# Patient Record
Sex: MALE | Race: WHITE | NOT HISPANIC OR LATINO | Employment: FULL TIME | URBAN - METROPOLITAN AREA
[De-identification: names, ages, dates, MRNs, and addresses within clinical notes are randomized per-mention and may not be internally consistent; named-entity substitution may affect disease eponyms.]

---

## 2017-01-20 ENCOUNTER — OFFICE VISIT (OUTPATIENT)
Dept: OPHTHALMOLOGY | Facility: CLINIC | Age: 47
End: 2017-01-20
Attending: OPHTHALMOLOGY
Payer: COMMERCIAL

## 2017-01-20 DIAGNOSIS — M31.30 WEGENER'S GRANULOMATOSIS (GRANULOMATOSIS WITH POLYANGIITIS): Primary | ICD-10-CM

## 2017-01-20 PROCEDURE — 99212 OFFICE O/P EST SF 10 MIN: CPT | Mod: ZF

## 2017-01-20 ASSESSMENT — TONOMETRY
OD_IOP_MMHG: 9
IOP_METHOD: ICARE
OS_IOP_MMHG: 13

## 2017-01-20 ASSESSMENT — VISUAL ACUITY
METHOD: SNELLEN - LINEAR
METHOD_MR: DECLINES
OS_CC: 20/15
OD_SC: 20/60
OS_CC+: +1
OD_PH_SC: 20/40+1

## 2017-01-20 ASSESSMENT — EXTERNAL EXAM - LEFT EYE: OS_EXAM: NORMAL

## 2017-01-20 ASSESSMENT — SLIT LAMP EXAM - LIDS
COMMENTS: NORMAL
COMMENTS: NORMAL

## 2017-01-20 ASSESSMENT — EXTERNAL EXAM - RIGHT EYE: OD_EXAM: NORMAL

## 2017-01-20 NOTE — PROGRESS NOTES
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 Pt feels that vision is stable, denies any visual changes or complaints today. No problems using all 3 drops and last drop used last night. Still see some floaters, comes and goes often. Denies any pain or discomfort    POHx:  Granulomatosis polyangiitis/Marylin's  Corneal/Scleral Melts requiring patch grafts     Current Eye Medications:   Tacrolimus/Protopic 2-3 times daily OD  Prednisolone BID  PFATs PRN OD    Assessment & Plan:    Braxton Ayala is a 46 year old male with the following diagnoses:     1. H/o penetrating keratoplasty (PK), Right Eye: from Granulomatosis w/ Polyangiitis                - most recent flare up was October 2016 (prior to that was January 2014)              - episcleral/scleral inflammation resolved, stromal haze continues, area of epithelialized 70% corneal thinning present prior (small round inferonasal) stable (photos taken 11/16)              - tacrolimus/Protopic BID              - prednisolone acetate BID              - continue PFAT as needed both eyes for comfort    2. Granulomatosis w/ polyangiitis: follows Rheumatology at the [place]   - off systemic immunosuppression     3. Pseudophakia, Right Eye    Continue present management   Return 6 months or PRN change    Carin Concepcion MD  Cornea Fellow      ~~~~~~~~~~~~~~~~~~~~~~~~~~~~~~~~~~~~~~~~~~~~~~~~~~~~~~~~~~~~~~~~    I have personally examined the patient and agree with the assessment and plan as delineated by the resident / fellow.  I have confirmed the contents of the chief complaint, history of present illness, review of systems, and medical / surgical history sections and edited the note as needed.    Yovani Law MD, MA  Director, Cornea & Anterior Segment  [facility] Department of  Ophthalmology & Visual Neuroscience

## 2017-01-20 NOTE — NURSING NOTE
Chief Complaints and History of Present Illnesses   Patient presents with     Follow Up For     PK RE     HPI    Last Eye Exam:  11/10/16   Affected eye(s):  Right   Symptoms:     Floaters      Duration:  2 months   Frequency:  Constant       Do you have eye pain now?:  No      Comments:  Pt returns for 2 month follow up. Pt feels that vision is stable, denies any visual changes or complaints today. No problems using all 3 drops and last drop used last night. Still see some floaters, comes and goes often. Denies any pain or discomfort. TL GONZALEZ, COA 7:45 AM 01/20/2017

## 2017-03-14 NOTE — PROGRESS NOTES
"Rheumatology Visit     Braxton Ayala MRN# [id number]   YOB: 1970 Age: 46 year old     Date of Visit: March 15, 2017  Primary care provider: Lucina, Darshan Grigsby          Assessment and Plan:   # Limited Wegener's Granulomatosis (R corneal \"melt\" 2007, s/p 9 months of Cytoxan infusion completed 2008, s/p 18 mos maintenance Rx with imuran, off imuran since 9-10. 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 He is asymptomatic. Exam is unchanged. ANCA was low in 3-15 (negative since at least 2010), and Cr, UA, LFT and CRP were normal in 9-16.  I think that AAV remains in remission, but I will screen for activity with Cr, UA, CBC, LFT. If systemic disease remains quiet, if local flares can be controlled with short bursts of prednisone and topical Rx, and if no further transplantation is contemplated, he may continue off systemic immunosuppression. Continue with Protopic (tacrolimus) treatment daily and steroid (dexamethasone) as needed, per Dr. Law in Ophthalmology. Rituximab would be a good choice if graft-threatening eye disease recurs.  # Essential hypertension: Although pt reports normal BP readings at other places, he admits inconsistent adherence to medication, and he has repeat rachelle hypertension  today. Recommend reinstitute lisinopril/HCTZ (10 mg/12.5 mg) and use regularly. Additionally, Mr. Ayala should monitor BP at home 2X weekly, submit a record of several weeks' readings to his primary provider's office (he plans on establishing with Darshan), and continued physical activity. I urged him once again to establish a primary care provider for blood pressure monitoring/management.  # Seasonal allergies: most prevalent in fall and spring; not problematic this season.  1. Use Claritin or other OTC allergy medication as needed (mg not reported/known). Right nasal " "discharge reported today does not seem to be connected eye symptoms and thus will manage as separate conditions.  # Shoulder pain: infrequent but evident in prior medical history. Has not increased in frequency, severity, or intensity over last few years.  Plan Ibuprofen (600 mg) up to tid prn  Return to clinic in 6 months  Quique Gould M.D.  Staff Rheumatologist, [facility]  Pager [phone]        Active Problem List:   Patient Active Problem List    Diagnosis Date Noted     Corneal defect 10/15/2013     Priority: Medium     Hypertension 12/09/2011     Priority: Medium     Treated with ACE-I/HCTZ 2011.       ANCA-associated vasculitis (H) 12/05/2011     Priority: Medium     Limited WG presenting with corneal melt in 2008, s/p 9 months of cytoxan infusion finished in july 2008, started on Imuran 150mg/d (8/08) and prednsione was tapered off by the end of sept 2008, s/p corneal transplant graft R eye march 2009, developed Cataracts,s/p R eye catracts surgery 3-10. Imuran tapered off 9-10. \"Rejection\" of graft 9-10 treated with oral prednisone and steroid drops. 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 New graft placed 4-11.              History of Present Illness:   Pt presents for f/u corneal melt due to Wegener's Granulomatosis. Last seen 9-16. Anti-hypertensive Rx and \"watchful waiting\" was continued at that time.  Interval history:  He took increased dose of eye drop for \"minor flare\" of eye inflammation 5 months ago-- no problems since then.  He has been managing quite well with a current treatment regimen of eye topicals: Protopic (tacrolimus) daily, and steroid (dexamethasone) as needed. The steroid is used \"about once weekly\" to manage any irritation/soreness/dryness in the eyes. He met with his Ophthalmologist, Dr. Law, in 1-17; no activity was noted. January 2014 was his last episode of corneal \"melt.\" He thus continues the longest interval between episodes of corneal melt to " date.  He reports resolved modest OWUSU and dizziness with vigorous activity, noted at last vist. No cough, nasal d/c, chest pain, nausea. He suspects deconditioning was the cause.  For his essential hypertension, Mr. Ayala reports recent variable adherence to Lisinopril-HCTZ. Additionally does not check his blood pressure frequently.He does not have a primary care provider but again states plans to establish one through his wife's insurance.           Review of Systems:   Review Of Systems  Constitutional: Weight stable  Skin: negative  Eyes: HPI  Ears/Nose/Throat: negative  Respiratory: HPI  Cardiovascular: negative  Gastrointestinal: negative  Genitourinary: none  Musculoskeletal: R shoulder pain. Pain typically resolves with ibuprofen (600 mg); stable  Neurologic: negative  Psychiatric: negative  Hematologic/Lymphatic/Immunologic: negative  Endocrine: negative          Past Medical History:   Past Medical History   Diagnosis Date     ANCA-associated vasculitis (H) 12/5/2011     Hypertension      Nonsenile cataract      Marylin's disease (congenital syphilitic osteochondritis)      Past Surgical History   Procedure Laterality Date     Adjust suture eye post procedure  6/15/2012     Procedure: ADJUST SUTURE EYE POST PROCEDURE;  RIGHT EYE REMOVAL AND REPLACEMENT OF SUTURE ;  Surgeon: Andriy Harmon MD;  Location:  [facility]     Cataract iol, right eye  03/10/2011     C corneal transplant,penetrating  04/06/2011     Cataract iol, rt/lt       C anesth,corneal transplant              Social History:   Social History     Occupational History     Not on file.     Social History Main Topics     Smoking status: Never Smoker     Smokeless tobacco: Never Used     Alcohol use Yes      Comment: occasional     Drug use: No     Sexual activity: Not on file     Non smoker      Infrequent EtOH ~ 1x/week.  Coaches basketball         Family History:     Family History   Problem Relation Age of Onset     Glaucoma No family  "hx of      Macular Degeneration No family hx of      Retinal detachment No family hx of      Amblyopia No family hx of      Non contributory       Allergies:     Allergies   Allergen Reactions     Nkda [No Known Drug Allergies]             Medications:   Current Outpatient Prescriptions   Medication Sig Dispense Refill     lisinopril-hydrochlorothiazide (PRINZIDE/ZESTORETIC) 10-12.5 MG per tablet Take 1 tablet by mouth daily 90 tablet 3     prednisoLONE acetate (PRED FORTE) 1 % ophthalmic suspension Place 1 drop into the right eye daily as needed 15 mL 11     PROTOPIC 0.1 % ointment Apply topically daily in the right eye 100 g 11     [DISCONTINUED] lisinopril-hydrochlorothiazide (PRINZIDE,ZESTORETIC) 10-12.5 MG per tablet Take 1 tablet by mouth daily 90 tablet 3              Physical Exam:   Blood pressure (!) 165/108, pulse 63, temperature 98.3  F (36.8  C), temperature source Oral, height 1.816 m (5' 11.5\"), weight 82.1 kg (181 lb), SpO2 98 %.  Wt Readings from Last 4 Encounters:   03/15/17 82.1 kg (181 lb)   09/14/16 83.9 kg (185 lb)   03/16/16 80.5 kg (177 lb 6.4 oz)   09/18/15 81.6 kg (180 lb)       HEENT: No erythema of R lower eyelid or nasal sclera. He is anicteric, no facial rash, sufficient saliva pool, no nasopharyngeal ulcers. No mucosal ulcers or evidence of bleed. R pupil small, irregular, less responsive to light. No scleral injection.  Cor: RRR S1 S2  Lungs: CTA  EXTREMITIES: No edema, full pulses, no cyanosis, no nail fold capillary changes   MSK: No synovitis, from all joints   SKIN: No rashes            Data:     Results for orders placed or performed in visit on 09/14/16   CBC with platelets   Result Value Ref Range    WBC 7.6 4.0 - 11.0 10e9/L    RBC Count 4.79 4.4 - 5.9 10e12/L    Hemoglobin 15.0 13.3 - 17.7 g/dL    Hematocrit 42.3 40.0 - 53.0 %    MCV 88 78 - 100 fl    MCH 31.3 26.5 - 33.0 pg    MCHC 35.5 31.5 - 36.5 g/dL    RDW 12.0 10.0 - 15.0 %    Platelet Count 334 150 - 450 10e9/L "   Creatinine   Result Value Ref Range    Creatinine 1.16 0.66 - 1.25 mg/dL    GFR Estimate 68 >60 mL/min/1.7m2    GFR Estimate If Black 83 >60 mL/min/1.7m2   CRP inflammation   Result Value Ref Range    CRP Inflammation <2.9 0.0 - 8.0 mg/L   Routine UA with microscopic   Result Value Ref Range    Color Urine Yellow     Appearance Urine Clear     Glucose Urine Negative NEG mg/dL    Bilirubin Urine Negative NEG    Ketones Urine Negative NEG mg/dL    Specific Gravity Urine 1.018 1.003 - 1.035    Blood Urine Negative NEG    pH Urine 6.0 5.0 - 7.0 pH    Protein Albumin Urine Negative NEG mg/dL    Urobilinogen mg/dL 0.0 0.0 - 2.0 mg/dL    Nitrite Urine Negative NEG    Leukocyte Esterase Urine Negative NEG    Source Midstream Urine     WBC Urine 1 0 - 2 /HPF    RBC Urine 1 0 - 2 /HPF    Squamous Epithelial /HPF Urine <1 0 - 1 /HPF    Mucous Urine Present (A) NEG /LPF   ALT   Result Value Ref Range    ALT 25 0 - 70 U/L   AST   Result Value Ref Range    AST 15 0 - 45 U/L       Recent Labs   Lab Test  09/14/16   0806  03/05/14   0816  09/04/13   0757  01/08/13   0718   04/21/11   0921   06/10/09   0916  05/07/09   0903  04/23/09   0725   WBC  7.6  9.3  9.2  10.6   < >  8.0   < >  7.3  6.8  7.9   HGB  15.0  16.9  16.0  15.7   < >  14.7   < >  14.6  14.5  15.9   HCT  42.3  47.5  45.4  44.9   < >  40.9   < >  42.7  41.9  44.5   MCV  88  91  91  91   < >  89   < >  94  92  91   PLT  334  395  371  330   < >  287   < >  345  322  338   BUN   --    --    --    --    --   12   --   14  10  19   AST  15  31   --   27   < >  19   < >   --   29  25   ALT  25  24   --   25   < >  17   < >   --   8  8   ALKPHOS   --    --    --    --    --   60   --    --   75  68    < > = values in this interval not displayed.

## 2017-03-15 ENCOUNTER — OFFICE VISIT (OUTPATIENT)
Dept: RHEUMATOLOGY | Facility: CLINIC | Age: 47
End: 2017-03-15
Attending: INTERNAL MEDICINE
Payer: COMMERCIAL

## 2017-03-15 VITALS
OXYGEN SATURATION: 98 % | DIASTOLIC BLOOD PRESSURE: 108 MMHG | BODY MASS INDEX: 24.52 KG/M2 | HEART RATE: 63 BPM | SYSTOLIC BLOOD PRESSURE: 165 MMHG | WEIGHT: 181 LBS | HEIGHT: 72 IN | TEMPERATURE: 98.3 F

## 2017-03-15 DIAGNOSIS — I10 ESSENTIAL HYPERTENSION: ICD-10-CM

## 2017-03-15 LAB
ALBUMIN UR-MCNC: NEGATIVE MG/DL
APPEARANCE UR: CLEAR
BILIRUB UR QL STRIP: NEGATIVE
COLOR UR AUTO: YELLOW
CREAT SERPL-MCNC: 1.15 MG/DL (ref 0.66–1.25)
ERYTHROCYTE [DISTWIDTH] IN BLOOD BY AUTOMATED COUNT: 12.3 % (ref 10–15)
GFR SERPL CREATININE-BSD FRML MDRD: 69 ML/MIN/1.7M2
GLUCOSE UR STRIP-MCNC: NEGATIVE MG/DL
HCT VFR BLD AUTO: 44.1 % (ref 40–53)
HGB BLD-MCNC: 15.3 G/DL (ref 13.3–17.7)
HGB UR QL STRIP: NEGATIVE
KETONES UR STRIP-MCNC: NEGATIVE MG/DL
LEUKOCYTE ESTERASE UR QL STRIP: NEGATIVE
MCH RBC QN AUTO: 32 PG (ref 26.5–33)
MCHC RBC AUTO-ENTMCNC: 34.7 G/DL (ref 31.5–36.5)
MCV RBC AUTO: 92 FL (ref 78–100)
MUCOUS THREADS #/AREA URNS LPF: PRESENT /LPF
NITRATE UR QL: NEGATIVE
PH UR STRIP: 7 PH (ref 5–7)
PLATELET # BLD AUTO: 371 10E9/L (ref 150–450)
RBC # BLD AUTO: 4.78 10E12/L (ref 4.4–5.9)
RBC #/AREA URNS AUTO: 1 /HPF (ref 0–2)
SP GR UR STRIP: 1.02 (ref 1–1.03)
URN SPEC COLLECT METH UR: ABNORMAL
UROBILINOGEN UR STRIP-MCNC: 0 MG/DL (ref 0–2)
WBC # BLD AUTO: 9 10E9/L (ref 4–11)
WBC #/AREA URNS AUTO: <1 /HPF (ref 0–2)

## 2017-03-15 PROCEDURE — 81001 URINALYSIS AUTO W/SCOPE: CPT | Performed by: INTERNAL MEDICINE

## 2017-03-15 PROCEDURE — 82565 ASSAY OF CREATININE: CPT | Performed by: INTERNAL MEDICINE

## 2017-03-15 PROCEDURE — 99212 OFFICE O/P EST SF 10 MIN: CPT | Mod: ZF

## 2017-03-15 PROCEDURE — 85027 COMPLETE CBC AUTOMATED: CPT | Performed by: INTERNAL MEDICINE

## 2017-03-15 PROCEDURE — 36415 COLL VENOUS BLD VENIPUNCTURE: CPT | Performed by: INTERNAL MEDICINE

## 2017-03-15 RX ORDER — LISINOPRIL/HYDROCHLOROTHIAZIDE 10-12.5 MG
1 TABLET ORAL DAILY
Qty: 90 TABLET | Refills: 3 | Status: SHIPPED | OUTPATIENT
Start: 2017-03-15 | End: 2017-09-15

## 2017-03-15 ASSESSMENT — PAIN SCALES - GENERAL: PAINLEVEL: NO PAIN (0)

## 2017-03-15 NOTE — NURSING NOTE
Chief Complaint   Patient presents with     RECHECK     Wegener's granulomatosis    Pt roomed, vitals, meds, and allergies reviewed with pt. Pt ready for provider.  Billy Elias, CMA

## 2017-03-15 NOTE — MR AVS SNAPSHOT
After Visit Summary   3/15/2017    Braxton Ayala    MRN: [id number]           Patient Information     Date Of Birth          1970        Visit Information        Provider Department      3/15/2017 7:30 AM Quique Gould MD [facility] Rheumatology        Today's Diagnoses     Essential hypertension          Care Instructions    Set up primary provider ASAP--address longstanding hypertension issue for best results.        Follow-ups after your visit        Follow-up notes from your care team     Return in about 6 months (around 9/15/2017).      Your next 10 appointments already scheduled     Mar 15, 2017  8:15 AM CDT   Lab with  LAB   [facility] ([facility] [place])    [address]  1st [address]  [place] [facility] [address] [phone]            Jul 20, 2017  7:30 AM CDT   RETURN CORNEA with Yovani Law MD   Eye Clinic ([facility])    [address]  [address]  [address]  [place] [facility] [place] [address]   [phone]            Sep 15, 2017  7:00 AM CDT   (Arrive by 6:45 AM)   Return Visit with Quique Gould MD   [facility] Rheumatology ([facility] [place])    [address]  3rd Floor  [place] [facility] [id number] [phone]              Future tests that were ordered for you today     Open Standing Orders        Priority Remaining Interval Expires Ordered    CBC with platelets Routine 7/7 Every 2 Months 3/15/2018 3/15/2017          Open Future Orders        Priority Expected Expires Ordered    Routine UA with microscopic Routine  4/14/2017 3/15/2017    Creatinine Routine  4/14/2017 3/15/2017            Who to contact     If you have questions or need follow up information about today's clinic visit or your schedule please contact [facility] RHEUMATOLOGY directly at [phone].  Normal or non-critical lab and imaging results will be communicated to you by MyChart, letter or phone within 4 business days after the clinic  "has received the results. If you do not hear from us within 7 days, please contact the clinic through [employer] or phone. If you have a critical or abnormal lab result, we will notify you by phone as soon as possible.  Submit refill requests through [employer] or call your pharmacy and they will forward the refill request to us. Please allow 3 business days for your refill to be completed.          Additional Information About Your Visit        GetSocial Information     [employer] gives you secure access to your electronic health record. If you see a primary care provider, you can also send messages to your care team and make appointments. If you have questions, please call your primary care clinic.  If you do not have a primary care provider, please call [phone] and they will assist you.        Care EveryWhere ID     This is your Care EveryWhere ID. This could be used by other organizations to access your [facility] medical records  [id number]        Your Vitals Were     Pulse Temperature Height Pulse Oximetry BMI (Body Mass Index)       63 98.3  F (36.8  C) (Oral) 1.816 m (5' 11.5\") 98% 24.89 kg/m2        Blood Pressure from Last 3 Encounters:   03/15/17 (!) 165/108   09/14/16 (!) 161/102   03/16/16 132/87    Weight from Last 3 Encounters:   03/15/17 82.1 kg (181 lb)   09/14/16 83.9 kg (185 lb)   03/16/16 80.5 kg (177 lb 6.4 oz)                 Where to get your medicines      These medications were sent to [facility] [address] [place] [address] [address] [place] [id number] [facility] [address]     Phone:  [phone]     lisinopril-hydrochlorothiazide 10-12.5 MG per tablet          Primary Care Provider Office Phone # Fax #    Darshan [facility] [phone] [address] [address]        Thank you!     Thank you for choosing [facility]  for your care. Our goal is always to provide you with excellent care. Hearing back from " our patients is one way we can continue to improve our services. Please take a few minutes to complete the written survey that you may receive in the mail after your visit with us. Thank you!             Your Updated Medication List - Protect others around you: Learn how to safely use, store and throw away your medicines at www.disposemymeds.org.          This list is accurate as of: 3/15/17  7:57 AM.  Always use your most recent med list.                   Brand Name Dispense Instructions for use    lisinopril-hydrochlorothiazide 10-12.5 MG per tablet    PRINZIDE/ZESTORETIC    90 tablet    Take 1 tablet by mouth daily       prednisoLONE acetate 1 % ophthalmic susp    PRED FORTE    15 mL    Place 1 drop into the right eye daily as needed       PROTOPIC 0.1 % ointment   Generic drug:  tacrolimus     100 g    Apply topically daily in the right eye

## 2017-03-15 NOTE — LETTER
"3/15/2017      RE: Braxton Ayala  [address] [facility] [address]       Rheumatology Visit     Braxton Ayala MRN# [id number]   YOB: 1970 Age: 46 year old     Date of Visit: March 15, 2017  Primary care provider: Lucina, Darshan Grigsby          Assessment and Plan:   # Limited Wegener's Granulomatosis (R corneal \"melt\" 2007, s/p 9 months of Cytoxan infusion completed 2008, s/p 18 mos maintenance Rx with imuran, off imuran since 9-10. 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 He is asymptomatic. Exam is unchanged. ANCA was low in 3-15 (negative since at least 2010), and Cr, UA, LFT and CRP were normal in 9-16.  I think that AAV remains in remission, but I will screen for activity with Cr, UA, CBC, LFT. If systemic disease remains quiet, if local flares can be controlled with short bursts of prednisone and topical Rx, and if no further transplantation is contemplated, he may continue off systemic immunosuppression. Continue with Protopic (tacrolimus) treatment daily and steroid (dexamethasone) as needed, per Dr. Law in Ophthalmology. Rituximab would be a good choice if graft-threatening eye disease recurs.  # Essential hypertension: Although pt reports normal BP readings at other places, he admits inconsistent adherence to medication, and he has repeat rachelle hypertension  today. Recommend reinstitute lisinopril/HCTZ (10 mg/12.5 mg) and use regularly. Additionally, Mr. Ayala should monitor BP at home 2X weekly, submit a record of several weeks' readings to his primary provider's office (he plans on establishing with Darshan), and continued physical activity. I urged him once again to establish a primary care provider for blood pressure monitoring/management.  # Seasonal allergies: most prevalent in fall and spring; not problematic this season.  1. Use Claritin or other " "OTC allergy medication as needed (mg not reported/known). Right nasal discharge reported today does not seem to be connected eye symptoms and thus will manage as separate conditions.  # Shoulder pain: infrequent but evident in prior medical history. Has not increased in frequency, severity, or intensity over last few years.  Plan Ibuprofen (600 mg) up to tid prn  Return to clinic in 6 months  Quique Gould M.D.  Staff Rheumatologist, [facility]  Pager [phone]        Active Problem List:   Patient Active Problem List    Diagnosis Date Noted     Corneal defect 10/15/2013     Priority: Medium     Hypertension 12/09/2011     Priority: Medium     Treated with ACE-I/HCTZ 2011.       ANCA-associated vasculitis (H) 12/05/2011     Priority: Medium     Limited WG presenting with corneal melt in 2008, s/p 9 months of cytoxan infusion finished in july 2008, started on Imuran 150mg/d (8/08) and prednsione was tapered off by the end of sept 2008, s/p corneal transplant graft R eye march 2009, developed Cataracts,s/p R eye catracts surgery 3-10. Imuran tapered off 9-10. \"Rejection\" of graft 9-10 treated with oral prednisone and steroid drops. 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 New graft placed 4-11.              History of Present Illness:   Pt presents for f/u corneal melt due to Wegener's Granulomatosis. Last seen 9-16. Anti-hypertensive Rx and \"watchful waiting\" was continued at that time.  Interval history:  He took increased dose of eye drop for \"minor flare\" of eye inflammation 5 months ago-- no problems since then.  He has been managing quite well with a current treatment regimen of eye topicals: Protopic (tacrolimus) daily, and steroid (dexamethasone) as needed. The steroid is used \"about once weekly\" to manage any irritation/soreness/dryness in the eyes. He met with his Ophthalmologist, Dr. Law, in 1-17; no activity was noted. January 2014 was his last episode of corneal \"melt.\" He thus " continues the longest interval between episodes of corneal melt to date.  He reports resolved modest OWUSU and dizziness with vigorous activity, noted at last vist. No cough, nasal d/c, chest pain, nausea. He suspects deconditioning was the cause.  For his essential hypertension, Mr. Ayala reports recent variable adherence to Lisinopril-HCTZ. Additionally does not check his blood pressure frequently.He does not have a primary care provider but again states plans to establish one through his wife's insurance.           Review of Systems:   Review Of Systems  Constitutional: Weight stable  Skin: negative  Eyes: HPI  Ears/Nose/Throat: negative  Respiratory: HPI  Cardiovascular: negative  Gastrointestinal: negative  Genitourinary: none  Musculoskeletal: R shoulder pain. Pain typically resolves with ibuprofen (600 mg); stable  Neurologic: negative  Psychiatric: negative  Hematologic/Lymphatic/Immunologic: negative  Endocrine: negative          Past Medical History:   Past Medical History   Diagnosis Date     ANCA-associated vasculitis (H) 12/5/2011     Hypertension      Nonsenile cataract      Marylin's disease (congenital syphilitic osteochondritis)      Past Surgical History   Procedure Laterality Date     Adjust suture eye post procedure  6/15/2012     Procedure: ADJUST SUTURE EYE POST PROCEDURE;  RIGHT EYE REMOVAL AND REPLACEMENT OF SUTURE ;  Surgeon: Andriy Harmon MD;  Location:  [facility]     Cataract iol, right eye  03/10/2011     C corneal transplant,penetrating  04/06/2011     Cataract iol, rt/lt       C anesth,corneal transplant              Social History:   Social History     Occupational History     Not on file.     Social History Main Topics     Smoking status: Never Smoker     Smokeless tobacco: Never Used     Alcohol use Yes      Comment: occasional     Drug use: No     Sexual activity: Not on file     Non smoker      Infrequent EtOH ~ 1x/week.  Coaches basketball         Family History:  "    Family History   Problem Relation Age of Onset     Glaucoma No family hx of      Macular Degeneration No family hx of      Retinal detachment No family hx of      Amblyopia No family hx of      Non contributory       Allergies:     Allergies   Allergen Reactions     Nkda [No Known Drug Allergies]             Medications:   Current Outpatient Prescriptions   Medication Sig Dispense Refill     lisinopril-hydrochlorothiazide (PRINZIDE/ZESTORETIC) 10-12.5 MG per tablet Take 1 tablet by mouth daily 90 tablet 3     prednisoLONE acetate (PRED FORTE) 1 % ophthalmic suspension Place 1 drop into the right eye daily as needed 15 mL 11     PROTOPIC 0.1 % ointment Apply topically daily in the right eye 100 g 11     [DISCONTINUED] lisinopril-hydrochlorothiazide (PRINZIDE,ZESTORETIC) 10-12.5 MG per tablet Take 1 tablet by mouth daily 90 tablet 3              Physical Exam:   Blood pressure (!) 165/108, pulse 63, temperature 98.3  F (36.8  C), temperature source Oral, height 1.816 m (5' 11.5\"), weight 82.1 kg (181 lb), SpO2 98 %.  Wt Readings from Last 4 Encounters:   03/15/17 82.1 kg (181 lb)   09/14/16 83.9 kg (185 lb)   03/16/16 80.5 kg (177 lb 6.4 oz)   09/18/15 81.6 kg (180 lb)       HEENT: No erythema of R lower eyelid or nasal sclera. He is anicteric, no facial rash, sufficient saliva pool, no nasopharyngeal ulcers. No mucosal ulcers or evidence of bleed. R pupil small, irregular, less responsive to light. No scleral injection.  Cor: RRR S1 S2  Lungs: CTA  EXTREMITIES: No edema, full pulses, no cyanosis, no nail fold capillary changes   MSK: No synovitis, from all joints   SKIN: No rashes            Data:     Results for orders placed or performed in visit on 09/14/16   CBC with platelets   Result Value Ref Range    WBC 7.6 4.0 - 11.0 10e9/L    RBC Count 4.79 4.4 - 5.9 10e12/L    Hemoglobin 15.0 13.3 - 17.7 g/dL    Hematocrit 42.3 40.0 - 53.0 %    MCV 88 78 - 100 fl    MCH 31.3 26.5 - 33.0 pg    MCHC 35.5 31.5 - 36.5 " g/dL    RDW 12.0 10.0 - 15.0 %    Platelet Count 334 150 - 450 10e9/L   Creatinine   Result Value Ref Range    Creatinine 1.16 0.66 - 1.25 mg/dL    GFR Estimate 68 >60 mL/min/1.7m2    GFR Estimate If Black 83 >60 mL/min/1.7m2   CRP inflammation   Result Value Ref Range    CRP Inflammation <2.9 0.0 - 8.0 mg/L   Routine UA with microscopic   Result Value Ref Range    Color Urine Yellow     Appearance Urine Clear     Glucose Urine Negative NEG mg/dL    Bilirubin Urine Negative NEG    Ketones Urine Negative NEG mg/dL    Specific Gravity Urine 1.018 1.003 - 1.035    Blood Urine Negative NEG    pH Urine 6.0 5.0 - 7.0 pH    Protein Albumin Urine Negative NEG mg/dL    Urobilinogen mg/dL 0.0 0.0 - 2.0 mg/dL    Nitrite Urine Negative NEG    Leukocyte Esterase Urine Negative NEG    Source Midstream Urine     WBC Urine 1 0 - 2 /HPF    RBC Urine 1 0 - 2 /HPF    Squamous Epithelial /HPF Urine <1 0 - 1 /HPF    Mucous Urine Present (A) NEG /LPF   ALT   Result Value Ref Range    ALT 25 0 - 70 U/L   AST   Result Value Ref Range    AST 15 0 - 45 U/L       Recent Labs   Lab Test  09/14/16   0806  03/05/14   0816  09/04/13   0757  01/08/13   0718   04/21/11   0921   06/10/09   0916  05/07/09   0903  04/23/09   0725   WBC  7.6  9.3  9.2  10.6   < >  8.0   < >  7.3  6.8  7.9   HGB  15.0  16.9  16.0  15.7   < >  14.7   < >  14.6  14.5  15.9   HCT  42.3  47.5  45.4  44.9   < >  40.9   < >  42.7  41.9  44.5   MCV  88  91  91  91   < >  89   < >  94  92  91   PLT  334  395  371  330   < >  287   < >  345  322  338   BUN   --    --    --    --    --   12   --   14  10  19   AST  15  31   --   27   < >  19   < >   --   29  25   ALT  25  24   --   25   < >  17   < >   --   8  8   ALKPHOS   --    --    --    --    --   60   --    --   75  68    < > = values in this interval not displayed.         Quique Gould MD

## 2017-07-20 ENCOUNTER — OFFICE VISIT (OUTPATIENT)
Dept: OPHTHALMOLOGY | Facility: CLINIC | Age: 47
End: 2017-07-20
Attending: OPHTHALMOLOGY
Payer: COMMERCIAL

## 2017-07-20 DIAGNOSIS — Z94.7 HISTORY OF PENETRATING KERATOPLASTY: ICD-10-CM

## 2017-07-20 DIAGNOSIS — I77.82 ANCA-ASSOCIATED VASCULITIS (H): ICD-10-CM

## 2017-07-20 DIAGNOSIS — M31.30 WEGENER'S GRANULOMATOSIS (GRANULOMATOSIS WITH POLYANGIITIS): Primary | ICD-10-CM

## 2017-07-20 DIAGNOSIS — Z96.1 PSEUDOPHAKIA OF RIGHT EYE: ICD-10-CM

## 2017-07-20 PROCEDURE — 99213 OFFICE O/P EST LOW 20 MIN: CPT | Mod: ZF

## 2017-07-20 RX ORDER — OFLOXACIN 3 MG/ML
1 SOLUTION/ DROPS OPHTHALMIC 4 TIMES DAILY
Qty: 1 BOTTLE | Refills: 11 | Status: SHIPPED | OUTPATIENT
Start: 2017-07-20 | End: 2017-09-15

## 2017-07-20 ASSESSMENT — REFRACTION_WEARINGRX
OD_SPHERE: BALANCE
OS_SPHERE: -3.00
OS_AXIS: 087
SPECS_TYPE: SVL
OS_CYLINDER: +1.00

## 2017-07-20 ASSESSMENT — VISUAL ACUITY
OS_CC: 20/15
CORRECTION_TYPE: GLASSES
METHOD: SNELLEN - LINEAR
OD_SC: 20/60-1
OD_PH_SC: 20/50+1

## 2017-07-20 ASSESSMENT — CONF VISUAL FIELD
OD_NORMAL: 1
OS_NORMAL: 1

## 2017-07-20 ASSESSMENT — TONOMETRY
IOP_METHOD: ICARE
OS_IOP_MMHG: 17
OD_IOP_MMHG: 11

## 2017-07-20 ASSESSMENT — EXTERNAL EXAM - RIGHT EYE: OD_EXAM: NORMAL

## 2017-07-20 ASSESSMENT — EXTERNAL EXAM - LEFT EYE: OS_EXAM: NORMAL

## 2017-07-20 ASSESSMENT — SLIT LAMP EXAM - LIDS
COMMENTS: NORMAL
COMMENTS: NORMAL

## 2017-07-20 NOTE — PROGRESS NOTES
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 Pt feels that vision is stable/better. No photopsias, floaters are stable. No eye pain. Using drops less consistently. Uses Pred forte only intermittently when eye is irritated/red. Felt that there was a hair in his eye.    POHx:  Granulomatosis polyangiitis/Marylin's  Corneal/Scleral Melts requiring patch grafts     Current Eye Medications:   Tacrolimus/Protopic 1-2 times daily PRN OD  Prednisolone 1-2 times per week  PFATs PRN OD    Assessment & Plan:    Braxton Ayala is a 47 year old male with the following diagnoses:     1. H/o penetrating keratoplasty (PK), Right Eye: from Granulomatosis w/ Polyangiitis                - most recent flare up was October 2016 (prior to that was January 2014)              - episcleral/scleral inflammation resolved, stromal haze continues, area of epithelialized 70% corneal thinning present prior (small round inferonasal) stable (photos taken 11/16)              - emphasized importance of scheduled tacrolimus/Protopic BID              - can use prednisolone acetate BID PRN              - continue PFAT as needed both eyes for comfort   -loose suture removed 07/20/17 . Start ofloxacin QID x 4 days OD    2. Granulomatosis w/ polyangiitis: follows Rheumatology at the [place]   - off systemic immunosuppression     3. Pseudophakia, Right Eye    Continue present management   Return 6 months annual exam and photos or earlier PRN    Braxton Arriaga MD      ~~~~~~~~~~~~~~~~~~~~~~~~~~~~~~~~~~~~~~~~~~~~~~~~~~~~~~~~~~~~~~~~    Complete documentation of historical and exam elements from today's encounter can be found in the full encounter summary report (not reduplicated in this progress note). I personally obtained the chief complaint(s) and history of present illness.   I confirmed and edited as necessary the review of systems, past medical/surgical history, family history, social history, and examination findings as documented by others.  I examined the patient myself, and I personally reviewed the relevant tests, images, and reports as documented above. I formulated and edited as necessary the assessment and plan and discussed the findings and management plan with the patient and family.     Yovani Law MD, MA  Director, Cornea & Anterior Segment  [facility] Department of Ophthalmology & Visual Neuroscience

## 2017-07-20 NOTE — NURSING NOTE
Chief Complaints and History of Present Illnesses   Patient presents with     Follow Up For     H/o penetrating keratoplasty, Right Eye     HPI    Affected eye(s):  Right   Symptoms:     Blurred vision   No decreased vision   No Dryness   Itching (Comment: More so LE)         Do you have eye pain now?:  No      Comments:  Seems to be stable RE. Has been using drops prn, no AT. No dryness/irritation  Shari GONG July 20, 2017 7:41 AM

## 2017-07-20 NOTE — MR AVS SNAPSHOT
After Visit Summary   7/20/2017    Braxton Ayala    MRN: [id number]           Patient Information     Date Of Birth          1970        Visit Information        Provider Department      7/20/2017 7:30 AM Yovani Law MD [facility]        Today's Diagnoses     Wegener's granulomatosis (granulomatosis with polyangiitis) (H)    -  1    History of penetrating keratoplasty        Pseudophakia of right eye        ANCA-associated vasculitis (H)           Follow-ups after your visit        Follow-up notes from your care team     Return in about 6 months (around 1/20/2018) for Annual Visit.      Your next 10 appointments already scheduled     Sep 15, 2017  7:00 AM CDT   (Arrive by 6:45 AM)   Return Visit with Quique Gould MD   [facility] Rheumatology ([facility])    [address] [facility]  [address] [facility] [id number] [phone]            Jan 23, 2018  7:30 AM CST   RETURN CORNEA with Yovani Law MD   [facility] Clinic ([facility])    [address]  [facility] [address] [phone]              Who to contact     Please call your clinic at [phone] to:    Ask questions about your health    Make or cancel appointments    Discuss your medicines    Learn about your test results    Speak to your doctor   If you have compliments or concerns about an experience at your clinic, or if you wish to file a complaint, please contact [facility] Patient Relations at [phone] or email us at [email or web address]         Additional Information About Your Visit        MyChart Information     [employer] gives you secure access to your electronic health record. If you see a primary care provider, you can also send messages to your care team and make appointments. If you have questions, please call your primary care clinic.  If you do not have a primary care provider, please call  [phone] and they will assist you.      [employer] is an electronic gateway that provides easy, online access to your medical records. With [employer], you can request a clinic appointment, read your test results, renew a prescription or communicate with your care team.     To access your existing account, please contact your [facility] or call [phone] for assistance.        Care EveryWhere ID     This is your Care EveryWhere ID. This could be used by other organizations to access your [facility] medical records  [id number]         Blood Pressure from Last 3 Encounters:   03/15/17 (!) 165/108   09/14/16 (!) 161/102   03/16/16 132/87    Weight from Last 3 Encounters:   03/15/17 82.1 kg (181 lb)   09/14/16 83.9 kg (185 lb)   03/16/16 80.5 kg (177 lb 6.4 oz)              Today, you had the following     No orders found for display         Today's Medication Changes          These changes are accurate as of: 7/20/17  8:49 AM.  If you have any questions, ask your nurse or doctor.               Start taking these medicines.        Dose/Directions    ofloxacin 0.3 % ophthalmic solution   Commonly known as:  OCUFLOX   Used for:  History of penetrating keratoplasty        Dose:  1 drop   Place 1 drop into the right eye 4 times daily   Quantity:  1 Bottle   Refills:  11            Where to get your medicines      These medications were sent to [facility] [address] [place] [address] [address] [address] [place] [address] [facility] [address]     Phone:  [phone]     ofloxacin 0.3 % ophthalmic solution                Primary Care Provider Office Phone # Fax #    [facility] [phone] [address] [facility] [address]        Equal Access to Services     CHYNA STRAUSS AH: Darren Larsen, ayla valdivia, qakar kawaylon auguste, maryjane man. So [facility] [phone].    ATENCIÓN: Si  jackie ahuja, tiene a leyva disposición servicios gratuitos de asistencia lingüística. Augusto cuadra [phone].    We comply with applicable federal civil rights laws and [place] laws. We do not discriminate on the basis of race, color, national origin, age, disability sex, sexual orientation or gender identity.            Thank you!     Thank you for choosing [facility]  for your care. Our goal is always to provide you with excellent care. Hearing back from our patients is one way we can continue to improve our services. Please take a few minutes to complete the written survey that you may receive in the mail after your visit with us. Thank you!             Your Updated Medication List - Protect others around you: Learn how to safely use, store and throw away your medicines at www.disposemymeds.org.          This list is accurate as of: 7/20/17  8:49 AM.  Always use your most recent med list.                   Brand Name Dispense Instructions for use Diagnosis    lisinopril-hydrochlorothiazide 10-12.5 MG per tablet    PRINZIDE/ZESTORETIC    90 tablet    Take 1 tablet by mouth daily    Essential hypertension       ofloxacin 0.3 % ophthalmic solution    OCUFLOX    1 Bottle    Place 1 drop into the right eye 4 times daily    History of penetrating keratoplasty       prednisoLONE acetate 1 % ophthalmic susp    PRED FORTE    15 mL    Place 1 drop into the right eye daily as needed    History of penetrating keratoplasty       PROTOPIC 0.1 % ointment   Generic drug:  tacrolimus     100 g    Apply topically daily in the right eye    Wegener's granulomatosis (granulomatosis with polyangiitis) (H)

## 2017-09-14 NOTE — PROGRESS NOTES
"Rheumatology Visit     Braxton Ayala MRN# [id number]   YOB: 1970 Age: 47 year old     Date of Visit: September 15, 2017  Primary care provider: Lucina, Darshan Grigsby          Assessment and Plan:   # Limited Wegener's Granulomatosis (R corneal \"melt\" 2007, s/p 9 months of Cytoxan infusion completed 2008, s/p 18 mos maintenance Rx with imuran, off imuran since 9-10. 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 He is asymptomatic. Exam is unchanged. ANCA was low in 3-15 (negative since at least 2010), and Cr, UA, LFT and CRP were normal in 3-17 or 8-17 through primary care.  I think that ANCA-associated vasculitis remains in remission, but I will screen for activity with ANCA, Cr, UA, CBC, LFT. If systemic disease remains quiet, if local flares can be controlled with short bursts of prednisone and topical Rx, and if no further transplantation is contemplated, he may continue off systemic immunosuppression. Continue with Protopic (tacrolimus) treatment daily and steroid (dexamethasone) as needed, per Dr. Law in Ophthalmology. Rituximab would be a good choice if graft-threatening eye disease recurs.  # Essential hypertension: I agree with continuing lisinopril/HCTZ and monitoring BP, Cr, And K+ regularly..  # Seasonal allergies: most prevalent in fall and spring; not problematic at present.  # Shoulder pain: infrequent but evident in prior medical history. Has not increased in frequency, severity, or intensity over last few years.  Plan Ibuprofen (600 mg) up to tid prn  Return to clinic in 12 months  Quique Gould M.D.  Staff Rheumatologist, [facility]  Pager [phone]        Active Problem List:   Patient Active Problem List    Diagnosis Date Noted     Corneal defect 10/15/2013     Priority: Medium     Hypertension 12/09/2011     Priority: Medium     Treated " "with ACE-I/HCTZ 2011.       ANCA-associated vasculitis (H) 12/05/2011     Priority: Medium     Limited WG presenting with corneal melt in 2008, s/p 9 months of cytoxan infusion finished in july 2008, started on Imuran 150mg/d (8/08) and prednsione was tapered off by the end of sept 2008, s/p corneal transplant graft R eye march 2009, developed Cataracts,s/p R eye catracts surgery 3-10. Imuran tapered off 9-10. \"Rejection\" of graft 9-10 treated with oral prednisone and steroid drops. 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 New graft placed 4-11.              History of Present Illness:   Pt presents for f/u corneal melt due to Wegener's Granulomatosis. Last seen 3-17. Anti-hypertensive Rx and \"watchful waiting\" was continued at that time.  Interval history:.  He has been managing well with a current treatment regimen of eye topicals: Protopic (tacrolimus) daily, and steroid (dexamethasone) as needed. The steroid is used \"about once weekly\" to manage any irritation/soreness/dryness in the eyes. He met with his Ophthalmologist, Dr. Law, in 1-17; no activity was noted. January 2014 was his last episode of corneal \"melt.\" He thus continues the longest interval between episodes of corneal melt to date.  For his essential hypertension, Mr. Ayala reports recent increased adherence to Lisinopril-HCTZ. He has changed his diet to low refined sugar and carbs. He is monitoring BP at home, and working with primary care to manage cholesterol.  His occasional R shoulder pain is stable.         Review of Systems:   Review Of Systems  Constitutional: Weight stable  Skin: negative  Eyes: HPI  Ears/Nose/Throat: negative  Respiratory: HPI  Cardiovascular: negative  Gastrointestinal: negative  Genitourinary: none  Musculoskeletal: occasional R shoulder pain. Pain typically resolves with ibuprofen (600 mg) one month stable  Neurologic: negative  Psychiatric: negative  Hematologic/Lymphatic/Immunologic: " "negative  Endocrine: negative          Past Medical History:   Past Medical History:   Diagnosis Date     ANCA-associated vasculitis (H) 12/5/2011     Hypertension      Nonsenile cataract      Marylin's disease (congenital syphilitic osteochondritis)      Past Surgical History:   Procedure Laterality Date     ADJUST SUTURE EYE POST PROCEDURE  6/15/2012    Procedure: ADJUST SUTURE EYE POST PROCEDURE;  RIGHT EYE REMOVAL AND REPLACEMENT OF SUTURE ;  Surgeon: Andriy Harmon MD;  [facility]:  [facility]     C ANESTH,CORNEAL TRANSPLANT       C CORNEAL TRANSPLANT,PENETRATING  04/06/2011     CATARACT IOL, right eye  03/10/2011     CATARACT IOL, RT/LT              Social History:   Social History     Occupational History     Not on file.     Social History Main Topics     Smoking status: Never Smoker     Smokeless tobacco: Never Used     Alcohol use Yes      Comment: occasional     Drug use: No     Sexual activity: Not on file     Non smoker      Infrequent EtOH ~ 1x/week.  Coaches basketball         Family History:     Family History   Problem Relation Age of Onset     Glaucoma No family hx of      Macular Degeneration No family hx of      Retinal detachment No family hx of      Amblyopia No family hx of      Non contributory       Allergies:     Allergies   Allergen Reactions     Nkda [No Known Drug Allergies]             Medications:   Current Outpatient Prescriptions   Medication Sig Dispense Refill     lisinopril (PRINIVIL/ZESTRIL) 20 MG tablet Take 20 mg by mouth       prednisoLONE acetate (PRED FORTE) 1 % ophthalmic suspension Place 1 drop into the right eye daily as needed 15 mL 11     PROTOPIC 0.1 % ointment Apply topically daily in the right eye 100 g 11              Physical Exam:   Blood pressure (!) 129/91, pulse 72, temperature 98.4  F (36.9  C), temperature source Oral, height 1.816 m (5' 11.5\"), weight 80.3 kg (177 lb), SpO2 100 %.  Wt Readings from Last 4 Encounters:   09/15/17 80.3 kg (177 lb) "   03/15/17 82.1 kg (181 lb)   09/14/16 83.9 kg (185 lb)   03/16/16 80.5 kg (177 lb 6.4 oz)     HEENT: No erythema of R lower eyelid or nasal sclera. He is anicteric, no facial rash, sufficient saliva pool, no nasopharyngeal ulcers. No mucosal ulcers or evidence of bleed. R pupil small, irregular, less responsive to light. No scleral injection.  Cor: RRR S1 S2  Lungs: CTA  EXTREMITIES: No edema, full pulses, no cyanosis, no nail fold capillary changes   MSK: No synovitis, from all joints   SKIN: No rashes            Data:     Results for orders placed or performed in visit on 03/15/17   CBC with platelets   Result Value Ref Range    WBC 9.0 4.0 - 11.0 10e9/L    RBC Count 4.78 4.4 - 5.9 10e12/L    Hemoglobin 15.3 13.3 - 17.7 g/dL    Hematocrit 44.1 40.0 - 53.0 %    MCV 92 78 - 100 fl    MCH 32.0 26.5 - 33.0 pg    MCHC 34.7 31.5 - 36.5 g/dL    RDW 12.3 10.0 - 15.0 %    Platelet Count 371 150 - 450 10e9/L   Routine UA with microscopic   Result Value Ref Range    Color Urine Yellow     Appearance Urine Clear     Glucose Urine Negative NEG mg/dL    Bilirubin Urine Negative NEG    Ketones Urine Negative NEG mg/dL    Specific Gravity Urine 1.023 1.003 - 1.035    Blood Urine Negative NEG    pH Urine 7.0 5.0 - 7.0 pH    Protein Albumin Urine Negative NEG mg/dL    Urobilinogen mg/dL 0.0 0.0 - 2.0 mg/dL    Nitrite Urine Negative NEG    Leukocyte Esterase Urine Negative NEG    Source Midstream Urine     WBC Urine <1 0 - 2 /HPF    RBC Urine 1 0 - 2 /HPF    Mucous Urine Present (A) NEG /LPF   Creatinine   Result Value Ref Range    Creatinine 1.15 0.66 - 1.25 mg/dL    GFR Estimate 69 >60 mL/min/1.7m2    GFR Estimate If Black 83 >60 mL/min/1.7m2       Recent Labs   Lab Test  09/15/17   0745  03/15/17   0822  09/14/16   0806  03/05/14   0816   01/08/13   0718   04/21/11   0921   WBC  7.3  9.0  7.6  9.3   < >  10.6   < >  8.0   HGB  13.8  15.3  15.0  16.9   < >  15.7   < >  14.7   HCT  40.0  44.1  42.3  47.5   < >  44.9   < >  40.9    MCV  91  92  88  91   < >  91   < >  89   PLT  334  371  334  395   < >  330   < >  287   BUN   --    --    --    --    --    --    --   12   AST   --    --   15  31   --   27   < >  19   ALT   --    --   25  24   --   25   < >  17   ALKPHOS   --    --    --    --    --    --    --   60    < > = values in this interval not displayed.

## 2017-09-15 ENCOUNTER — OFFICE VISIT (OUTPATIENT)
Dept: RHEUMATOLOGY | Facility: CLINIC | Age: 47
End: 2017-09-15
Attending: INTERNAL MEDICINE
Payer: COMMERCIAL

## 2017-09-15 VITALS
DIASTOLIC BLOOD PRESSURE: 91 MMHG | HEIGHT: 72 IN | WEIGHT: 177 LBS | OXYGEN SATURATION: 100 % | SYSTOLIC BLOOD PRESSURE: 129 MMHG | TEMPERATURE: 98.4 F | HEART RATE: 72 BPM | BODY MASS INDEX: 23.98 KG/M2

## 2017-09-15 DIAGNOSIS — I77.82 ANCA-ASSOCIATED VASCULITIS (H): Primary | ICD-10-CM

## 2017-09-15 DIAGNOSIS — I10 ESSENTIAL HYPERTENSION: ICD-10-CM

## 2017-09-15 DIAGNOSIS — I77.82 ANCA-ASSOCIATED VASCULITIS (H): ICD-10-CM

## 2017-09-15 LAB
ALBUMIN UR-MCNC: NEGATIVE MG/DL
APPEARANCE UR: CLEAR
BILIRUB UR QL STRIP: NEGATIVE
COLOR UR AUTO: YELLOW
CREAT SERPL-MCNC: 1.18 MG/DL (ref 0.66–1.25)
CRP SERPL-MCNC: <2.9 MG/L (ref 0–8)
ERYTHROCYTE [DISTWIDTH] IN BLOOD BY AUTOMATED COUNT: 12.3 % (ref 10–15)
GFR SERPL CREATININE-BSD FRML MDRD: 67 ML/MIN/1.7M2
GLUCOSE UR STRIP-MCNC: NEGATIVE MG/DL
HCT VFR BLD AUTO: 40 % (ref 40–53)
HGB BLD-MCNC: 13.8 G/DL (ref 13.3–17.7)
HGB UR QL STRIP: NEGATIVE
KETONES UR STRIP-MCNC: NEGATIVE MG/DL
LEUKOCYTE ESTERASE UR QL STRIP: NEGATIVE
MCH RBC QN AUTO: 31.4 PG (ref 26.5–33)
MCHC RBC AUTO-ENTMCNC: 34.5 G/DL (ref 31.5–36.5)
MCV RBC AUTO: 91 FL (ref 78–100)
MUCOUS THREADS #/AREA URNS LPF: PRESENT /LPF
NITRATE UR QL: NEGATIVE
PH UR STRIP: 5 PH (ref 5–7)
PLATELET # BLD AUTO: 334 10E9/L (ref 150–450)
RBC # BLD AUTO: 4.39 10E12/L (ref 4.4–5.9)
RBC #/AREA URNS AUTO: <1 /HPF (ref 0–2)
SOURCE: ABNORMAL
SP GR UR STRIP: 1.03 (ref 1–1.03)
SQUAMOUS #/AREA URNS AUTO: <1 /HPF (ref 0–1)
UROBILINOGEN UR STRIP-MCNC: 0 MG/DL (ref 0–2)
WBC # BLD AUTO: 7.3 10E9/L (ref 4–11)
WBC #/AREA URNS AUTO: 1 /HPF (ref 0–2)

## 2017-09-15 PROCEDURE — 86140 C-REACTIVE PROTEIN: CPT | Performed by: INTERNAL MEDICINE

## 2017-09-15 PROCEDURE — 99212 OFFICE O/P EST SF 10 MIN: CPT | Mod: ZF

## 2017-09-15 PROCEDURE — 36415 COLL VENOUS BLD VENIPUNCTURE: CPT | Performed by: INTERNAL MEDICINE

## 2017-09-15 PROCEDURE — 81001 URINALYSIS AUTO W/SCOPE: CPT | Performed by: INTERNAL MEDICINE

## 2017-09-15 PROCEDURE — 86255 FLUORESCENT ANTIBODY SCREEN: CPT | Performed by: INTERNAL MEDICINE

## 2017-09-15 PROCEDURE — 85027 COMPLETE CBC AUTOMATED: CPT | Performed by: INTERNAL MEDICINE

## 2017-09-15 PROCEDURE — 82565 ASSAY OF CREATININE: CPT | Performed by: INTERNAL MEDICINE

## 2017-09-15 RX ORDER — LISINOPRIL 20 MG/1
20 TABLET ORAL
COMMUNITY
Start: 2017-08-24

## 2017-09-15 RX ORDER — PREDNISOLONE ACETATE 10 MG/ML
1 SUSPENSION/ DROPS OPHTHALMIC
COMMUNITY
Start: 2017-07-20 | End: 2017-09-15

## 2017-09-15 ASSESSMENT — PAIN SCALES - GENERAL: PAINLEVEL: NO PAIN (0)

## 2017-09-15 NOTE — NURSING NOTE
Chief Complaint   Patient presents with     RECHECK     Marylin   Pt roomed, vitals, meds, and allergies reviewed with pt. Pt ready for provider.  Billy Elias, CMA

## 2017-09-15 NOTE — MR AVS SNAPSHOT
After Visit Summary   9/15/2017    Braxton Ayala    MRN: [id number]           Patient Information     Date Of Birth          1970        Visit Information        Provider Department      9/15/2017 7:00 AM Quique Gould MD [facility] Rheumatology        Today's Diagnoses     ANCA-associated vasculitis (H)    -  1       Follow-ups after your visit        Follow-up notes from your care team     Return in about 1 year (around 9/15/2018).      Your next 10 appointments already scheduled     Jan 23, 2018  7:30 AM CST   RETURN CORNEA with Yovani Law MD   Eye Clinic ([facility])    [address]  [address]  [address] [address]  [facility] [address]   [phone]            Sep 14, 2018  7:00 AM CDT   (Arrive by 6:45 AM)   Return Visit with Quique Gould MD   [facility] Rheumatology ([facility])    [address]  [address] [facility] [id number] [phone]              Who to contact     If you have questions or need follow up information about today's clinic visit or your schedule please contact [facility] RHEUMATOLOGY directly at [phone].  Normal or non-critical lab and imaging results will be communicated to you by [employer], letter or phone within 4 business days after the clinic has received the results. If you do not hear from us within 7 days, please contact the clinic through [employer] or phone. If you have a critical or abnormal lab result, we will notify you by phone as soon as possible.  Submit refill requests through [employer] or call your pharmacy and they will forward the refill request to us. Please allow 3 business days for your refill to be completed.          Additional Information About Your Visit        MyChart Information     [employer] gives you secure access to your electronic health record. If you see a primary care provider, you can also send messages to your care team and make appointments. If you have  "questions, please call your primary care clinic.  If you do not have a primary care provider, please call [phone] and they will assist you.        Care EveryWhere ID     This is your Care EveryWhere ID. This could be used by other organizations to access your [facility] medical records  [id number]        Your Vitals Were     Pulse Temperature Height Pulse Oximetry BMI (Body Mass Index)       72 98.4  F (36.9  C) (Oral) 1.816 m (5' 11.5\") 100% 24.34 kg/m2        Blood Pressure from Last 3 Encounters:   09/15/17 (!) 129/91   03/15/17 (!) 165/108   09/14/16 (!) 161/102    Weight from Last 3 Encounters:   09/15/17 80.3 kg (177 lb)   03/15/17 82.1 kg (181 lb)   09/14/16 83.9 kg (185 lb)                 Today's Medication Changes          These changes are accurate as of: 9/15/17  3:53 PM.  If you have any questions, ask your nurse or doctor.               Stop taking these medicines if you haven't already. Please contact your care team if you have questions.     lisinopril-hydrochlorothiazide 10-12.5 MG per tablet   Commonly known as:  PRINZIDE/ZESTORETIC   Stopped by:  Quique Gould MD                    Primary Care Provider Office Phone # Fax #    Darshan [facility] [phone] [address] [facility] [address]        Equal Access to Services     CARLITOS [facility] AH: Hadii aad ku hadasho Sohaileyali, waaxda luqadaha, qaybta kaalmada maryjane auguste . So [facility] [phone].    ATENCIÓN: Si habla seymour, tiene a leyva disposición servicios gratuitos de asistencia lingüística. Llame al [phone].    We comply with applicable federal civil rights laws and [place] laws. We do not discriminate on the basis of race, color, national origin, age, disability sex, sexual orientation or gender identity.            Thank you!     Thank you for choosing [facility]  for your care. Our goal is always to provide you with excellent care. Hearing back from " our patients is one way we can continue to improve our services. Please take a few minutes to complete the written survey that you may receive in the mail after your visit with us. Thank you!             Your Updated Medication List - Protect others around you: Learn how to safely use, store and throw away your medicines at www.disposemymeds.org.          This list is accurate as of: 9/15/17  3:53 PM.  Always use your most recent med list.                   Brand Name Dispense Instructions for use Diagnosis    lisinopril 20 MG tablet    PRINIVIL/ZESTRIL     Take 20 mg by mouth    ANCA-associated vasculitis (H)       prednisoLONE acetate 1 % ophthalmic susp    PRED FORTE    15 mL    Place 1 drop into the right eye daily as needed    History of penetrating keratoplasty       PROTOPIC 0.1 % ointment   Generic drug:  tacrolimus     100 g    Apply topically daily in the right eye    Wegener's granulomatosis (granulomatosis with polyangiitis) (H)

## 2017-09-15 NOTE — LETTER
"9/15/2017      RE: Braxton Ayala  [address] [address]       Rheumatology Visit     Braxton Ayala MRN# [id number]   YOB: 1970 Age: 47 year old     Date of Visit: September 15, 2017  Primary care provider: Lucina, Darshan Grigsby          Assessment and Plan:   # Limited Wegener's Granulomatosis (R corneal \"melt\" 2007, s/p 9 months of Cytoxan infusion completed 2008, s/p 18 mos maintenance Rx with imuran, off imuran since 9-10. 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 He is asymptomatic. Exam is unchanged. ANCA was low in 3-15 (negative since at least 2010), and Cr, UA, LFT and CRP were normal in 3-17 or 8-17 through primary care.  I think that ANCA-associated vasculitis remains in remission, but I will screen for activity with ANCA, Cr, UA, CBC, LFT. If systemic disease remains quiet, if local flares can be controlled with short bursts of prednisone and topical Rx, and if no further transplantation is contemplated, he may continue off systemic immunosuppression. Continue with Protopic (tacrolimus) treatment daily and steroid (dexamethasone) as needed, per Dr. Law in Ophthalmology. Rituximab would be a good choice if graft-threatening eye disease recurs.  # Essential hypertension: I agree with continuing lisinopril/HCTZ and monitoring BP, Cr, And K+ regularly..  # Seasonal allergies: most prevalent in fall and spring; not problematic at present.  # Shoulder pain: infrequent but evident in prior medical history. Has not increased in frequency, severity, or intensity over last few years.  Plan Ibuprofen (600 mg) up to tid prn  Return to clinic in 12 months  Quique Gould M.D.  Staff Rheumatologist, [facility]  Pager [phone]        Active Problem List:   Patient Active Problem List    Diagnosis Date Noted     Corneal defect 10/15/2013     " "Priority: Medium     Hypertension 12/09/2011     Priority: Medium     Treated with ACE-I/HCTZ 2011.       ANCA-associated vasculitis (H) 12/05/2011     Priority: Medium     Limited WG presenting with corneal melt in 2008, s/p 9 months of cytoxan infusion finished in july 2008, started on Imuran 150mg/d (8/08) and prednsione was tapered off by the end of sept 2008, s/p corneal transplant graft R eye march 2009, developed Cataracts,s/p R eye catracts surgery 3-10. Imuran tapered off 9-10. \"Rejection\" of graft 9-10 treated with oral prednisone and steroid drops. 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 New graft placed 4-11.              History of Present Illness:   Pt presents for f/u corneal melt due to Wegener's Granulomatosis. Last seen 3-17. Anti-hypertensive Rx and \"watchful waiting\" was continued at that time.  Interval history:.  He has been managing well with a current treatment regimen of eye topicals: Protopic (tacrolimus) daily, and steroid (dexamethasone) as needed. The steroid is used \"about once weekly\" to manage any irritation/soreness/dryness in the eyes. He met with his Ophthalmologist, Dr. Law, in 1-17; no activity was noted. January 2014 was his last episode of corneal \"melt.\" He thus continues the longest interval between episodes of corneal melt to date.  For his essential hypertension, Mr. Ayala reports recent increased adherence to Lisinopril-HCTZ. He has changed his diet to low refined sugar and carbs. He is monitoring BP at home, and working with primary care to manage cholesterol.  His occasional R shoulder pain is stable.         Review of Systems:   Review Of Systems  Constitutional: Weight stable  Skin: negative  Eyes: HPI  Ears/Nose/Throat: negative  Respiratory: HPI  Cardiovascular: negative  Gastrointestinal: negative  Genitourinary: none  Musculoskeletal: occasional R shoulder pain. Pain typically resolves with ibuprofen (600 mg) one month stable  Neurologic: " "negative  Psychiatric: negative  Hematologic/Lymphatic/Immunologic: negative  Endocrine: negative          Past Medical History:   Past Medical History:   Diagnosis Date     ANCA-associated vasculitis (H) 12/5/2011     Hypertension      Nonsenile cataract      Marylin's disease (congenital syphilitic osteochondritis)      Past Surgical History:   Procedure Laterality Date     ADJUST SUTURE EYE POST PROCEDURE  6/15/2012    Procedure: ADJUST SUTURE EYE POST PROCEDURE;  RIGHT EYE REMOVAL AND REPLACEMENT OF SUTURE ;  Surgeon: Andriy Harmon MD;  Location:  [facility]     C ANESTH,CORNEAL TRANSPLANT       C CORNEAL TRANSPLANT,PENETRATING  04/06/2011     CATARACT IOL, right eye  03/10/2011     CATARACT IOL, RT/LT              Social History:   Social History     Occupational History     Not on file.     Social History Main Topics     Smoking status: Never Smoker     Smokeless tobacco: Never Used     Alcohol use Yes      Comment: occasional     Drug use: No     Sexual activity: Not on file     Non smoker      Infrequent EtOH ~ 1x/week.  Coaches basketball         Family History:     Family History   Problem Relation Age of Onset     Glaucoma No family hx of      Macular Degeneration No family hx of      Retinal detachment No family hx of      Amblyopia No family hx of      Non contributory       Allergies:     Allergies   Allergen Reactions     Nkda [No Known Drug Allergies]             Medications:   Current Outpatient Prescriptions   Medication Sig Dispense Refill     lisinopril (PRINIVIL/ZESTRIL) 20 MG tablet Take 20 mg by mouth       prednisoLONE acetate (PRED FORTE) 1 % ophthalmic suspension Place 1 drop into the right eye daily as needed 15 mL 11     PROTOPIC 0.1 % ointment Apply topically daily in the right eye 100 g 11              Physical Exam:   Blood pressure (!) 129/91, pulse 72, temperature 98.4  F (36.9  C), temperature source Oral, height 1.816 m (5' 11.5\"), weight 80.3 kg (177 lb), SpO2 100 " %.  Wt Readings from Last 4 Encounters:   09/15/17 80.3 kg (177 lb)   03/15/17 82.1 kg (181 lb)   09/14/16 83.9 kg (185 lb)   03/16/16 80.5 kg (177 lb 6.4 oz)     HEENT: No erythema of R lower eyelid or nasal sclera. He is anicteric, no facial rash, sufficient saliva pool, no nasopharyngeal ulcers. No mucosal ulcers or evidence of bleed. R pupil small, irregular, less responsive to light. No scleral injection.  Cor: RRR S1 S2  Lungs: CTA  EXTREMITIES: No edema, full pulses, no cyanosis, no nail fold capillary changes   MSK: No synovitis, from all joints   SKIN: No rashes            Data:     Results for orders placed or performed in visit on 03/15/17   CBC with platelets   Result Value Ref Range    WBC 9.0 4.0 - 11.0 10e9/L    RBC Count 4.78 4.4 - 5.9 10e12/L    Hemoglobin 15.3 13.3 - 17.7 g/dL    Hematocrit 44.1 40.0 - 53.0 %    MCV 92 78 - 100 fl    MCH 32.0 26.5 - 33.0 pg    MCHC 34.7 31.5 - 36.5 g/dL    RDW 12.3 10.0 - 15.0 %    Platelet Count 371 150 - 450 10e9/L   Routine UA with microscopic   Result Value Ref Range    Color Urine Yellow     Appearance Urine Clear     Glucose Urine Negative NEG mg/dL    Bilirubin Urine Negative NEG    Ketones Urine Negative NEG mg/dL    Specific Gravity Urine 1.023 1.003 - 1.035    Blood Urine Negative NEG    pH Urine 7.0 5.0 - 7.0 pH    Protein Albumin Urine Negative NEG mg/dL    Urobilinogen mg/dL 0.0 0.0 - 2.0 mg/dL    Nitrite Urine Negative NEG    Leukocyte Esterase Urine Negative NEG    Source Midstream Urine     WBC Urine <1 0 - 2 /HPF    RBC Urine 1 0 - 2 /HPF    Mucous Urine Present (A) NEG /LPF   Creatinine   Result Value Ref Range    Creatinine 1.15 0.66 - 1.25 mg/dL    GFR Estimate 69 >60 mL/min/1.7m2    GFR Estimate If Black 83 >60 mL/min/1.7m2       Recent Labs   Lab Test  09/15/17   0745  03/15/17   0822  09/14/16   0806  03/05/14   0816   01/08/13   0718   04/21/11   0921   WBC  7.3  9.0  7.6  9.3   < >  10.6   < >  8.0   HGB  13.8  15.3  15.0  16.9   < >  15.7    < >  14.7   HCT  40.0  44.1  42.3  47.5   < >  44.9   < >  40.9   MCV  91  92  88  91   < >  91   < >  89   PLT  334  371  334  395   < >  330   < >  287   BUN   --    --    --    --    --    --    --   12   AST   --    --   15  31   --   27   < >  19   ALT   --    --   25  24   --   25   < >  17   ALKPHOS   --    --    --    --    --    --    --   60    < > = values in this interval not displayed.           Quique Gould MD

## 2017-09-18 LAB — ANCA IGG TITR SER IF: NORMAL {TITER}

## 2017-12-15 DIAGNOSIS — Z94.7 HISTORY OF PENETRATING KERATOPLASTY: ICD-10-CM

## 2017-12-15 NOTE — TELEPHONE ENCOUNTER
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 Pt feels that vision is stable/better. No photopsias, floaters are stable. No eye pain. Using drops less consistently. Uses Pred forte only intermittently when eye is irritated/red. Felt that there was a hair in his eye.     POHx:  Granulomatosis polyangiitis/Marylin's  Corneal/Scleral Melts requiring patch grafts      Current Eye Medications:   Tacrolimus/Protopic 1-2 times daily PRN OD  Prednisolone 1-2 times per week  PFATs PRN OD     Assessment & Plan:     Braxton Ayala is a 47 year old male with the following diagnoses:      1. H/o penetrating keratoplasty (PK), Right Eye: from Granulomatosis w/ Polyangiitis                 - most recent flare up was October 2016 (prior to that was January 2014)              - episcleral/scleral inflammation resolved, stromal haze continues, area of epithelialized 70% corneal thinning present prior (small round inferonasal) stable (photos taken 11/16)              - emphasized importance of scheduled tacrolimus/Protopic BID              - can use prednisolone acetate BID PRN              - continue PFAT as needed both eyes for comfort                        -loose suture removed 07/20/17 . Start ofloxacin QID x 4 days OD     2. Granulomatosis w/ polyangiitis: follows Rheumatology at the [place]                        - off  systemic immunosuppression      3. Pseudophakia, Right Eye     Continue present management   Return 6 months annual exam and photos or earlier ALVIN Arriaga MD        ~~~~~~~~~~~~~~~~~~~~~~~~~~~~~~~~~~~~~~~~~~~~~~~~~~~~~~~~~~~~~~~~     Complete documentation of historical and exam elements from today's encounter can be found in the full encounter summary report (not reduplicated in this progress note). I personally obtained the chief complaint(s) and history of present illness.  I confirmed and edited as necessary the review of systems, past medical/surgical history, family history, social history, and examination findings as documented by others.  I examined the patient myself, and I personally reviewed the relevant tests, images, and reports as documented above. I formulated and edited as necessary the assessment and plan and discussed the findings and management plan with the patient and family.      Yovani Law MD, MA  Director, Cornea & Anterior Segment  [facility] Department of Ophthalmology & Visual Neuroscience               Other Notes        Nursing Note from Guerline Madsen COT (Ophthalmology)   HPI   Follow Up For    Additional comments: H/o penetrating keratoplasty, Right Eye      Last edited by Guerline Madsen COT on 7/20/2017  7:37 AM. (History)   Base Eye Exam   Visual Acuity (Snellen - Linear)     Right Left   Dist sc 20/60-1     Dist cc   20/15   Dist ph sc 20/50+1        Correction: Glasses   Tonometry ([facility], 7:45 AM)     Right Left   Pressure 11 17       Pupils     Dark APD   Right irreg None   Left   None      Visual Fields     Right Left   Result Full Full      Extraocular Movement     Right Left   Result Full Full      Neuro/Psych   Oriented x3: Yes   Mood/Affect: Normal      Edited by: Guerline Madsen COT      Slit Lamp and Fundus Exam   External Exam    Right Left   External Normal Normal   Slit Lamp Exam    Right Left   Lids/Lashes Normal Normal    Conjunctiva/Sclera superonasal thinning/uveal show extending about 5mm from limbus White and quiet   Cornea Inferonasal stromal haze extending 3.3w from limbus with 2h (stable). There is a epithelialized pocket 1h x 1w of 70% thinning (present prior). Clear   Anterior Chamber Deep and quiet Deep and quiet   Iris corectopic with superior iridocorneal touch Round and reactive   Lens PCIOL Clear   Edited by: Braxton Arriaga MD         Refraction   Wearing Rx     Sphere Cylinder Axis   Right Balance       Left -3.00 +1.00 087      Type: SVL   Manifest Refraction   Doesn't want MR today      Edited by: Guerline Madsen, COT      Instructions        Return in about 6 months (around 1/20/2018) for Annual Visit.     OP AVS (Printed 7/20/2017)   Additional Documentation   Flowsheets:     Infection Screenings,     Nursing Time        SmartForms:     FV OPHTH HPI        Encounter Info:     Billing Info,     History,     Allergies,     Detailed Report,     Reviewed This Encounter        AVS Reports   Date/Time Report Action User   7/20/2017  8:49 AM OP AVS Printed Nina Koch   Visit Disposition   Disposition   Return in about 6 months (around 1/20/2018) for Annual Visit.         Routing refill request to provider for review/approval because:  Drug not on the [facility], [facility] or  Health refill protocol

## 2017-12-18 RX ORDER — PREDNISOLONE ACETATE 10 MG/ML
1 SUSPENSION/ DROPS OPHTHALMIC DAILY PRN
Qty: 15 ML | Refills: 1 | Status: SHIPPED | OUTPATIENT
Start: 2017-12-18 | End: 2018-07-31

## 2018-01-26 DIAGNOSIS — Z94.7 HISTORY OF PENETRATING KERATOPLASTY: Primary | ICD-10-CM

## 2018-01-30 ENCOUNTER — OFFICE VISIT (OUTPATIENT)
Dept: OPHTHALMOLOGY | Facility: CLINIC | Age: 48
End: 2018-01-30
Attending: OPHTHALMOLOGY
Payer: COMMERCIAL

## 2018-01-30 DIAGNOSIS — M31.30 WEGENER'S GRANULOMATOSIS (GRANULOMATOSIS WITH POLYANGIITIS): Primary | ICD-10-CM

## 2018-01-30 DIAGNOSIS — Z96.1 PSEUDOPHAKIA OF RIGHT EYE: ICD-10-CM

## 2018-01-30 DIAGNOSIS — Z94.7 HISTORY OF PENETRATING KERATOPLASTY: ICD-10-CM

## 2018-01-30 DIAGNOSIS — I77.82 ANCA-ASSOCIATED VASCULITIS (H): ICD-10-CM

## 2018-01-30 PROCEDURE — G0463 HOSPITAL OUTPT CLINIC VISIT: HCPCS | Mod: ZF

## 2018-01-30 PROCEDURE — 92285 EXTERNAL OCULAR PHOTOGRAPHY: CPT | Mod: ZF | Performed by: OPHTHALMOLOGY

## 2018-01-30 ASSESSMENT — VISUAL ACUITY
OD_PH_SC: 20/40
CORRECTION_TYPE: GLASSES
OD_SC: 20/60
OS_SC+: -2
METHOD_MR: DECLINES MR TODAY
METHOD: SNELLEN - LINEAR
OS_SC: 20/10
OD_SC+: -2

## 2018-01-30 ASSESSMENT — TONOMETRY
OD_IOP_MMHG: 09
IOP_METHOD: ICARE
OS_IOP_MMHG: 12

## 2018-01-30 ASSESSMENT — SLIT LAMP EXAM - LIDS
COMMENTS: NORMAL
COMMENTS: NORMAL

## 2018-01-30 ASSESSMENT — EXTERNAL EXAM - LEFT EYE: OS_EXAM: NORMAL

## 2018-01-30 ASSESSMENT — CONF VISUAL FIELD
OS_NORMAL: 1
METHOD: COUNTING FINGERS
OD_NORMAL: 1

## 2018-01-30 ASSESSMENT — REFRACTION_WEARINGRX
OS_SPHERE: -3.00
SPECS_TYPE: SVL
OS_CYLINDER: +1.00
OS_AXIS: 087
OD_SPHERE: BALANCE

## 2018-01-30 ASSESSMENT — EXTERNAL EXAM - RIGHT EYE: OD_EXAM: NORMAL

## 2018-01-30 NOTE — NURSING NOTE
Chief Complaints and History of Present Illnesses   Patient presents with     Follow Up For     Wegener's granulomatosis      HPI    Affected eye(s):  Both   Symptoms:        Frequency:  Constant       Do you have eye pain now?:  No      Comments:  States va is the same since last visit  No red watery or dry  Melany Dill COT 7:20 AM January 30, 2018

## 2018-01-30 NOTE — PROGRESS NOTES
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 Pt feels that vision is stable. No photopsias, floaters are stable. No eye pain. Using drops less consistently. Uses Pred forte only intermittently when eye is irritated/red.   Systemic disease stable off meds per patient.    POHx:  Granulomatosis polyangiitis/Marylin's  Corneal/Scleral Melts requiring patch grafts     Current Eye Medications:   Tacrolimus/Protopic 1-2 times daily PRN OD  Prednisolone 2-3 times per week  PFATs PRN OD: not using    Assessment & Plan:    Braxton Ayala is a 47 year old male with the following diagnoses:     1. H/o penetrating keratoplasty (PK), Right Eye: from Granulomatosis w/ Polyangiitis                - most recent flare up was October 2016 (prior to that was January 2014)              - episcleral/scleral inflammation resolved, stromal haze continues, area of epithelialized 70% corneal thinning present prior (small round inferonasal) stable (photos taken today)              - continue tacrolimus/Protopic BID              - can use prednisolone acetate 2-3x/week              - start PFAT BID for comfort    2. Granulomatosis w/ polyangiitis: follows Rheumatology at the [place]   - off systemic immunosuppression, continue f/u with rheum    3. Pseudophakia, Right Eye    Continue present management   Return 6 months annual exam and photos or earlier as needed, doreen OU    SLAVA Johnson  Cornea fellow      ~~~~~~~~~~~~~~~~~~~~~~~~~~~~~~~~~~~~~~~~~~~~~~~~~~~~~~~~~~~~~~~~    Complete documentation of historical and exam elements from today's encounter can be found in the full encounter summary report (not reduplicated in this progress note). I personally obtained the chief complaint(s) and history of present illness.  I confirmed and edited as necessary  the review of systems, past medical/surgical history, family history, social history, and examination findings as documented by others.  I examined the patient myself, and I personally reviewed the relevant tests, images, and reports as documented above. I formulated and edited as necessary the assessment and plan and discussed the findings and management plan with the patient and family.     I personally interpreted the diagnostic / imaging study and have edited the interpretation as needed.    Yovani Law MD, MA  Director, Cornea & Anterior Segment  [facility] Department of Ophthalmology & Visual Neuroscience

## 2018-01-30 NOTE — MR AVS SNAPSHOT
After Visit Summary   1/30/2018    Braxton Ayala    MRN: [id number]           Patient Information     Date Of Birth          1970        Visit Information        Provider Department      1/30/2018 7:30 AM Yovani Law MD [facility]        Today's Diagnoses     Wegener's granulomatosis (granulomatosis with polyangiitis) (H)    -  1    History of penetrating keratoplasty        Pseudophakia of right eye        ANCA-associated vasculitis (H)           Follow-ups after your visit        Follow-up notes from your care team     Return in about 6 months (around 7/30/2018) for Follow up vision pressure OU, Topography OU.      Your next 10 appointments already scheduled     Sep 14, 2018  7:00 AM CDT   (Arrive by 6:45 AM)   Return Visit with Quique Gould MD   [facility] Rheumatology ([facility] [place])    [address] [place] [address]  [address]  [facility] [address] [phone]              Who to contact     Please call your clinic at [phone] to:    Ask questions about your health    Make or cancel appointments    Discuss your medicines    Learn about your test results    Speak to your doctor   If you have compliments or concerns about an experience at your clinic, or if you wish to file a complaint, please contact [facility] Patient Relations at [phone] or email us at [email or web address]         Additional Information About Your Visit        MyChart Information     [employer] gives you secure access to your electronic health record. If you see a primary care provider, you can also send messages to your care team and make appointments. If you have questions, please call your primary care clinic.  If you do not have a primary care provider, please call [phone] and they will assist you.      [employer] is an electronic gateway that provides easy, online access to your medical records. With [employer], you can request a clinic appointment,  read your test results, renew a prescription or communicate with your care team.     To access your existing account, please contact your [facility] or call [phone] for assistance.        Care EveryWhere ID     This is your Care EveryWhere ID. This could be used by other organizations to access your [place] medical records  [id number]         Blood Pressure from Last 3 Encounters:   09/15/17 (!) 129/91   03/15/17 (!) 165/108   09/14/16 (!) 161/102    Weight from Last 3 Encounters:   09/15/17 80.3 kg (177 lb)   03/15/17 82.1 kg (181 lb)   09/14/16 83.9 kg (185 lb)              We Performed the Following     Slit Lamp Photos OD (right eye)        Primary Care Provider Office Phone # Fax #    Darshan [facility] [phone] [address]  [facility] [address]        Equal Access to Services     CHYNA STRAUSS : Hadii malu ku hadasho Sohaileyali, waaxda luqadaha, qaybta kaalmada adeegyada, maryjane knox . So [facility] [phone].    ATENCIÓN: Si habla español, tiene a leyva disposición servicios gratuitos de asistencia lingüística. Llame al [phone].    We comply with applicable federal civil rights laws and [place] laws. We do not discriminate on the basis of race, color, national origin, age, disability, sex, sexual orientation, or gender identity.            Thank you!     Thank you for choosing EYE CLINIC  for your care. Our goal is always to provide you with excellent care. Hearing back from our patients is one way we can continue to improve our services. Please take a few minutes to complete the written survey that you may receive in the mail after your visit with us. Thank you!             Your Updated Medication List - Protect others around you: Learn how to safely use, store and throw away your medicines at www.disposemymeds.org.          This list is accurate as of 1/30/18  8:41 AM.  Always use your most recent med list.                    Brand Name Dispense Instructions for use Diagnosis    lisinopril 20 MG tablet    PRINIVIL/ZESTRIL     Take 20 mg by mouth    ANCA-associated vasculitis (H)       prednisoLONE acetate 1 % ophthalmic susp    PRED FORTE    15 mL    Place 1 drop into the right eye daily as needed    History of penetrating keratoplasty       PROTOPIC 0.1 % ointment   Generic drug:  tacrolimus     100 g    Apply topically daily in the right eye    Wegener's granulomatosis (granulomatosis with polyangiitis) (H)

## 2018-07-30 DIAGNOSIS — Z94.7 S/P PKP (PENETRATING KERATOPLASTY): Primary | ICD-10-CM

## 2018-07-31 ENCOUNTER — OFFICE VISIT (OUTPATIENT)
Dept: OPHTHALMOLOGY | Facility: CLINIC | Age: 48
End: 2018-07-31
Attending: OPHTHALMOLOGY
Payer: COMMERCIAL

## 2018-07-31 DIAGNOSIS — Z94.7 S/P PKP (PENETRATING KERATOPLASTY): ICD-10-CM

## 2018-07-31 DIAGNOSIS — M31.30 WEGENER'S GRANULOMATOSIS (GRANULOMATOSIS WITH POLYANGIITIS): Primary | ICD-10-CM

## 2018-07-31 DIAGNOSIS — Z94.7 HISTORY OF PENETRATING KERATOPLASTY: ICD-10-CM

## 2018-07-31 PROCEDURE — 92025 CPTRIZED CORNEAL TOPOGRAPHY: CPT | Mod: ZF | Performed by: OPHTHALMOLOGY

## 2018-07-31 PROCEDURE — G0463 HOSPITAL OUTPT CLINIC VISIT: HCPCS | Mod: ZF

## 2018-07-31 RX ORDER — PREDNISOLONE ACETATE 10 MG/ML
1 SUSPENSION/ DROPS OPHTHALMIC EVERY OTHER DAY
Qty: 5 ML | Refills: 6 | Status: SHIPPED | OUTPATIENT
Start: 2018-07-31 | End: 2021-05-06

## 2018-07-31 ASSESSMENT — REFRACTION_WEARINGRX
OD_SPHERE: BALANCE
SPECS_TYPE: SVL
OS_SPHERE: -3.00
OS_CYLINDER: +1.00
OS_AXIS: 087

## 2018-07-31 ASSESSMENT — VISUAL ACUITY
METHOD: SNELLEN - LINEAR
OD_SC: 20/60
OD_SC+: +1
OS_CC: 20/15
OD_PH_SC: 20/40-2
CORRECTION_TYPE: GLASSES

## 2018-07-31 ASSESSMENT — TONOMETRY
OD_IOP_MMHG: 09
IOP_METHOD: ICARE
OS_IOP_MMHG: 11

## 2018-07-31 ASSESSMENT — SLIT LAMP EXAM - LIDS
COMMENTS: NORMAL
COMMENTS: NORMAL

## 2018-07-31 ASSESSMENT — CONF VISUAL FIELD
METHOD: COUNTING FINGERS
OS_NORMAL: 1
OD_NORMAL: 1

## 2018-07-31 ASSESSMENT — EXTERNAL EXAM - LEFT EYE: OS_EXAM: NORMAL

## 2018-07-31 ASSESSMENT — EXTERNAL EXAM - RIGHT EYE: OD_EXAM: NORMAL

## 2018-07-31 NOTE — NURSING NOTE
Chief Complaints and History of Present Illnesses   Patient presents with     Follow Up For     6 month follow up  H/o penetrating keratoplasty (PK), Right Eye     HPI    Affected eye(s):  Both   Symptoms:     No flashes   No redness   No tearing   No Dryness         Do you have eye pain now?:  No      Comments:  Pt states vision is about the same as last visit. Occasional floaters in RE, no changes.    Denisse DENNISON July 31, 2018 7:18 AM

## 2018-07-31 NOTE — MR AVS SNAPSHOT
After Visit Summary   7/31/2018    Braxton Ayala    MRN: [id number]           Patient Information     Date Of Birth          1970        Visit Information        Provider Department      7/31/2018 7:15 AM Yovani Law MD Eye Clinic        Today's Diagnoses     Wegener's granulomatosis (granulomatosis with polyangiitis) (H)    -  1    S/P PKP (penetrating keratoplasty)        History of penetrating keratoplasty           Follow-ups after your visit        Follow-up notes from your care team     Return in about 6 months (around 1/31/2019) for Follow Up.      Your next 10 appointments already scheduled     Sep 14, 2018  7:00 AM CDT   (Arrive by 6:45 AM)   Return Visit with Quique Gould MD   [facility] Rheumatology ([facility] [place])    [address] [place] [address]  [address]  [facility] [address] [phone]              Who to contact     Please call your clinic at [phone] to:    Ask questions about your health    Make or cancel appointments    Discuss your medicines    Learn about your test results    Speak to your doctor            Additional Information About Your Visit        [employer] Information     [employer] gives you secure access to your electronic health record. If you see a primary care provider, you can also send messages to your care team and make appointments. If you have questions, please call your primary care clinic.  If you do not have a primary care provider, please call [phone] and they will assist you.      [employer] is an electronic gateway that provides easy, online access to your medical records. With [employer], you can request a clinic appointment, read your test results, renew a prescription or communicate with your care team.     To access your existing account, please contact your [facility] or call [phone] for assistance.        Care EveryWhere ID     This is your Care EveryWhere ID. This could be used by  other organizations to access your [place] medical records  [id number]         Blood Pressure from Last 3 Encounters:   09/15/17 (!) 129/91   03/15/17 (!) 165/108   09/14/16 (!) 161/102    Weight from Last 3 Encounters:   09/15/17 80.3 kg (177 lb)   03/15/17 82.1 kg (181 lb)   09/14/16 83.9 kg (185 lb)              We Performed the Following     Corneal Topography OU (both eyes)          Today's Medication Changes          These changes are accurate as of 7/31/18  8:23 AM.  If you have any questions, ask your nurse or doctor.               These medicines have changed or have updated prescriptions.        Dose/Directions    prednisoLONE acetate 1 % ophthalmic susp   Commonly known as:  PRED FORTE   This may have changed:    - when to take this  - reasons to take this   Used for:  History of penetrating keratoplasty   Changed by:  Yovani Law MD        Dose:  1 drop   Place 1 drop into the right eye every other day   Quantity:  5 mL   Refills:  6            Where to get your medicines      [facility] [address] [place] [address]  AT [address] [facility] [address]     Phone:  [phone]     prednisoLONE acetate 1 % ophthalmic susp                Primary Care Provider Office Phone # Fax #    Darshan [facility] [phone] [address] [facility] [address]        Equal Access to Services     CHYNA STRAUSS AH: Hadii malu hernandez hadasho Soomaali, waaxda luqadaha, qaybta kaalmada adeegyada, maryjane man. So [facility] [phone].    ATENCIÓN: Si habla español, tiene a leyva disposición servicios gratuitos de asistencia lingüística. Llame al [phone].    We comply with applicable federal civil rights laws and [place] laws. We do not discriminate on the basis of race, color, national origin, age, disability, sex, sexual orientation, or gender identity.            Thank you!      Thank you for choosing EYE CLINIC  for your care. Our goal is always to provide you with excellent care. Hearing back from our patients is one way we can continue to improve our services. Please take a few minutes to complete the written survey that you may receive in the mail after your visit with us. Thank you!             Your Updated Medication List - Protect others around you: Learn how to safely use, store and throw away your medicines at www.disposemymeds.org.          This list is accurate as of 7/31/18  8:23 AM.  Always use your most recent med list.                   Brand Name Dispense Instructions for use Diagnosis    lisinopril 20 MG tablet    PRINIVIL/ZESTRIL     Take 20 mg by mouth    ANCA-associated vasculitis (H)       prednisoLONE acetate 1 % ophthalmic susp    PRED FORTE    5 mL    Place 1 drop into the right eye every other day    History of penetrating keratoplasty       PROTOPIC 0.1 % ointment   Generic drug:  tacrolimus     100 g    Apply topically daily in the right eye    Wegener's granulomatosis (granulomatosis with polyangiitis) (H)

## 2018-07-31 NOTE — PROGRESS NOTES
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     Interval history    Reports vision stable, no pain, no new floaters or flashes.     POHx:  Granulomatosis polyangiitis/Marylin's  Corneal/Scleral Melts requiring patch grafts     Current Eye Medications:   Tacrolimus/Protopic 2 times daily PRN OD  Prednisolone 2-3 times per week  PFATs PRN OD: not using     Assessment & Plan:    1. H/o penetrating keratoplasty (PK), Right Eye: from Granulomatosis w/ Polyangiitis                - most recent flare up was October 2016 (prior to that was January 2014)              - episcleral/scleral inflammation resolved, stromal haze continues, area of epithelialized 70% corneal thinning present prior (small round inferonasal) stable (photos taken today)                - continue tacrolimus/Protopic BID              - continue prednisolone acetate 2-3x/week              - continue PFAT BID for comfort    2. Granulomatosis w/ polyangiitis: follows Rheumatology at the [place]      - off systemic immunosuppression, continue f/u with rheum    3. Pseudophakia, Right Eye  Stable, monitor     return to clinic: 6 months     Pj Gracia MD  Ophthalmology Resident, PGY-3  [facility]      ~~~~~~~~~~~~~~~~~~~~~~~~~~~~~~~~~~~~~~~~~~~~~~~~~~~~~~~~~~~~~~~~    Complete documentation of historical and exam elements from today's encounter can be found in the full encounter summary report (not reduplicated in this progress note). I personally obtained the chief complaint(s) and history of present illness.  I confirmed and edited as necessary the review of systems, past medical/surgical history, family history, social history, and examination findings as documented by others.  I examined the patient myself, and I personally reviewed the relevant tests, images, and reports as documented above. I  formulated and edited as necessary the assessment and plan and discussed the findings and management plan with the patient and family.     I personally interpreted the diagnostic / imaging study and have edited the interpretation as needed.    Yovani Law MD, MA  Director, Cornea & Anterior Segment  [facility] Department of Ophthalmology & Visual Neuroscience

## 2018-09-10 NOTE — PROGRESS NOTES
"[facility]  Rheumatology Clinic  Quique Gould MD  2018     Name: Braxton Ayala  MRN: [id number]  Age: 48 year old  : 1970  [facility]     Assessment and Plan:  # ANCA-associated vasculitis (H)  # Limited Wegener's Granulomatosis (R corneal \"melt\" , s/p 9 months of Cytoxan infusion completed , s/p 18 mos maintenance Rx with imuran, off imuran since 9-10. Corneal graft failure and repeat corneal transplant in ; Rx MMF 1000 qd from  through  correlated with quiescence).    He continues to remain stable off of systemic immunosuppression with no new symptoms and no symptome progression. Physical exam today is relatively unchanged from previous exam. He is following with ophthalmology who note that his eye exam remains stable and that they will continue with current topical therapy. Given that the disease remains in remission, he does require systemic immunosuppression and we can continue with current \"watchful waiting.\" We will screen for subclinical disease with Cr, UA, and CBC. Rituximab would be a good choice if graft-threatening eye disease recurs.     - Obtain labs today (CBC with platelets, Cr, Routine UA with Micro Reflex to Culture)   - Plan continue with Protopic (tacrolimus) treatment daily and steroid (dexamethasone) as needed, per Dr. Law in Ophthalmology    # Essential hypertension: We agree with continuing lisinopril. He is following with a PCP and monitoring his BP at home. Monitor BP and Cr regularly; adjust anti-hypertensive dose aiming for BP < 130/80.    # Seasonal allergies: Most prevalent in fall and spring; flaring a little at the moment.    # Lateral right hand numbness: Has seen orthopedics recently where an EMG was obtained and thought to be normal. The did not recommend any therapy. We will continue to monitor.     Follow-up: Return in about 1 year (around 2019).       Hilario Orr MS4  Rheumatology    I saw this patient " "with the medical student, who acted as scribe for me. I agree with the findings and recommendations.    Quique Gould M.D.  Staff Rheumatologist, [facility]  Pager [phone]      HPI:   Braxton Ayala has a history of Wegener's Granulomatosis and presents for follow up. He was last seen on 9/15/17 at which time he was doing well, and used dexamethasone \"about once weekly\" to manage any irritation/soreness/dryness in the eyes. His last episode of corneal \"melt\" was January 2014. For his essential hypertension, he reported increased adherence to lisinopril-hydrochlorothiazide, changing his diet to low refined sugar and carbs, monitoring his BP at home, and working with primary care to manage cholesterol. His occasional right shoulder pain was stable. Plan at that time was to continue with Protopic (tacrolimus) treatment daily and steroid (dexamethasone) as needed, per Dr. Law in Ophthalmology. Rituximab would be a good choice if graft-threatening eye disease recurs.     Today, the patient states that he has been doing well for the past year. He visits his Ophthalmologist twice a year and he reports everything has appeared normal in that regard. He states that his last Ophthalmology visit was a couple months ago and that he is currently using ointment and eyedrops daily. He reports that his blood pressure has been stable and that he continues to use lisinopril. He mentions recently having a nerve conduction test done in his right arm due to diminished hand strength and numbness in his right elbow and right 4th and 5th digits, but he reports that the test came back normal. He complains that his knees have felt \"off\" occasionally, particularly when he is walking and makes a turn. He notes previously losing 10-15 pounds, but more recently has been slowly gaining it back.     Recent note from Ophthalmology (7/31/18 with Dr. Law) states: episcleral/scleral inflammation resolved, stromal haze continues, area of epithelialized " "70% corneal thinning present prior (small round inferonasal) stable (photos taken during visit).     Review of Systems:   Pertinent items are noted in HPI or as below, remainder of complete ROS is negative.      No recent problems with hearing or vision. No swallowing problems.   No breathing difficulty, shortness of breath, coughing, or wheezing  No chest pain or palpitations  No heart burn, indigestion, abdominal pain, nausea, vomiting, diarrhea  No urination problems, no bloody, cloudy urine, no dysuria  No tingling, weakness  No headaches or confusion  No rashes. No easy bleeding or bruising.   +numbness in right elbow and 5th digit    Active Medications:     Current Outpatient Prescriptions:      lisinopril (PRINIVIL/ZESTRIL) 20 MG tablet, Take 20 mg by mouth, Disp: , Rfl:      prednisoLONE acetate (PRED FORTE) 1 % ophthalmic susp, Place 1 drop into the right eye every other day, Disp: 5 mL, Rfl: 6     PROTOPIC 0.1 % ointment, Apply topically daily in the right eye, Disp: 100 g, Rfl: 11      Allergies:   Nkda [no known drug allergies]      Past Medical History:  ANCA associated vasculitis, Limited WG presenting with corneal melt in 2008, s/p 9 months of cytoxan infusion finished in july 2008, started on Imuran 150mg/d (8/08) and prednsione was tapered off by the end of sept 2008, s/p corneal transplant graft R eye march 2009, developed Cataracts,s/p R eye catracts surgery 3-10. Imuran tapered off 9-10. \"Rejection\" of graft 9-10 treated with oral prednisone and steroid drops. 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 New graft placed 4-11.  Hypertension  Nonsenile cataract  Marylin's disease     Past Surgical History:  Right eye removal and replacement of suture  Corneal transplant, penetrating x2  Cataract IOL bilateral    Family History:   No family history of - macular degeneration, retinal detachment, amblyopia     Social History:   No smoking or tobacco use  Occasional alcohol use   "   Physical Exam:   /89  Pulse 77  Temp 97.9  F (36.6  C) (Oral)  Wt 86.2 kg (190 lb)  SpO2 96%  BMI 26.13 kg/m2   Wt Readings from Last 4 Encounters:   09/14/18 86.2 kg (190 lb)   09/15/17 80.3 kg (177 lb)   03/15/17 82.1 kg (181 lb)   09/14/16 83.9 kg (185 lb)     Constitutional: Well-developed, appearing stated age; cooperative  Eyes: Right eye has an asymmetric pupil with some cloudiness in the cornea. Left eye PERRL. Normal EOM, conjunctiva, and sclera bilaterally  ENT: Normal external ears, nose, hearing, lips, teeth, gums, throat. No mucous membrane lesions, normal saliva pool  Neck: No mass or thyroid enlargement  Resp: Lungs clear to auscultation, nl to palpation  CV: RRR, no murmurs, rubs or gallops, no edema  GI: No ABD mass or tenderness, no HSM  : Not tested  Lymph: No cervical, supraclavicular, inguinal or epitrochlear nodes  MS: Finger extension and finger strength is symmetrical and normal. Slight discomfort with palpation of the medial epicondyle on the right. Dupuytren contracture at the base of the right 4th digit. The TMJ, neck, shoulder, wrist, spine, hip, knee, ankle, and foot MTP/IP joints were examined and found normal. No active synovitis or altered joint anatomy. Full joint ROM. Normal  strength. No dactylitis,  tenosynovitis, enthesopathy.  Skin: No nail pitting, alopecia, rash, nodules or lesions  Neuro: Normal cranial nerves, strength, sensation, DTRs.   Psych: Normal judgement, orientation, memory, affect.     Laboratory:   Component      Latest Ref Rng & Units 3/15/2017 9/15/2017   Color Urine       Yellow Yellow   Appearance Urine       Clear Clear   Glucose Urine      NEG:Negative mg/dL Negative Negative   Bilirubin Urine      NEG:Negative Negative Negative   Ketones Urine      NEG:Negative mg/dL Negative Negative   Specific Gravity Urine      1.003 - 1.035 1.023 1.027   Blood Urine      NEG:Negative Negative Negative   pH Urine      5.0 - 7.0 pH 7.0 5.0   Protein  Albumin Urine      NEG:Negative mg/dL Negative Negative   Urobilinogen mg/dL      0.0 - 2.0 mg/dL 0.0 0.0   Nitrite Urine      NEG:Negative Negative Negative   Leukocyte Esterase Urine      NEG:Negative Negative Negative   Source       Midstream Urine Midstream Urine   WBC Urine      0 - 2 /HPF <1 1   RBC Urine      0 - 2 /HPF 1 <1   Squamous Epithelial /HPF Urine      0 - 1 /HPF  <1   Mucous Urine      NEG:Negative /LPF Present (A) Present (A)   WBC      4.0 - 11.0 10e9/L 9.0 7.3   RBC Count      4.4 - 5.9 10e12/L 4.78 4.39 (L)   Hemoglobin      13.3 - 17.7 g/dL 15.3 13.8   Hematocrit      40.0 - 53.0 % 44.1 40.0   MCV      78 - 100 fl 92 91   MCH      26.5 - 33.0 pg 32.0 31.4   MCHC      31.5 - 36.5 g/dL 34.7 34.5   RDW      10.0 - 15.0 % 12.3 12.3   Platelet Count      150 - 450 10e9/L 371 334   Creatinine      0.66 - 1.25 mg/dL 1.15 1.18   GFR Estimate      >60 mL/min/1.7m2 69 67   GFR Estimate If Black      >60 mL/min/1.7m2 83 81   CRP Inflammation      0.0 - 8.0 mg/L  <2.9   Neutrophil Cytoplasmic IgG Antibody      <1:20  <1:20     RHEUM RESULTS Latest Ref Rng & Units 3/15/2017 9/15/2017 9/14/2018   SED RATE 0 - 15 mm/h - - -   CRP, INFLAMMATION 0.0 - 8.0 mg/L - <2.9 -   AST 0 - 45 U/L - - -   ALT 0 - 70 U/L - - -   ALBUMIN 3.9 - 5.1 g/dL - - -   WBC 4.0 - 11.0 10e9/L 9.0 7.3 7.1   RBC 4.4 - 5.9 10e12/L 4.78 4.39(L) 4.62   HGB 13.3 - 17.7 g/dL 15.3 13.8 15.1   HCT 40.0 - 53.0 % 44.1 40.0 42.9   MCV 78 - 100 fl 92 91 93   MCHC 31.5 - 36.5 g/dL 34.7 34.5 35.2   RDW 10.0 - 15.0 % 12.3 12.3 12.0    - 450 10e9/L 371 334 347   CREATININE 0.66 - 1.25 mg/dL 1.15 1.18 1.32(H)   GFR ESTIMATE, IF BLACK >60 mL/min/1.7m2 83 81 71   GFR ESTIMATE >60 mL/min/1.7m2 69 67 58(L)    - 1620 mg/dL - - -        ,   Neutrophil Cytoplasmic IgG Antibody   Date Value Ref Range Status   09/15/2017 <1:20 <1:20 Final     Comment:     (Note)  The ANCA IFA is <1:20; therefore, no further testing will   be  performed.  INTERPRETIVE INFORMATION: Anti-Neutrophil Cyto Ab, IgG  Neutrophil Cytoplasmic Antibodies (C-ANCA = granular   cytoplasmic staining, P-ANCA = perinuclear staining) are   found in the serum of over 90 percent of patients with   certain necrotizing systemic vasculitides, and usually in   less than 5 percent of patients with collagen vascular   disease or arthritis.  Performed by [facility],  [address] [place] [id number] [phone]  [email or web address], Tramaine Allison MD, Lab. Director         Scribe Disclosure:   I, Jose Everett, am serving as a scribe to document services personally performed by Quique Gould MD at this visit, based upon the provider's statements to me. All documentation has been reviewed by the aforementioned provider prior to being entered into the official medical record.     Portions of this medical record were completed by a scribe. UPON MY REVIEW AND AUTHENTICATION BY ELECTRONIC SIGNATURE, this confirms (a) I performed the applicable clinical services, and (b) the record is accurate.  Quique Gould MD  Staff RheumatologistBRYAN

## 2018-09-14 ENCOUNTER — OFFICE VISIT (OUTPATIENT)
Dept: RHEUMATOLOGY | Facility: CLINIC | Age: 48
End: 2018-09-14
Attending: INTERNAL MEDICINE
Payer: COMMERCIAL

## 2018-09-14 VITALS
TEMPERATURE: 97.9 F | SYSTOLIC BLOOD PRESSURE: 126 MMHG | OXYGEN SATURATION: 96 % | HEART RATE: 77 BPM | WEIGHT: 190 LBS | DIASTOLIC BLOOD PRESSURE: 89 MMHG | BODY MASS INDEX: 26.13 KG/M2

## 2018-09-14 DIAGNOSIS — I77.82 ANCA-ASSOCIATED VASCULITIS (H): ICD-10-CM

## 2018-09-14 DIAGNOSIS — I77.82 ANCA-ASSOCIATED VASCULITIS (H): Primary | ICD-10-CM

## 2018-09-14 LAB
ALBUMIN UR-MCNC: NEGATIVE MG/DL
APPEARANCE UR: CLEAR
BILIRUB UR QL STRIP: NEGATIVE
COLOR UR AUTO: YELLOW
CREAT SERPL-MCNC: 1.32 MG/DL (ref 0.66–1.25)
ERYTHROCYTE [DISTWIDTH] IN BLOOD BY AUTOMATED COUNT: 12 % (ref 10–15)
GFR SERPL CREATININE-BSD FRML MDRD: 58 ML/MIN/1.7M2
GLUCOSE UR STRIP-MCNC: NEGATIVE MG/DL
HCT VFR BLD AUTO: 42.9 % (ref 40–53)
HGB BLD-MCNC: 15.1 G/DL (ref 13.3–17.7)
HGB UR QL STRIP: NEGATIVE
KETONES UR STRIP-MCNC: NEGATIVE MG/DL
LEUKOCYTE ESTERASE UR QL STRIP: NEGATIVE
MCH RBC QN AUTO: 32.7 PG (ref 26.5–33)
MCHC RBC AUTO-ENTMCNC: 35.2 G/DL (ref 31.5–36.5)
MCV RBC AUTO: 93 FL (ref 78–100)
MUCOUS THREADS #/AREA URNS LPF: PRESENT /LPF
NITRATE UR QL: NEGATIVE
PH UR STRIP: 5 PH (ref 5–7)
PLATELET # BLD AUTO: 347 10E9/L (ref 150–450)
RBC # BLD AUTO: 4.62 10E12/L (ref 4.4–5.9)
RBC #/AREA URNS AUTO: <1 /HPF (ref 0–2)
SOURCE: ABNORMAL
SP GR UR STRIP: 1.02 (ref 1–1.03)
SQUAMOUS #/AREA URNS AUTO: <1 /HPF (ref 0–1)
UROBILINOGEN UR STRIP-MCNC: 0 MG/DL (ref 0–2)
WBC # BLD AUTO: 7.1 10E9/L (ref 4–11)
WBC #/AREA URNS AUTO: 1 /HPF (ref 0–5)

## 2018-09-14 PROCEDURE — 82565 ASSAY OF CREATININE: CPT | Performed by: INTERNAL MEDICINE

## 2018-09-14 PROCEDURE — G0463 HOSPITAL OUTPT CLINIC VISIT: HCPCS | Mod: ZF

## 2018-09-14 PROCEDURE — 36415 COLL VENOUS BLD VENIPUNCTURE: CPT | Performed by: INTERNAL MEDICINE

## 2018-09-14 PROCEDURE — 81001 URINALYSIS AUTO W/SCOPE: CPT | Performed by: INTERNAL MEDICINE

## 2018-09-14 PROCEDURE — 85027 COMPLETE CBC AUTOMATED: CPT | Performed by: INTERNAL MEDICINE

## 2018-09-14 ASSESSMENT — PAIN SCALES - GENERAL: PAINLEVEL: NO PAIN (0)

## 2018-09-14 NOTE — LETTER
"2018      RE: Braxton Ayala  [address] [facility] [address] [facility] [id number]       [facility]  Rheumatology Clinic  Quique Gould MD  2018     Name: Braxton Ayala  MRN: [id number]  Age: 48 year old  : 1970  Referring provider: [facility]     Assessment and Plan:  # ANCA-associated vasculitis (H)  # Limited Wegener's Granulomatosis (R corneal \"melt\" , s/p 9 months of Cytoxan infusion completed , s/p 18 mos maintenance Rx with imuran, off imuran since 9-10. Corneal graft failure and repeat corneal transplant in ; Rx MMF 1000 qd from  through  correlated with quiescence).    He continues to remain stable off of systemic immunosuppression with no new symptoms and no symptome progression. Physical exam today is relatively unchanged from previous exam. He is following with ophthalmology who note that his eye exam remains stable and that they will continue with current topical therapy. Given that the disease remains in remission, he does require systemic immunosuppression and we can continue with current \"watchful waiting.\" We will screen for subclinical disease with Cr, UA, and CBC. Rituximab would be a good choice if graft-threatening eye disease recurs.     - Obtain labs today (CBC with platelets, Cr, Routine UA with Micro Reflex to Culture)   - Plan continue with Protopic (tacrolimus) treatment daily and steroid (dexamethasone) as needed, per Dr. Law in Ophthalmology    # Essential hypertension:  We agree with continuing lisinopril . He is following with a PCP and monitoring his BP at home. Monitor BP and Cr regularly; adjust anti-hypertensive dose aiming for BP < 130/80.    # Seasonal allergies: Most prevalent in fall and spring; flaring a little at the moment.    # Lateral right hand numbness: Has seen orthopedics recently where an EMG was obtained and thought to be normal. The did not recommend any therapy. We will continue to monitor.     Follow-up: Return in about 1 year " "(around 9/14/2019).       Hilario Orr MS4  Rheumatology    I saw this patient with the medical student, who acted as scribe for me. I agree with the findings and recommendations.    Quique Gould M.D.  Staff Rheumatologist, [facility]  Pager [phone]      HPI:   Braxton Ayala has a history of Wegener's Granulomatosis and presents for follow up. He was last seen on 9/15/17 at which time he was doing well, and used dexamethasone \"about once weekly\" to manage any irritation/soreness/dryness in the eyes. His last episode of corneal \"melt\" was January 2014. For his essential hypertension, he reported increased adherence to lisinopril-hydrochlorothiazide, changing his diet to low refined sugar and carbs, monitoring his BP at home, and working with primary care to manage cholesterol. His occasional right shoulder pain was stable. Plan at that time was to continue with Protopic (tacrolimus) treatment daily and steroid (dexamethasone) as needed, per Dr. Law in Ophthalmology. Rituximab would be a good choice if graft-threatening eye disease recurs.     Today, the patient states that he has been doing well for the past year. He visits his Ophthalmologist twice a year and he reports everything has appeared normal in that regard. He states that his last Ophthalmology visit was a couple months ago and that he is currently using ointment and eyedrops daily. He reports that his blood pressure has been stable and that he continues to use lisinopril. He mentions recently having a nerve conduction test done in his right arm due to diminished hand strength and numbness in his right elbow and right 4th and 5th digits, but he reports that the test came back normal. He complains that his knees have felt \"off\" occasionally, particularly when he is walking and makes a turn. He notes previously losing 10-15 pounds, but more recently has been slowly gaining it back.     Recent note from Ophthalmology (7/31/18 with Dr. Law) states: " "episcleral/scleral inflammation resolved, stromal haze continues, area of epithelialized 70% corneal thinning present prior (small round inferonasal) stable (photos taken during visit).     Review of Systems:   Pertinent items are noted in HPI or as below, remainder of complete ROS is negative.      No recent problems with hearing or vision. No swallowing problems.   No breathing difficulty, shortness of breath, coughing, or wheezing  No chest pain or palpitations  No heart burn, indigestion, abdominal pain, nausea, vomiting, diarrhea  No urination problems, no bloody, cloudy urine, no dysuria  No tingling, weakness  No headaches or confusion  No rashes. No easy bleeding or bruising.   +numbness in right elbow and 5th digit    Active Medications:     Current Outpatient Prescriptions:      lisinopril (PRINIVIL/ZESTRIL) 20 MG tablet, Take 20 mg by mouth, Disp: , Rfl:      prednisoLONE acetate (PRED FORTE) 1 % ophthalmic susp, Place 1 drop into the right eye every other day, Disp: 5 mL, Rfl: 6     PROTOPIC 0.1 % ointment, Apply topically daily in the right eye, Disp: 100 g, Rfl: 11      Allergies:   Nkda [no known drug allergies]      Past Medical History:  ANCA associated vasculitis, Limited WG presenting with corneal melt in 2008, s/p 9 months of cytoxan infusion finished in july 2008, started on Imuran 150mg/d (8/08) and prednsione was tapered off by the end of sept 2008, s/p corneal transplant graft R eye march 2009, developed Cataracts,s/p R eye catracts surgery 3-10. Imuran tapered off 9-10. \"Rejection\" of graft 9-10 treated with oral prednisone and steroid drops. 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 New graft placed 4-11.  Hypertension  Nonsenile cataract  Marylin's disease     Past Surgical History:  Right eye removal and replacement of suture  Corneal transplant, penetrating x2  Cataract IOL bilateral    Family History:   No family history of - macular degeneration, retinal detachment, " amblyopia     Social History:   No smoking or tobacco use  Occasional alcohol use     Physical Exam:   /89  Pulse 77  Temp 97.9  F (36.6  C) (Oral)  Wt 86.2 kg (190 lb)  SpO2 96%  BMI 26.13 kg/m2   Wt Readings from Last 4 Encounters:   09/14/18 86.2 kg (190 lb)   09/15/17 80.3 kg (177 lb)   03/15/17 82.1 kg (181 lb)   09/14/16 83.9 kg (185 lb)     Constitutional: Well-developed, appearing stated age; cooperative  Eyes: Right eye has an asymmetric pupil with some cloudiness in the cornea. Left eye PERRL. Normal EOM, conjunctiva, and sclera bilaterally  ENT: Normal external ears, nose, hearing, lips, teeth, gums, throat. No mucous membrane lesions, normal saliva pool  Neck: No mass or thyroid enlargement  Resp: Lungs clear to auscultation, nl to palpation  CV: RRR, no murmurs, rubs or gallops, no edema  GI: No ABD mass or tenderness, no HSM  : Not tested  Lymph: No cervical, supraclavicular, inguinal or epitrochlear nodes  MS: Finger extension and finger strength is symmetrical and normal. Slight discomfort with palpation of the medial epicondyle on the right. Dupuytren contracture at the base of the right 4th digit. The TMJ, neck, shoulder, wrist, spine, hip, knee, ankle, and foot MTP/IP joints were examined and found normal. No active synovitis or altered joint anatomy. Full joint ROM. Normal  strength. No dactylitis,  tenosynovitis, enthesopathy.  Skin: No nail pitting, alopecia, rash, nodules or lesions  Neuro: Normal cranial nerves, strength, sensation, DTRs.   Psych: Normal judgement, orientation, memory, affect.     Laboratory:   Component      Latest Ref Rng & Units 3/15/2017 9/15/2017   Color Urine       Yellow Yellow   Appearance Urine       Clear Clear   Glucose Urine      NEG:Negative mg/dL Negative Negative   Bilirubin Urine      NEG:Negative Negative Negative   Ketones Urine      NEG:Negative mg/dL Negative Negative   Specific Gravity Urine      1.003 - 1.035 1.023 1.027   Blood Urine       NEG:Negative Negative Negative   pH Urine      5.0 - 7.0 pH 7.0 5.0   Protein Albumin Urine      NEG:Negative mg/dL Negative Negative   Urobilinogen mg/dL      0.0 - 2.0 mg/dL 0.0 0.0   Nitrite Urine      NEG:Negative Negative Negative   Leukocyte Esterase Urine      NEG:Negative Negative Negative   Source       Midstream Urine Midstream Urine   WBC Urine      0 - 2 /HPF <1 1   RBC Urine      0 - 2 /HPF 1 <1   Squamous Epithelial /HPF Urine      0 - 1 /HPF  <1   Mucous Urine      NEG:Negative /LPF Present (A) Present (A)   WBC      4.0 - 11.0 10e9/L 9.0 7.3   RBC Count      4.4 - 5.9 10e12/L 4.78 4.39 (L)   Hemoglobin      13.3 - 17.7 g/dL 15.3 13.8   Hematocrit      40.0 - 53.0 % 44.1 40.0   MCV      78 - 100 fl 92 91   MCH      26.5 - 33.0 pg 32.0 31.4   MCHC      31.5 - 36.5 g/dL 34.7 34.5   RDW      10.0 - 15.0 % 12.3 12.3   Platelet Count      150 - 450 10e9/L 371 334   Creatinine      0.66 - 1.25 mg/dL 1.15 1.18   GFR Estimate      >60 mL/min/1.7m2 69 67   GFR Estimate If Black      >60 mL/min/1.7m2 83 81   CRP Inflammation      0.0 - 8.0 mg/L  <2.9   Neutrophil Cytoplasmic IgG Antibody      <1:20  <1:20     RHEUM RESULTS Latest Ref Rng & Units 3/15/2017 9/15/2017 9/14/2018   SED RATE 0 - 15 mm/h - - -   CRP, INFLAMMATION 0.0 - 8.0 mg/L - <2.9 -   AST 0 - 45 U/L - - -   ALT 0 - 70 U/L - - -   ALBUMIN 3.9 - 5.1 g/dL - - -   WBC 4.0 - 11.0 10e9/L 9.0 7.3 7.1   RBC 4.4 - 5.9 10e12/L 4.78 4.39(L) 4.62   HGB 13.3 - 17.7 g/dL 15.3 13.8 15.1   HCT 40.0 - 53.0 % 44.1 40.0 42.9   MCV 78 - 100 fl 92 91 93   MCHC 31.5 - 36.5 g/dL 34.7 34.5 35.2   RDW 10.0 - 15.0 % 12.3 12.3 12.0    - 450 10e9/L 371 334 347   CREATININE 0.66 - 1.25 mg/dL 1.15 1.18 1.32(H)   GFR ESTIMATE, IF BLACK >60 mL/min/1.7m2 83 81 71   GFR ESTIMATE >60 mL/min/1.7m2 69 67 58(L)    - 1620 mg/dL - - -        ,   Neutrophil Cytoplasmic IgG Antibody   Date Value Ref Range Status   09/15/2017 <1:20 <1:20 Final     Comment:     (Note)  The  ANCA IFA is <1:20; therefore, no further testing will   be performed.  INTERPRETIVE INFORMATION: Anti-Neutrophil Cyto Ab, IgG  Neutrophil Cytoplasmic Antibodies (C-ANCA = granular   cytoplasmic staining, P-ANCA = perinuclear staining) are   found in the serum of over 90 percent of patients with   certain necrotizing systemic vasculitides, and usually in   less than 5 percent of patients with collagen vascular   disease or arthritis.  Performed by [facility],  [address] [place] [id number] [phone]  [email or web address], Tramaine Allison MD, Lab. Director         Scribe Disclosure:   I, Jose Everett, am serving as a scribe to document services personally performed by Quique Gould MD at this visit, based upon the provider's statements to me. All documentation has been reviewed by the aforementioned provider prior to being entered into the official medical record.     Portions of this medical record were completed by a scribe. UPON MY REVIEW AND AUTHENTICATION BY ELECTRONIC SIGNATURE, this confirms (a) I performed the applicable clinical services, and (b) the record is accurate.  Quique Gould MD  Staff Rheumatologist, [facility]

## 2018-09-14 NOTE — MR AVS SNAPSHOT
After Visit Summary   9/14/2018    Braxton Ayala    MRN: [id number]           Patient Information     Date Of Birth          1970        Visit Information        Provider Department      9/14/2018 7:00 AM Quique Gould MD [facility] Rheumatology        Today's Diagnoses     ANCA-associated vasculitis (H)    -  1      Care Instructions    Dx:  Limited GPA with corneal melt, quiescent  Plan continue local eye treatment, monitor and treat blood pressure; return in 1 year.          Follow-ups after your visit        Follow-up notes from your care team     Return in about 1 year (around 9/14/2019).      Your next 10 appointments already scheduled     Jan 29, 2019  7:15 AM CST   RETURN CORNEA with Yovani Law MD   Eye Clinic ([facility])    [address]  [facility] [id number] [phone]            Sep 13, 2019  7:00 AM CDT   (Arrive by 6:45 AM)   Return Visit with Quique Gould MD   [facility] Rheumatology ([facility])    [address]  [address]  [facility] [address] [phone]              Who to contact     If you have questions or need follow up information about today's clinic visit or your schedule please contact [facility] RHEUMATOLOGY directly at [phone].  Normal or non-critical lab and imaging results will be communicated to you by MyChart, letter or phone within 4 business days after the clinic has received the results. If you do not hear from us within 7 days, please contact the clinic through DocVuehart or phone. If you have a critical or abnormal lab result, we will notify you by phone as soon as possible.  Submit refill requests through Vinted or call your pharmacy and they will forward the refill request to us. Please allow 3 business days for your refill to be completed.          Additional Information About Your Visit        DocVueharMeet My Friends Information     Vinted gives you  secure access to your electronic health record. If you see a primary care provider, you can also send messages to your care team and make appointments. If you have questions, please call your primary care clinic.  If you do not have a primary care provider, please call [phone] and they will assist you.        Care EveryWhere ID     This is your Care EveryWhere ID. This could be used by other organizations to access your [place] medical records  [id number]        Your Vitals Were     Pulse Temperature Pulse Oximetry BMI (Body Mass Index)          77 97.9  F (36.6  C) (Oral) 96% 26.13 kg/m2         Blood Pressure from Last 3 Encounters:   09/14/18 126/89   09/15/17 (!) 129/91   03/15/17 (!) 165/108    Weight from Last 3 Encounters:   09/14/18 86.2 kg (190 lb)   09/15/17 80.3 kg (177 lb)   03/15/17 82.1 kg (181 lb)               Primary Care Provider Office Phone # Fax #    [facility] [phone] [address] [address]        Equal Access to Services     CHYNA STRAUSS : Hadii malu ku hadasho Soomaali, waaxda luqadaha, qaybta kaalmada adeegyada, maryjane knox . So [facility] [phone].    ATENCIÓN: Si habla español, tiene a leyva disposición servicios gratuitos de asistencia lingüística. Augusto al [phone].    We comply with applicable federal civil rights laws and [place] laws. We do not discriminate on the basis of race, color, national origin, age, disability, sex, sexual orientation, or gender identity.            Thank you!     Thank you for choosing [facility] RHEUMATOLOGY  for your care. Our goal is always to provide you with excellent care. Hearing back from our patients is one way we can continue to improve our services. Please take a few minutes to complete the written survey that you may receive in the mail after your visit with us. Thank you!             Your Updated Medication List - Protect others around you: Learn how to  safely use, store and throw away your medicines at www.disposemymeds.org.          This list is accurate as of 9/14/18 11:59 PM.  Always use your most recent med list.                   Brand Name Dispense Instructions for use Diagnosis    lisinopril 20 MG tablet    PRINIVIL/ZESTRIL     Take 20 mg by mouth    ANCA-associated vasculitis (H)       prednisoLONE acetate 1 % ophthalmic susp    PRED FORTE    5 mL    Place 1 drop into the right eye every other day    History of penetrating keratoplasty       PROTOPIC 0.1 % ointment   Generic drug:  tacrolimus     100 g    Apply topically daily in the right eye    Wegener's granulomatosis (granulomatosis with polyangiitis) (H)

## 2018-09-14 NOTE — NURSING NOTE
Chief Complaint   Patient presents with     RECHECK     ANCA-associated vasculitis      /89  Pulse 77  Temp 97.9  F (36.6  C) (Oral)  Wt 86.2 kg (190 lb)  SpO2 96%  BMI 26.13 kg/m2  Ca Fry MA

## 2018-09-14 NOTE — PATIENT INSTRUCTIONS
Dx:  Limited GPA with corneal melt, quiescent  Plan continue local eye treatment, monitor and treat blood pressure; return in 1 year.

## 2019-01-29 ENCOUNTER — OFFICE VISIT (OUTPATIENT)
Dept: OPHTHALMOLOGY | Facility: CLINIC | Age: 49
End: 2019-01-29
Attending: OPHTHALMOLOGY
Payer: COMMERCIAL

## 2019-01-29 DIAGNOSIS — Z94.7 S/P PKP (PENETRATING KERATOPLASTY): Primary | ICD-10-CM

## 2019-01-29 DIAGNOSIS — M31.30 WEGENER'S GRANULOMATOSIS (GRANULOMATOSIS WITH POLYANGIITIS): ICD-10-CM

## 2019-01-29 PROCEDURE — G0463 HOSPITAL OUTPT CLINIC VISIT: HCPCS | Mod: ZF

## 2019-01-29 ASSESSMENT — VISUAL ACUITY
METHOD: SNELLEN - LINEAR
OD_PH_SC: 20/25
OD_SC: 20/50 SLOW
OS_CC: 20/15
CORRECTION_TYPE: GLASSES
OD_SC+: -2

## 2019-01-29 ASSESSMENT — TONOMETRY
IOP_METHOD: ICARE
OS_IOP_MMHG: 13
OD_IOP_MMHG: 10

## 2019-01-29 ASSESSMENT — CONF VISUAL FIELD
OS_NORMAL: 1
OD_NORMAL: 1
METHOD: COUNTING FINGERS

## 2019-01-29 ASSESSMENT — REFRACTION_WEARINGRX
OD_SPHERE: BALANCE
SPECS_TYPE: SVL
OS_CYLINDER: +1.00
OS_AXIS: 087
OS_SPHERE: -3.00

## 2019-01-29 ASSESSMENT — EXTERNAL EXAM - RIGHT EYE: OD_EXAM: NORMAL

## 2019-01-29 ASSESSMENT — EXTERNAL EXAM - LEFT EYE: OS_EXAM: NORMAL

## 2019-01-29 ASSESSMENT — SLIT LAMP EXAM - LIDS
COMMENTS: NORMAL
COMMENTS: NORMAL

## 2019-01-29 NOTE — PROGRESS NOTES
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     Interval history    Vision stable. Denies eye pain, flashes, floaters.    POHx:  Granulomatosis polyangiitis/Marylin's  Corneal/Scleral Melts requiring patch grafts     Current Eye Medications:     Tacrolimus/Protopic 2 times daily PRN OD  Prednisolone 2-3 times per week--between protopic and pred gets something in around once a day  PFATs PRN OD: not using     Assessment & Plan:    1. H/o penetrating keratoplasty (PK), Right Eye: from Granulomatosis w/ Polyangiitis                - most recent flare up was October 2016 (prior to that was January 2014)              - episcleral/scleral inflammation resolved, stromal haze continues, area of epithelialized 70% corneal thinning present prior (small round inferonasal) stable (photos taken today)                - continue tacrolimus/Protopic BID              - continue prednisolone acetate 2-3x/week              - continue PFAT BID for comfort    2. Granulomatosis w/ polyangiitis: follows Rheumatology at the [facility]      - off systemic immunosuppression, continue f/u with rheum    3. Pseudophakia, Right Eye  Stable, monitor     Lachelle Castillo MD  PGY-5, Cornea Fellow      ~~~~~~~~~~~~~~~~~~~~~~~~~~~~~~~~~~~~~~~~~~~~~~~~~~~~~~~~~~~~~~~~    Complete documentation of historical and exam elements from today's encounter can be found in the full encounter summary report (not reduplicated in this progress note). I personally obtained the chief complaint(s) and history of present illness.  I confirmed and edited as necessary the review of systems, past medical/surgical history, family history, social history, and examination findings as documented by others.  I examined the patient myself, and I personally reviewed the relevant tests, images, and reports as documented above. I formulated  and edited as necessary the assessment and plan and discussed the findings and management plan with the patient and family.     Yovani Law MD, MA  Director, Cornea & Anterior Segment  [facility] Department of Ophthalmology & Visual Neuroscience

## 2019-01-29 NOTE — NURSING NOTE
Chief Complaints and History of Present Illnesses   Patient presents with     Follow Up     Chief Complaint(s) and History of Present Illness(es)     Follow Up     Laterality: both eyes    Onset: gradual    Onset: months ago    Quality: States va is the same since last visit      Frequency: constantly    Associated symptoms: Negative for dryness, redness and tearing    Treatments tried: artificial tears (occ)    Pain scale: 0/10              Comments     Wegener's granulomatosis   Melany Aniyah COT 7:32 AM January 29, 2019

## 2019-11-08 ENCOUNTER — HEALTH MAINTENANCE LETTER (OUTPATIENT)
Age: 49
End: 2019-11-08

## 2019-11-21 ENCOUNTER — TELEPHONE (OUTPATIENT)
Dept: RHEUMATOLOGY | Facility: CLINIC | Age: 49
End: 2019-11-21

## 2019-11-22 ENCOUNTER — OFFICE VISIT (OUTPATIENT)
Dept: RHEUMATOLOGY | Facility: CLINIC | Age: 49
End: 2019-11-22
Attending: INTERNAL MEDICINE
Payer: COMMERCIAL

## 2019-11-22 VITALS
OXYGEN SATURATION: 95 % | WEIGHT: 184.9 LBS | DIASTOLIC BLOOD PRESSURE: 78 MMHG | HEART RATE: 79 BPM | BODY MASS INDEX: 25.89 KG/M2 | TEMPERATURE: 97.9 F | SYSTOLIC BLOOD PRESSURE: 110 MMHG | HEIGHT: 71 IN

## 2019-11-22 DIAGNOSIS — I77.82 ANCA-ASSOCIATED VASCULITIS (H): ICD-10-CM

## 2019-11-22 DIAGNOSIS — Z23 NEED FOR PROPHYLACTIC VACCINATION AND INOCULATION AGAINST INFLUENZA: Primary | ICD-10-CM

## 2019-11-22 LAB
ALBUMIN UR-MCNC: NEGATIVE MG/DL
ALT SERPL W P-5'-P-CCNC: 30 U/L (ref 0–70)
APPEARANCE UR: CLEAR
AST SERPL W P-5'-P-CCNC: 22 U/L (ref 0–45)
BILIRUB UR QL STRIP: NEGATIVE
COLOR UR AUTO: YELLOW
CREAT SERPL-MCNC: 1.2 MG/DL (ref 0.66–1.25)
ERYTHROCYTE [DISTWIDTH] IN BLOOD BY AUTOMATED COUNT: 12.4 % (ref 10–15)
GFR SERPL CREATININE-BSD FRML MDRD: 71 ML/MIN/{1.73_M2}
GLUCOSE UR STRIP-MCNC: NEGATIVE MG/DL
HCT VFR BLD AUTO: 41.4 % (ref 40–53)
HGB BLD-MCNC: 14.2 G/DL (ref 13.3–17.7)
HGB UR QL STRIP: NEGATIVE
KETONES UR STRIP-MCNC: NEGATIVE MG/DL
LEUKOCYTE ESTERASE UR QL STRIP: NEGATIVE
MCH RBC QN AUTO: 31.4 PG (ref 26.5–33)
MCHC RBC AUTO-ENTMCNC: 34.3 G/DL (ref 31.5–36.5)
MCV RBC AUTO: 92 FL (ref 78–100)
MUCOUS THREADS #/AREA URNS LPF: PRESENT /LPF
NITRATE UR QL: NEGATIVE
PH UR STRIP: 5 PH (ref 5–7)
PLATELET # BLD AUTO: 434 10E9/L (ref 150–450)
RBC # BLD AUTO: 4.52 10E12/L (ref 4.4–5.9)
RBC #/AREA URNS AUTO: 1 /HPF (ref 0–2)
SOURCE: ABNORMAL
SP GR UR STRIP: 1.02 (ref 1–1.03)
UROBILINOGEN UR STRIP-MCNC: 0 MG/DL (ref 0–2)
WBC # BLD AUTO: 8.4 10E9/L (ref 4–11)
WBC #/AREA URNS AUTO: 1 /HPF (ref 0–5)

## 2019-11-22 PROCEDURE — 36415 COLL VENOUS BLD VENIPUNCTURE: CPT | Performed by: INTERNAL MEDICINE

## 2019-11-22 PROCEDURE — 25000581 ZZH RX MED A9270 GY (STAT IND- M) 250: Mod: ZF | Performed by: INTERNAL MEDICINE

## 2019-11-22 PROCEDURE — 90750 HZV VACC RECOMBINANT IM: CPT | Mod: ZF | Performed by: INTERNAL MEDICINE

## 2019-11-22 PROCEDURE — 90686 IIV4 VACC NO PRSV 0.5 ML IM: CPT | Mod: ZF | Performed by: INTERNAL MEDICINE

## 2019-11-22 PROCEDURE — G0008 ADMIN INFLUENZA VIRUS VAC: HCPCS | Mod: ZF

## 2019-11-22 PROCEDURE — 25000128 H RX IP 250 OP 636: Mod: ZF | Performed by: INTERNAL MEDICINE

## 2019-11-22 PROCEDURE — G0463 HOSPITAL OUTPT CLINIC VISIT: HCPCS | Mod: 25,ZF

## 2019-11-22 PROCEDURE — 85027 COMPLETE CBC AUTOMATED: CPT | Performed by: INTERNAL MEDICINE

## 2019-11-22 PROCEDURE — 90471 IMMUNIZATION ADMIN: CPT | Mod: ZF

## 2019-11-22 PROCEDURE — 81001 URINALYSIS AUTO W/SCOPE: CPT | Performed by: INTERNAL MEDICINE

## 2019-11-22 PROCEDURE — 84460 ALANINE AMINO (ALT) (SGPT): CPT | Performed by: INTERNAL MEDICINE

## 2019-11-22 PROCEDURE — 84450 TRANSFERASE (AST) (SGOT): CPT | Performed by: INTERNAL MEDICINE

## 2019-11-22 PROCEDURE — 90472 IMMUNIZATION ADMIN EACH ADD: CPT

## 2019-11-22 PROCEDURE — 82565 ASSAY OF CREATININE: CPT | Performed by: INTERNAL MEDICINE

## 2019-11-22 RX ADMIN — ZOSTER VACCINE RECOMBINANT, ADJUVANTED 0.5 ML: KIT at 08:08

## 2019-11-22 RX ADMIN — INFLUENZA A VIRUS A/BRISBANE/02/2018 IVR-190 (H1N1) ANTIGEN (FORMALDEHYDE INACTIVATED), INFLUENZA A VIRUS A/KANSAS/14/2017 X-327 (H3N2) ANTIGEN (FORMALDEHYDE INACTIVATED), INFLUENZA B VIRUS B/PHUKET/3073/2013 ANTIGEN (FORMALDEHYDE INACTIVATED), AND INFLUENZA B VIRUS B/MARYLAND/15/2016 BX-69A ANTIGEN (FORMALDEHYDE INACTIVATED) 0.5 ML: 15; 15; 15; 15 INJECTION, SUSPENSION INTRAMUSCULAR at 07:34

## 2019-11-22 ASSESSMENT — MIFFLIN-ST. JEOR: SCORE: 1735.83

## 2019-11-22 ASSESSMENT — PAIN SCALES - GENERAL: PAINLEVEL: NO PAIN (0)

## 2019-11-22 NOTE — PATIENT INSTRUCTIONS
Diagnosis:  1.  Limited Wegener's granulomatosis, quiescent    Plan:  1. Check blood work for kidney function, urine, liver functinon, blood counts.

## 2019-11-22 NOTE — LETTER
"2019    RE: Braxton Ayala  [address] [id number]     [facility]  Rheumatology Clinic  Quique Gould MD  2019    Name: Braxton Ayala  MRN: [id number]  Age: 49 year old  : 1970  Referring provider: [facility]    Assessment and Plan:  # Limited ANCA-associated vasculitis (R corneal \"melt\" , s/p 9 months of Cytoxan infusion completed , s/p maintenance imuran through 9-10. Corneal graft failure and repeat transplant in ; Rx MMF 1000 qd from  through  correlated with quiescence): Sustained eye symptoms or signs have not recurred since . He remains asymptomatic. Exam is unchanged. ANCA was low in 3-15 (negative since at least ), and creatinine was normal on 3-17, but blood work from 2018 showed a creatinine of 1.32 modestly increased from that noted in 2017; CBC was normal and urine was clear.    I think that ANCA-associated vasculitis remains in remission, but I will screen for activity with ANCA, Cr, UA, CBC, LFT. If systemic disease remains quiet, if local flares can be controlled with short bursts of prednisone and topical Rx, and if no further transplantation is contemplated, he may continue off systemic immunosuppression. Continue with Protopic (tacrolimus) treatment daily and steroid (dexamethasone) as needed, per Ophthalmology. Rituximab would be a good choice if graft-threatening eye disease recurs. Return to clinic in one year.    Orders:  - Creatinine  - ALT  - AST  - CBC with platelets  - Routine UA with Micro Reflex to Culture     Follow-up: Return in about 1 year (around 2020).    HPI:   Braxton Ayala is a 49 year old male with a history including limited Wegener's granulomatosis and ANCA-associated vasculitis who presents for follow-up. I last saw the patient on 2018, at which time the patient continued to remain stable off of systemic immunosuppression without new symptoms or symptom progression. The plan was to " proceed with watchful waiting.     Today, he reports that he is well. He states that he has lost some weight intentionally since our last visit and has also cut down on caffeine. He denies any symptoms reflecting skin rashes, spots or pain/swelling in the legs, joint pain, shortness of breath, or chest discomfort.    He states that his eye progress has been steady. He denies eye pain, eye redness, or changes in visual acuity. He reports that he has continued topical treatments with tacrolimus, mostly as needed. He explains that he has not used any over-the-counter antiinflammatories.    He reports that he was able to play basketball for the first time in a long time last night.     The patient saw Dr. Law in Ophthalmology on 01/29/2019 in follow up of corneal melt in polyangiitis. Absence of symptoms reflecting flare was noted. Continued immunosuppression with tacrolimus/protopic and prednisolone acetate was recommended.     Review of Systems:   Pertinent items are noted in HPI or as below, remainder of complete ROS is negative.      No recent problems with hearing or vision. No swallowing problems.   No breathing difficulty, shortness of breath, coughing, or wheezing.  No chest pain or palpitations.  No heart burn, indigestion, abdominal pain, nausea, vomiting, diarrhea.  No urination problems, no bloody, cloudy urine, no dysuria.  No numbing, tingling, weakness.  No headaches or confusion.  No rashes. No easy bleeding or bruising.     Active Medications:   Current Outpatient Medications:      lisinopril (PRINIVIL/ZESTRIL) 20 MG tablet, Take 20 mg by mouth, Disp: , Rfl:      prednisoLONE acetate (PRED FORTE) 1 % ophthalmic susp, Place 1 drop into the right eye every other day, Disp: 5 mL, Rfl: 6     PROTOPIC 0.1 % ointment, Apply topically daily in the right eye, Disp: 100 g, Rfl: 11  No current facility-administered medications for this visit.     Allergies:  No known drug allergies.     Past Medical  "History:  ANCA associated vasculitis, Limited WG presenting with corneal melt in 2008, s/p 9 months of cytoxan infusion finished in july 2008, started on Imuran 150mg/d (8/08) and prednisone was tapered off by the end of sept 2008, s/p corneal transplant graft R eye march 2009, developed Cataracts,s/p R eye cataracts surgery 3-10. Imuran tapered off 9-10. \"Rejection\" of graft 9-10 treated with oral prednisone and steroid drops. 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 New graft placed 4-11.  Hypertension  Nonsenile cataract  Marylin's disease  Corneal defect    Past Surgical History:  Right eye removal and replacement of suture  Corneal transplant, penetrating x2  Cataract IOL bilateral    Family History:    Family history reviewed. No pertinent family history.    Social History:  The patient reports that he has never smoked. He has never used smokeless tobacco. He reports current alcohol use. He reports that he does not use drugs.   [facility]  Marital Status:      Physical Exam:   /78   Pulse 79   Temp 97.9  F (36.6  C) (Oral)   Ht 1.803 m (5' 11\")   Wt 83.9 kg (184 lb 14.4 oz)   SpO2 95%   BMI 25.79 kg/m      Wt Readings from Last 4 Encounters:   11/22/19 83.9 kg (184 lb 14.4 oz)   09/14/18 86.2 kg (190 lb)   09/15/17 80.3 kg (177 lb)   03/15/17 82.1 kg (181 lb)     Constitutional: Well-developed, appearing stated age; cooperative.  Eyes: Normal EOM, PERRLA, vision, conjunctiva, sclera. Unchanged sclerosis in the right cornea with ovoid iris.  No spots or pain/swelling in the legs  ENT: Normal external ears, nose, hearing, lips, teeth, gums, throat. No mucous membrane lesions, normal saliva pool.  Neck: No mass or thyroid enlargement.  Resp: Lungs clear to auscultation, nl to palpation.  CV: RRR, no murmurs, rubs or gallops, no edema.  GI: No ABD mass or tenderness, no HSM.  : Not tested.  Lymph: No cervical, supraclavicular, inguinal or epitrochlear " nodes.  MS: The TMJ, neck, shoulder, elbow, wrist, MCP/PIP/DIP, spine, hip, knee, ankle, and foot MTP/IP joints were examined and found normal. No active synovitis or altered joint anatomy. Full joint ROM. Normal  strength. No dactylitis, tenosynovitis, enthesopathy.  Skin: No nail pitting, alopecia, rash, nodules or lesions.  Neuro: Normal cranial nerves, strength, sensation, DTRs.   Psych: Normal judgement, orientation, memory, affect.     Laboratory:   RHEUM RESULTS Latest Ref Rng & Units 9/15/2017 9/14/2018 11/22/2019   SED RATE 0 - 15 mm/h - - -   CRP, INFLAMMATION 0.0 - 8.0 mg/L <2.9 - -   AST 0 - 45 U/L - - 22   ALT 0 - 70 U/L - - 30   ALBUMIN 3.9 - 5.1 g/dL - - -   WBC 4.0 - 11.0 10e9/L 7.3 7.1 8.4   RBC 4.4 - 5.9 10e12/L 4.39(L) 4.62 4.52   HGB 13.3 - 17.7 g/dL 13.8 15.1 14.2   HCT 40.0 - 53.0 % 40.0 42.9 41.4   MCV 78 - 100 fl 91 93 92   MCHC 31.5 - 36.5 g/dL 34.5 35.2 34.3   RDW 10.0 - 15.0 % 12.3 12.0 12.4    - 450 10e9/L 334 347 434   CREATININE 0.66 - 1.25 mg/dL 1.18 1.32(H) 1.20   GFR ESTIMATE, IF BLACK >60 mL/min/[1.73:m2] 81 71 83   GFR ESTIMATE >60 mL/min/[1.73:m2] 67 58(L) 71    - 1620 mg/dL - - -     Neutrophil Cytoplasmic IgG Antibody   Date Value Ref Range Status   09/15/2017 <1:20 <1:20 Final     Comment:     (Note)  The ANCA IFA is <1:20; therefore, no further testing will   be performed.  INTERPRETIVE INFORMATION: Anti-Neutrophil Cyto Ab, IgG  Neutrophil Cytoplasmic Antibodies (C-ANCA = granular   cytoplasmic staining, P-ANCA = perinuclear staining) are   found in the serum of over 90 percent of patients with   certain necrotizing systemic vasculitides, and usually in   less than 5 percent of patients with collagen vascular   disease or arthritis.  Performed by [facility],  [address] [place] [id number] [phone]  [email or web address], Tramaine Allison MD, Lab. Director     IGG   Date Value Ref Range Status   06/24/2009 1220 695 - 1620 mg/dL Final     Scribe  Disclosure:  I, Vinay Camilo, am serving as a scribe to document services personally performed by Quique Gould MD at this visit, based upon the provider's statements to me. All documentation has been reviewed by the aforementioned provider prior to being entered into the official medical record.     Quique Gould MD

## 2019-11-22 NOTE — NURSING NOTE
"Chief Complaint   Patient presents with     RECHECK     ANCA-associated vasculitis      /78   Pulse 79   Temp 97.9  F (36.6  C) (Oral)   Ht 1.803 m (5' 11\")   Wt 83.9 kg (184 lb 14.4 oz)   SpO2 95%   BMI 25.79 kg/m    Ca Fry, NAYELI    "

## 2020-01-27 ENCOUNTER — NURSE TRIAGE (OUTPATIENT)
Dept: NURSING | Facility: CLINIC | Age: 50
End: 2020-01-27

## 2020-01-27 NOTE — TELEPHONE ENCOUNTER
"Warm transfer from call center.  Over the past week Braxton has had decreasing vision of right eye.  The changes have been escalating this past week.  Vision is cloudier, weaker, depth preception significantly changed.  This morning saw a \"white disc\" over the right pupil, he does not see this disc in his vision.  The right eye is warm and itchy, denies floaters, light flashes. Does admit to double vision, eye feels uncomfortable.  He knows much about what to watch for changes as started with cornea melt in 2007.  Best number to reach him at today for an appointment today is [phone]    Additional Information    Negative: Weakness of the face, arm or leg on one side of the body    Negative: Followed getting substance in the eye    Negative: Foreign body or object is or was lodged in the eye    Negative: Followed an eye injury    Negative: Followed sun lamp or sun exposure (UV keratitis)    Negative: Yellow or green discharge (pus) in the eye    Negative: Pregnant    Negative: Complete loss of vision in 1 or both eyes    Negative: Severe eye pain    Patient sounds very sick or weak to the triager    Protocols used: VISION LOSS OR CHANGE-A-OH      "

## 2020-01-28 ENCOUNTER — OFFICE VISIT (OUTPATIENT)
Dept: OPHTHALMOLOGY | Facility: CLINIC | Age: 50
End: 2020-01-28
Attending: OPHTHALMOLOGY
Payer: COMMERCIAL

## 2020-01-28 DIAGNOSIS — Z94.7 S/P PKP (PENETRATING KERATOPLASTY): ICD-10-CM

## 2020-01-28 DIAGNOSIS — H16.011 CENTRAL CORNEAL ULCER OF RIGHT EYE: ICD-10-CM

## 2020-01-28 DIAGNOSIS — M31.30 GRANULOMATOSIS WITH POLYANGIITIS, UNSPECIFIED WHETHER RENAL INVOLVEMENT (H): ICD-10-CM

## 2020-01-28 DIAGNOSIS — H16.011 CENTRAL CORNEAL ULCER OF RIGHT EYE: Primary | ICD-10-CM

## 2020-01-28 PROBLEM — I10 ESSENTIAL HYPERTENSION: Status: ACTIVE | Noted: 2017-07-28

## 2020-01-28 PROCEDURE — 92285 EXTERNAL OCULAR PHOTOGRAPHY: CPT | Mod: ZF | Performed by: OPHTHALMOLOGY

## 2020-01-28 PROCEDURE — 87186 SC STD MICRODIL/AGAR DIL: CPT | Performed by: STUDENT IN AN ORGANIZED HEALTH CARE EDUCATION/TRAINING PROGRAM

## 2020-01-28 PROCEDURE — G0463 HOSPITAL OUTPT CLINIC VISIT: HCPCS | Mod: ZF

## 2020-01-28 PROCEDURE — 87075 CULTR BACTERIA EXCEPT BLOOD: CPT | Performed by: STUDENT IN AN ORGANIZED HEALTH CARE EDUCATION/TRAINING PROGRAM

## 2020-01-28 PROCEDURE — 87102 FUNGUS ISOLATION CULTURE: CPT | Performed by: STUDENT IN AN ORGANIZED HEALTH CARE EDUCATION/TRAINING PROGRAM

## 2020-01-28 PROCEDURE — 65430 CORNEAL SMEAR: CPT | Mod: ZF | Performed by: OPHTHALMOLOGY

## 2020-01-28 PROCEDURE — 87070 CULTURE OTHR SPECIMN AEROBIC: CPT | Performed by: STUDENT IN AN ORGANIZED HEALTH CARE EDUCATION/TRAINING PROGRAM

## 2020-01-28 PROCEDURE — 87077 CULTURE AEROBIC IDENTIFY: CPT | Performed by: STUDENT IN AN ORGANIZED HEALTH CARE EDUCATION/TRAINING PROGRAM

## 2020-01-28 RX ORDER — OFLOXACIN 3 MG/ML
1-2 SOLUTION/ DROPS OPHTHALMIC
Qty: 1 BOTTLE | Refills: 11 | Status: SHIPPED | OUTPATIENT
Start: 2020-01-28 | End: 2020-03-06

## 2020-01-28 ASSESSMENT — CONF VISUAL FIELD
OS_NORMAL: 1
OD_INFERIOR_TEMPORAL_RESTRICTION: 3
OD_INFERIOR_NASAL_RESTRICTION: 3
METHOD: COUNTING FINGERS

## 2020-01-28 ASSESSMENT — REFRACTION_WEARINGRX
OS_AXIS: 087
OD_SPHERE: BALANCE
OS_CYLINDER: +1.00
SPECS_TYPE: SVL
OS_SPHERE: -3.00

## 2020-01-28 ASSESSMENT — TONOMETRY
OD_IOP_MMHG: 09
OS_IOP_MMHG: 11
IOP_METHOD: ICARE

## 2020-01-28 ASSESSMENT — VISUAL ACUITY
OD_SC: 20/250
CORRECTION_TYPE: GLASSES
METHOD: SNELLEN - LINEAR
OD_PH_SC: 20/125
OS_CC: 20/15

## 2020-01-28 ASSESSMENT — EXTERNAL EXAM - LEFT EYE: OS_EXAM: NORMAL

## 2020-01-28 ASSESSMENT — EXTERNAL EXAM - RIGHT EYE: OD_EXAM: NORMAL

## 2020-01-28 ASSESSMENT — SLIT LAMP EXAM - LIDS
COMMENTS: NORMAL
COMMENTS: NORMAL

## 2020-01-28 NOTE — PROGRESS NOTES
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     Interval history    Vision stable. Denies eye pain, flashes, floaters.    POHx:  Granulomatosis polyangiitis/Marylin's  Corneal/Scleral Melts requiring patch grafts     Current Eye Medications:   Tacrolimus/Protopic 2 times daily PRN OD  Prednisolone 2-3 times per week--between protopic and pred gets something in around once a day  PFATs PRN OD: not using     Assessment & Plan:    1. H/o penetrating keratoplasty (PK) (2008), Right Eye: from Granulomatosis w/ Polyangiitis              - most recent flare up was 2016 (prior to that was 2014)              - episcleral/scleral inflammation resolved, stromal haze continues, area of epithelialized 30-40% corneal thinning present prior (small round inferonasal) stable   - corneal ulcer with Haze and Epi defect today. Culture today, and get photos today.  GTTS:              - continue tacrolimus/Protopic BID              - continue prednisolone acetate 2-3x/week              - continue PFAT BID for comfort   - START Ofloxacin every 2 hours while awake.    2. Granulomatosis w/ polyangiitis: follows Rheumatology at the [facility]      - off systemic immunosuppression, continue f/u with rheum.    3. Pseudophakia, Right Eye  Stable, monitor     RTC Thursday or sooner if problems occur.    Attending Physician Attestation:  Complete documentation of historical and exam elements from today's encounter can be found in the full encounter summary report (not reduplicated in this progress note).  I personally obtained the chief complaint(s) and history of present illness.  I confirmed and edited as necessary the review of systems, past medical/surgical history, family history, social history, and examination findings as documented by others; and I examined the patient myself.  I  personally reviewed the relevant tests, images, and reports as documented above.  I formulated and edited as necessary the assessment and plan and discussed the findings and management plan with the patient and family. - Andriy Harmon MD

## 2020-01-29 ENCOUNTER — TELEPHONE (OUTPATIENT)
Dept: OPHTHALMOLOGY | Facility: CLINIC | Age: 50
End: 2020-01-29

## 2020-01-29 NOTE — TELEPHONE ENCOUNTER
Right eye cornea culture collected yesterday    Lab calling with results at 1005 1-    Light growth gram positive cocci     Note to Dr. Harmon/Aide    Will review with Dr. Noble in clinic today    Pt on ofloxacin and has f/u appt tomorrow      Juan Ledezma RN 10:09 AM 01/29/20

## 2020-01-30 ENCOUNTER — OFFICE VISIT (OUTPATIENT)
Dept: OPHTHALMOLOGY | Facility: CLINIC | Age: 50
End: 2020-01-30
Attending: OPHTHALMOLOGY
Payer: COMMERCIAL

## 2020-01-30 DIAGNOSIS — Z94.7 S/P PKP (PENETRATING KERATOPLASTY): ICD-10-CM

## 2020-01-30 DIAGNOSIS — H16.011 CENTRAL CORNEAL ULCER OF RIGHT EYE: Primary | ICD-10-CM

## 2020-01-30 DIAGNOSIS — M31.30 GRANULOMATOSIS WITH POLYANGIITIS, UNSPECIFIED WHETHER RENAL INVOLVEMENT (H): ICD-10-CM

## 2020-01-30 PROCEDURE — G0463 HOSPITAL OUTPT CLINIC VISIT: HCPCS | Mod: ZF

## 2020-01-30 RX ORDER — POLYMYXIN B SULFATE AND TRIMETHOPRIM 1; 10000 MG/ML; [USP'U]/ML
1-2 SOLUTION OPHTHALMIC
Qty: 1 BOTTLE | Refills: 11 | Status: SHIPPED | OUTPATIENT
Start: 2020-01-30 | End: 2020-03-06

## 2020-01-30 ASSESSMENT — EXTERNAL EXAM - RIGHT EYE: OD_EXAM: NORMAL

## 2020-01-30 ASSESSMENT — REFRACTION_WEARINGRX
OS_SPHERE: -3.00
SPECS_TYPE: SVL
OD_SPHERE: BALANCE
OS_AXIS: 087
OS_CYLINDER: +1.00

## 2020-01-30 ASSESSMENT — TONOMETRY
OD_IOP_MMHG: 09
IOP_METHOD: ICARE
OS_IOP_MMHG: 07

## 2020-01-30 ASSESSMENT — SLIT LAMP EXAM - LIDS
COMMENTS: NORMAL
COMMENTS: NORMAL

## 2020-01-30 ASSESSMENT — VISUAL ACUITY
OD_SC: 20/250
METHOD: SNELLEN - LINEAR
OD_PH_SC: 20/100
OS_CC: 20/15
CORRECTION_TYPE: GLASSES

## 2020-01-30 ASSESSMENT — CONF VISUAL FIELD
OS_NORMAL: 1
OD_INFERIOR_NASAL_RESTRICTION: 3

## 2020-01-30 ASSESSMENT — EXTERNAL EXAM - LEFT EYE: OS_EXAM: NORMAL

## 2020-01-30 NOTE — NURSING NOTE
Chief Complaints and History of Present Illnesses   Patient presents with     Corneal Ulcer Follow Up     Chief Complaint(s) and History of Present Illness(es)     Corneal Ulcer Follow Up     Laterality: right eye    Onset: 2 days ago    Associated symptoms: eye pain.  Negative for flashes, floaters and photophobia    Pain scale: 3/10              Comments     Pt here for 2 day f/u corneal ulcer of the right eye  No change in vision noted  Pt notes FBS/eye pain noted at 3 or 4 on the pain scale    Ocular meds:  Bacrolimus/Protopic BID right eye  Prednisolone acetate 2-3x/week right eye  PFAT BID for comfort right eye  Ofloxacin every 2 hours while awake - started it Tuesday at 3 PM    FARIDEH Malone 8:21 AM January 30, 2020

## 2020-01-30 NOTE — PROGRESS NOTES
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     Interval history    Vision stable. Denies eye pain, flashes, floaters.    POHx:  Granulomatosis polyangiitis/Marylin's  Corneal/Scleral Melts requiring patch grafts     Current Eye Medications:   Tacrolimus/Protopic 2 times daily PRN OD  Prednisolone 2-3 times per week--between protopic and pred gets something in around once a day  PFATs PRN OD: not using     Assessment & Plan:    1. H/o penetrating keratoplasty (PK) (2008), Right Eye: from Granulomatosis w/ Polyangiitis - Marylin's               - most recent flare up was 2016 (prior to that was 2014)              - episcleral/scleral inflammation resolved, stromal haze continues, area of epithelialized 30-40% corneal thinning present prior (small round inferonasal) stable  2. - corneal ulcer with Haze and Epi defect today. Culture +  (Light growth Gram positive cocci noted on 2020 from Micro lab) - no improvement    GTTS:              - continue tacrolimus/Protopic BID              - continue prednisolone acetate 2-3x/week              - continue PFAT BID for comfort   - Stop Ofloxacin every 2 hours while awake.   -Start Polytrim every 2 hours for better coverage.    2. Granulomatosis w/ polyangiitis: follows Rheumatology at the [facility]      - off systemic immunosuppression, continue f/u with rheum.    3. Pseudophakia, Right Eye  Stable, monitor     RTC Tuesday or sooner if problems occur.    Attending Physician Attestation:  Complete documentation of historical and exam elements from today's encounter can be found in the full encounter summary report (not reduplicated in this progress note).  I personally obtained the chief complaint(s) and history of present illness.  I confirmed and edited as necessary the review of systems, past medical/surgical history,  family history, social history, and examination findings as documented by others; and I examined the patient myself.  I personally reviewed the relevant tests, images, and reports as documented above.  I formulated and edited as necessary the assessment and plan and discussed the findings and management plan with the patient and family. - Andriy Harmon MD

## 2020-02-02 LAB
BACTERIA SPEC CULT: ABNORMAL
BACTERIA SPEC CULT: ABNORMAL
SPECIMEN SOURCE: ABNORMAL

## 2020-02-04 ENCOUNTER — OFFICE VISIT (OUTPATIENT)
Dept: OPHTHALMOLOGY | Facility: CLINIC | Age: 50
End: 2020-02-04
Attending: OPHTHALMOLOGY
Payer: COMMERCIAL

## 2020-02-04 DIAGNOSIS — H16.011 CENTRAL CORNEAL ULCER OF RIGHT EYE: Primary | ICD-10-CM

## 2020-02-04 DIAGNOSIS — Z94.7 S/P PKP (PENETRATING KERATOPLASTY): ICD-10-CM

## 2020-02-04 DIAGNOSIS — M31.30 GRANULOMATOSIS WITH POLYANGIITIS, UNSPECIFIED WHETHER RENAL INVOLVEMENT (H): ICD-10-CM

## 2020-02-04 LAB
BACTERIA SPEC CULT: NORMAL
Lab: NORMAL
SPECIMEN SOURCE: NORMAL

## 2020-02-04 PROCEDURE — G0463 HOSPITAL OUTPT CLINIC VISIT: HCPCS | Mod: ZF

## 2020-02-04 ASSESSMENT — VISUAL ACUITY
OS_CC: 20/20
METHOD: SNELLEN - LINEAR
CORRECTION_TYPE: GLASSES
OD_CC: 20/100

## 2020-02-04 ASSESSMENT — EXTERNAL EXAM - LEFT EYE: OS_EXAM: NORMAL

## 2020-02-04 ASSESSMENT — EXTERNAL EXAM - RIGHT EYE: OD_EXAM: NORMAL

## 2020-02-04 ASSESSMENT — SLIT LAMP EXAM - LIDS
COMMENTS: NORMAL
COMMENTS: NORMAL

## 2020-02-04 ASSESSMENT — TONOMETRY
IOP_METHOD: ICARE
OD_IOP_MMHG: 8
OS_IOP_MMHG: 10

## 2020-02-04 ASSESSMENT — CONF VISUAL FIELD
OS_NORMAL: 1
OD_NORMAL: 1

## 2020-02-04 NOTE — PROGRESS NOTES
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     Interval history: Vision improving. Noticing some irritation with eye drops.     POHx:  Granulomatosis polyangiitis/Marylin's  Corneal/Scleral Melts requiring patch grafts     Current Eye Medications:   Tacrolimus/Protopic 2 times daily PRN OD  Prednisolone 2-3 times per week--between protopic and pred gets something in around once a day  PFATs PRN OD: not using   Polytrim q2hrs while awake     Assessment & Plan:    1. H/o penetrating keratoplasty (PK) (2008), Right Eye: from Granulomatosis w/ Polyangiitis - Marylin's               - most recent flare up was 2016 (prior to that was 2014)              - episcleral/scleral inflammation resolved, stromal haze continues, area of epithelialized 30-40% corneal thinning present prior (small round inferonasal) stable    2. - corneal ulcer with less infiltrate (Haze) and NO Epi defect today. Culture +  (<15 colonies staph. epidermidis)    GTTS:              - continue tacrolimus/Protopic BID              - continue prednisolone acetate 2-3x/week              - continue PFAT BID for comfort   - decrease Polytrim to 6 times a day for 5 days, then 4 times a day.    3. Granulomatosis w/ polyangiitis: follows Rheumatology at the [facility]   - off systemic immunosuppression, continue f/u with rheum.    4. Pseudophakia, Right Eye   - Stable, monitor     RTC 1 week    Javon Mott MD  Ophthalmology Resident, PGY-3    Attending Physician Attestation:  Complete documentation of historical and exam elements from today's encounter can be found in the full encounter summary report (not reduplicated in this progress note).  I personally obtained the chief complaint(s) and history of present illness.  I confirmed and edited as necessary the review of systems, past medical/surgical  history, family history, social history, and examination findings as documented by others; and I examined the patient myself.  I personally reviewed the relevant tests, images, and reports as documented above.  I formulated and edited as necessary the assessment and plan and discussed the findings and management plan with the patient and family. - Andriy Harmon MD

## 2020-02-13 ENCOUNTER — OFFICE VISIT (OUTPATIENT)
Dept: OPHTHALMOLOGY | Facility: CLINIC | Age: 50
End: 2020-02-13
Attending: OPHTHALMOLOGY
Payer: COMMERCIAL

## 2020-02-13 DIAGNOSIS — Z94.7 S/P PKP (PENETRATING KERATOPLASTY): ICD-10-CM

## 2020-02-13 DIAGNOSIS — H16.011 CENTRAL CORNEAL ULCER OF RIGHT EYE: Primary | ICD-10-CM

## 2020-02-13 DIAGNOSIS — M31.30 GRANULOMATOSIS WITH POLYANGIITIS, UNSPECIFIED WHETHER RENAL INVOLVEMENT (H): ICD-10-CM

## 2020-02-13 DIAGNOSIS — Z96.1 PSEUDOPHAKIA OF RIGHT EYE: ICD-10-CM

## 2020-02-13 PROCEDURE — G0463 HOSPITAL OUTPT CLINIC VISIT: HCPCS | Mod: ZF

## 2020-02-13 ASSESSMENT — VISUAL ACUITY
METHOD: SNELLEN - LINEAR
CORRECTION_TYPE: GLASSES
OS_CC: 20/15
OD_SC: 20/100
OD_PH_SC: 20/50

## 2020-02-13 ASSESSMENT — REFRACTION_WEARINGRX
SPECS_TYPE: SVL
OS_AXIS: 087
OS_SPHERE: -3.00
OS_CYLINDER: +1.00
OD_SPHERE: BALANCE

## 2020-02-13 ASSESSMENT — TONOMETRY
OS_IOP_MMHG: 12
OD_IOP_MMHG: 08
IOP_METHOD: ICARE

## 2020-02-13 ASSESSMENT — CONF VISUAL FIELD
OS_NORMAL: 1
OD_NORMAL: 1

## 2020-02-13 NOTE — NURSING NOTE
Chief Complaints and History of Present Illnesses   Patient presents with     Corneal Ulcer Follow Up     Chief Complaint(s) and History of Present Illness(es)     Corneal Ulcer Follow Up     Laterality: right eye    Onset: 1 week ago              Comments     Pt. States that VA seems to have improved RE. No pain BE.   Felecia Lambert COT 2:57 PM February 13, 2020

## 2020-02-13 NOTE — PROGRESS NOTES
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     Interval history: Vision stable/improving. Noticing some irritation with eye drops. No redness, discharge. No flashes of light or floaters. No change in photophobia.     POHx:  Granulomatosis polyangiitis/Marylin's  Corneal/Scleral Melts requiring patch grafts     Gtts:   Tacrolimus/Protopic 2 times daily OD  Prednisolone daily OD  PFATs BID OD  Polytrim QID OD    Assessment & Plan:  # H/o penetrating keratoplasty (PK) (2008), Right Eye: from Granulomatosis w/ Polyangiitis - Marylin's               - most recent flare up was 2016 (prior to that was 2014)              - episcleral/scleral inflammation resolved, stromal haze continues, area of epithelialized 30-40% corneal thinning present prior (small round inferonasal) stable    # - corneal ulcer with less infiltrate (Haze) and NO Epi defect today. Culture +  (<15 colonies staph. epidermidis)    GTTS:              - continue tacrolimus/Protopic BID              - continue prednisolone acetate daily              - continue PFAT BID for comfort   - Decrease Polytrim qID right eye x 7 days, then stop    #. Granulomatosis w/ polyangiitis: follows Rheumatology at the [place]   - off systemic immunosuppression, continue f/u with rheum.    # Pseudophakia, Right Eye   - Stable, monitor     RTC 2-3 weeks    --  Sri Nix DO  PGY 5, Cornea Fellow  Ophthalmology    Attending Physician Attestation:  Complete documentation of historical and exam elements from today's encounter can be found in the full encounter summary report (not reduplicated in this progress note).  I personally obtained the chief complaint(s) and history of present illness.  I confirmed and edited as necessary the review of systems, past medical/surgical history, family history, social history, and  examination findings as documented by others; and I examined the patient myself.  I personally reviewed the relevant tests, images, and reports as documented above.  I formulated and edited as necessary the assessment and plan and discussed the findings and management plan with the patient and family. - Andriy Harmon MD

## 2020-02-25 LAB
FUNGUS SPEC CULT: NORMAL
SPECIMEN SOURCE: NORMAL

## 2020-03-06 ENCOUNTER — OFFICE VISIT (OUTPATIENT)
Dept: OPHTHALMOLOGY | Facility: CLINIC | Age: 50
End: 2020-03-06
Attending: OPHTHALMOLOGY
Payer: COMMERCIAL

## 2020-03-06 DIAGNOSIS — M31.30 GRANULOMATOSIS WITH POLYANGIITIS WITHOUT RENAL INVOLVEMENT (H): Primary | ICD-10-CM

## 2020-03-06 DIAGNOSIS — H17.9 CORNEAL SCAR AND OPACITY: ICD-10-CM

## 2020-03-06 PROCEDURE — G0463 HOSPITAL OUTPT CLINIC VISIT: HCPCS | Mod: ZF

## 2020-03-06 RX ORDER — CARBOXYMETHYLCELLULOSE SODIUM 5 MG/ML
1 SOLUTION/ DROPS OPHTHALMIC 3 TIMES DAILY PRN
COMMUNITY

## 2020-03-06 ASSESSMENT — VISUAL ACUITY
OD_SC+: +2
OS_CC+: -1
OD_SC: 20/50 ECC
OS_CC: 20/15
METHOD: SNELLEN - LINEAR

## 2020-03-06 ASSESSMENT — SLIT LAMP EXAM - LIDS
COMMENTS: NORMAL
COMMENTS: NORMAL

## 2020-03-06 ASSESSMENT — EXTERNAL EXAM - RIGHT EYE: OD_EXAM: NORMAL

## 2020-03-06 ASSESSMENT — REFRACTION_WEARINGRX
OS_SPHERE: -3.00
OS_AXIS: 087
OD_SPHERE: BALANCE
OS_CYLINDER: +1.00
SPECS_TYPE: SVL

## 2020-03-06 ASSESSMENT — CONF VISUAL FIELD
OD_SUPERIOR_TEMPORAL_RESTRICTION: 3
OD_SUPERIOR_NASAL_RESTRICTION: 3
OS_NORMAL: 1
METHOD: COUNTING FINGERS

## 2020-03-06 ASSESSMENT — EXTERNAL EXAM - LEFT EYE: OS_EXAM: NORMAL

## 2020-03-06 ASSESSMENT — TONOMETRY
OD_IOP_MMHG: 8
OS_IOP_MMHG: 11
IOP_METHOD: TONOPEN

## 2020-03-06 NOTE — PROGRESS NOTES
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     Interval history: Vision stable. No redness, discharge. No flashes of light or floaters. No change in photophobia. Pt states he is not usually using the pred/protopic regularly, especially since he has been using PFAT BID regularly.    POHx:  Granulomatosis polyangiitis/Marylin's  Corneal/Scleral Melts requiring patch grafts     Gtts:   Tacrolimus/Protopic 2 times daily OD  Prednisolone daily OD  PFATs BID OD      Assessment & Plan:  # H/o penetrating keratoplasty (PK) (2008), Right Eye: from Granulomatosis w/ Polyangiitis - Marylin's               - most recent flare up was 2016 (prior to that was 2014)              - episcleral/scleral inflammation resolved, stromal haze continues, area of epithelialized 30-40% corneal thinning present prior (small round inferonasal) stable    # - corneal ulcer with less infiltrate (Haze) and NO Epi defect today. Culture +  (<15 colonies staph. epidermidis)    GTTS:              - continue tacrolimus/Protopic BID              - continue prednisolone acetate daily or every other day   - (at minimum use protopic/pred once per week)              - continue PFAT BID for comfort    #. Granulomatosis w/ polyangiitis: follows Rheumatology at the [facility]   - off systemic immunosuppression, continue f/u with rheum.    # Pseudophakia, Right Eye   - Stable, monitor     RTC 3 months - call with any problems    --  Sri Nix,   PGY 5, Cornea Fellow  Ophthalmology    Attending Physician Attestation:  Complete documentation of historical and exam elements from today's encounter can be found in the full encounter summary report (not reduplicated in this progress note).  I personally obtained the chief complaint(s) and history of present illness.  I confirmed and edited as necessary the  review of systems, past medical/surgical history, family history, social history, and examination findings as documented by others; and I examined the patient myself.  I personally reviewed the relevant tests, images, and reports as documented above.  I formulated and edited as necessary the assessment and plan and discussed the findings and management plan with the patient and family. - Andriy Harmon MD

## 2020-03-06 NOTE — NURSING NOTE
"Chief Complaints and History of Present Illnesses   Patient presents with     Corneal Ulcer Follow Up     Chief Complaint(s) and History of Present Illness(es)     Corneal Ulcer Follow Up     Laterality: right eye    Onset: sudden    Severity: moderate    Associated symptoms: Negative for floaters, flashes, eye pain, discharge, itching and swelling    Response to treatment: mild improvement    Pain scale: 0/10              Comments     Braxton is here to continue care for a Central corneal ulcer of right eye - Right Eye. Since his last visit three weeks ago, he states vision RE is \"Okay\". He had an eye infection RE that slowed his progress.     Tevin White COT 7:35 AM March 6, 2020                   "

## 2020-11-19 NOTE — PROGRESS NOTES
"Patient was called and message left I would try back. Quique Gould MD on 2020 at 6:48 AM    Braxton Ayala is a 50 year old male who is being evaluated via a billable video visit.      The patient has been notified of following:     \"This video visit will be conducted via a call between you and your physician/provider. We have found that certain health care needs can be provided without the need for an in-person physical exam.  This service lets us provide the care you need with a video conversation.  If a prescription is necessary we can send it directly to your pharmacy.  If lab work is needed we can place an order for that and you can then stop by our lab to have the test done at a later time.    Video visits are billed at different rates depending on your insurance coverage.  Please reach out to your insurance provider with any questions.    If during the course of the call the physician/provider feels a video visit is not appropriate, you will not be charged for this service.\"    Patient has given verbal consent for Video visit?YES  How would you like to obtain your AVS? MyChart  If you are dropped from the video visit, the video invite should be resent to: [phone]  Will anyone else be joining your video visit? NO        Video-Visit Details    Type of service:  Video Visit    Video Start Time: 6:55 AM  Video End Time: 7:24 AM    Originating Location (pt. Location): Home    Distant Location (provider location):  [facility]OLOG[facility] [place]     Platform used for Video Visit: [facility]    Quique Gould MD        [facility]  Rheumatology Clinic-[facility]  Quique Gould MD  2020    Name: Braxton Ayala  MRN: [id number]  Age: 50 year old  : 1970  Referring provider: [facility]    Assessment and Plan:  # Limited ANCA-associated vasculitis (R corneal \"melt\" , s/p 9 months of Cytoxan infusion completed , s/p maintenance imuran through 9-10. Corneal graft " failure and repeat transplant in 4-11; Rx MMF 1000 qd from 9-12 through 12-13): Sustained eye symptoms or signs have not recurred since 1-14. He remains asymptomatic. Video exam is unchanged. ANCA was low in 3-15 (negative since at least 2010), and laboratory evaluation in November 2019 showed creatinine, transaminases, CBC, and urinalysis all negative or normal.    I think that ANCA-associated vasculitis remains in remission, but I will screen for activity with ANCA, Cr, UA, CBC, LFT. If systemic disease remains quiet, if local/ocular flares can be controlled with short bursts of prednisone and topical Rx, and if no further transplantation is contemplated, he may continue off systemic immunosuppression. Continue with Protopic (tacrolimus) treatment daily and steroid (dexamethasone) as needed, per Ophthalmology.    Rituximab would be a good choice if graft-threatening eye disease recurs.    Follow-up: Return in about 1 year (around 11/20/2021).    Quique Gould MD  Staff Rheumatologist, [facility]:   Braxton Ayala has a history including limited ANCA-associated vasculitis who presents for follow-up. I last saw the patient on 11-22-19, at which time the patient continued to remain stable off of systemic immunosuppression without new symptoms or symptom progression. The plan was to proceed with watchful waiting.    Interval history 11-:  In January 2020, he developed a corneal ulcer.  He was treated in ophthalmology with tacrolimus, prednisolone topical therapy.  Symptoms and signs resolved as of follow-up visit with ophthalmology in March 2020.    He tested positive for COVI19 on 10-30-20 after workplace exposure. He had fever, cough, congestion for a few days. He had marked fatigue for 3 days. Tested negative 4 days later. Still wheezing, but energy level has returned. Treated with aspirin only during illness.    He had flu shot on 10-10; he had marked fever shortly afterwards.    His R eye occasionally  "gets \"irritated\"--he uses steroid drops. No further episodes of eye irritation since 3-2020.    No chest, abdomen, skin, joint, nerve symptoms recently.  Prior hx :    Today, he reports that he is well. He states that he has lost some weight intentionally since our last visit and has also cut down on caffeine. He denies any symptoms reflecting skin rashes, spots or pain/swelling in the legs, joint pain, shortness of breath, or chest discomfort.    He states that his eye progress has been steady. He denies eye pain, eye redness, or changes in visual acuity. He reports that he has continued topical treatments with tacrolimus, mostly as needed. He explains that he has not used any over-the-counter antiinflammatories.    He reports that he was able to play basketball for the first time in a long time last night.     The patient saw Dr. Law in Ophthalmology on 01/29/2019 in follow up of corneal melt in polyangiitis. Absence of symptoms reflecting flare was noted. Continued immunosuppression with tacrolimus/protopic and prednisolone acetate was recommended.     Review of Systems:   Pertinent items are noted in HPI or as below, remainder of complete ROS is negative.      No recent problems with hearing or vision. No swallowing problems.   No breathing difficulty, shortness of breath, coughing, or wheezing.  No chest pain or palpitations.  No heart burn, indigestion, abdominal pain, nausea, vomiting, diarrhea.  No urination problems, no bloody, cloudy urine, no dysuria.  No numbing, tingling, weakness.  No headaches or confusion.  No rashes. No easy bleeding or bruising.     Active Medications:   Current Outpatient Medications:      lisinopril (PRINIVIL/ZESTRIL) 20 MG tablet, Take 20 mg by mouth, Disp: , Rfl:      prednisoLONE acetate (PRED FORTE) 1 % ophthalmic susp, Place 1 drop into the right eye every other day, Disp: 5 mL, Rfl: 6     PROTOPIC 0.1 % ointment, Apply topically daily in the right eye, Disp: 100 g, " "Rfl: 11  No current facility-administered medications for this visit.     Allergies:  No known drug allergies.     Past Medical History:  ANCA associated vasculitis, Limited WG presenting with corneal melt in 2008, s/p 9 months of cytoxan infusion finished in july 2008, started on Imuran 150mg/d (8/08) and prednisone was tapered off by the end of sept 2008, s/p corneal transplant graft R eye march 2009, developed Cataracts,s/p R eye cataracts surgery 3-10. Imuran tapered off 9-10. \"Rejection\" of graft 9-10 treated with oral prednisone and steroid drops. 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 New graft placed 4-11.  Hypertension  Nonsenile cataract  Marylin's disease  Corneal defect    Past Surgical History:  Right eye removal and replacement of suture  Corneal transplant, penetrating x2  Cataract IOL bilateral    Family History:    Family history reviewed. No pertinent family history.    Social History:  The patient reports that he has never smoked. He has never used smokeless tobacco. He reports current alcohol use. He reports that he does not use drugs.   PCP: Lucina, Darshan [place]  Marital Status:      Physical Exam:   There were no vitals taken for this visit.   Wt Readings from Last 4 Encounters:   11/22/19 83.9 kg (184 lb 14.4 oz)   09/14/18 86.2 kg (190 lb)   09/15/17 80.3 kg (177 lb)   03/15/17 82.1 kg (181 lb)     Constitutional: Well-developed, appearing stated age; cooperative.  Eyes: Normal EOM, PERRLA, vision, conjunctiva, sclera. Unchanged sclerosis in the right cornea with ovoid iris.  ENT: Normal external ears, nose, hearing, lips, teeth, gums, throat. No mucous membrane lesions.  Neck: No mass or thyroid enlargement.  Resp: breathing unlabored  Skin: No alopecia, rash, nodules or lesions.  Neuro: Normal cranial nerves  Psych: Normal judgement, orientation, memory, affect.     Laboratory:   RHEUM RESULTS Latest Ref Rng & Units 9/15/2017 9/14/2018 11/22/2019   SED RATE 0 - " 15 mm/h - - -   CRP, INFLAMMATION 0.0 - 8.0 mg/L <2.9 - -   AST 0 - 45 U/L - - 22   ALT 0 - 70 U/L - - 30   ALBUMIN 3.9 - 5.1 g/dL - - -   WBC 4.0 - 11.0 10e9/L 7.3 7.1 8.4   RBC 4.4 - 5.9 10e12/L 4.39(L) 4.62 4.52   HGB 13.3 - 17.7 g/dL 13.8 15.1 14.2   HCT 40.0 - 53.0 % 40.0 42.9 41.4   MCV 78 - 100 fl 91 93 92   MCHC 31.5 - 36.5 g/dL 34.5 35.2 34.3   RDW 10.0 - 15.0 % 12.3 12.0 12.4    - 450 10e9/L 334 347 434   CREATININE 0.66 - 1.25 mg/dL 1.18 1.32(H) 1.20   GFR ESTIMATE, IF BLACK >60 mL/min/[1.73:m2] 81 71 83   GFR ESTIMATE >60 mL/min/[1.73:m2] 67 58(L) 71    - 1620 mg/dL - - -     Neutrophil Cytoplasmic IgG Antibody   Date Value Ref Range Status   09/15/2017 <1:20 <1:20 Final     Comment:     (Note)  The ANCA IFA is <1:20; therefore, no further testing will   be performed.  INTERPRETIVE INFORMATION: Anti-Neutrophil Cyto Ab, IgG  Neutrophil Cytoplasmic Antibodies (C-ANCA = granular   cytoplasmic staining, P-ANCA = perinuclear staining) are   found in the serum of over 90 percent of patients with   certain necrotizing systemic vasculitides, and usually in   less than 5 percent of patients with collagen vascular   disease or arthritis.  Performed by [facility],  [address] [place] [id number] [phone]  [email or web address], Tramaine Allison MD, Lab. Director       IGG   Date Value Ref Range Status   06/24/2009 1220 695 - 1620 mg/dL Final

## 2020-11-20 ENCOUNTER — VIRTUAL VISIT (OUTPATIENT)
Dept: RHEUMATOLOGY | Facility: CLINIC | Age: 50
End: 2020-11-20
Attending: INTERNAL MEDICINE
Payer: COMMERCIAL

## 2020-11-20 DIAGNOSIS — I77.82 ANCA-ASSOCIATED VASCULITIS (H): Primary | ICD-10-CM

## 2020-11-20 PROCEDURE — 99213 OFFICE O/P EST LOW 20 MIN: CPT | Mod: GT | Performed by: INTERNAL MEDICINE

## 2020-11-20 NOTE — PATIENT INSTRUCTIONS
Diagnosis:  1.  COVID-19 with respiratory and constitutional symptoms, October 2020.  2.  History of limited ANCA associated vasculitis with corneal and ocular involvement: No recent symptoms that suggest recurrence of systemic inflammatory disease, apart from fever and cough that developed in the context of COVID-19 and of the flu shot.  No immune modulating medication is warranted.    Plan:  1.  Check ANCA, creatinine, liver function, CBC with differential, urinalysis.  2.  Monitor for secondary malignancies associated with prior cyclophosphamide use by monitoring for systemic symptoms, and annual urinalysis.  Obtain age-appropriate cancer screening (e.g. colonoscopy at age 50).

## 2020-11-20 NOTE — LETTER
"11/20/2020       RE: Braxton Ayala  [address] [facility] [id number]     Dear Colleague,    Thank you for referring your patient, Braxton Ayala, to the [facility] RHEUMATOLOGY CLINIC [place] [facility]. Please see a copy of my visit note below.    Patient was called and message left I would try back. Quique Gould MD on 11/20/2020 at 6:48 AM    Braxton Ayala is a 50 year old male who is being evaluated via a billable video visit.      The patient has been notified of following:     \"This video visit will be conducted via a call between you and your physician/provider. We have found that certain health care needs can be provided without the need for an in-person physical exam.  This service lets us provide the care you need with a video conversation.  If a prescription is necessary we can send it directly to your pharmacy.  If lab work is needed we can place an order for that and you can then stop by our lab to have the test done at a later time.    Video visits are billed at different rates depending on your insurance coverage.  Please reach out to your insurance provider with any questions.    If during the course of the call the physician/provider feels a video visit is not appropriate, you will not be charged for this service.\"    Patient has given verbal consent for Video visit?YES  How would you like to obtain your AVS? MyChart  If you are dropped from the video visit, the video invite should be resent to: [phone]  Will anyone else be joining your video visit? NO        Video-Visit Details    Type of service:  Video Visit    Video Start Time: 6:55 AM  Video End Time: 7:24 AM    Originating Location (pt. Location): Home    Distant Location (provider location):  [facility] RHEUMATOLOGY [facility]     Platform used for Video Visit: Davis Gould MD        [facility]  Rheumatology [facility]  Quique Gould, " "MD  2020    Name: Braxton Ayala  MRN: [id number]  Age: 50 year old  : 1970  Referring provider: [facility]    Assessment and Plan:  # Limited ANCA-associated vasculitis (R corneal \"melt\" , s/p 9 months of Cytoxan infusion completed , s/p maintenance imuran through 9-10. Corneal graft failure and repeat transplant in ; Rx MMF 1000 qd from  through ): Sustained eye symptoms or signs have not recurred since . He remains asymptomatic. Video exam is unchanged. ANCA was low in 3-15 (negative since at least ), and laboratory evaluation in 2019 showed creatinine, transaminases, CBC, and urinalysis all negative or normal.    I think that ANCA-associated vasculitis remains in remission, but I will screen for activity with ANCA, Cr, UA, CBC, LFT. If systemic disease remains quiet, if local/ocular flares can be controlled with short bursts of prednisone and topical Rx, and if no further transplantation is contemplated, he may continue off systemic immunosuppression. Continue with Protopic (tacrolimus) treatment daily and steroid (dexamethasone) as needed, per Ophthalmology.    Rituximab would be a good choice if graft-threatening eye disease recurs.    Follow-up: Return in about 1 year (around 2021).    Quique Gould MD  Staff Rheumatologist, [facility]      HPI:   Braxton Ayala has a history including limited ANCA-associated vasculitis who presents for follow-up. I last saw the patient on 19, at which time the patient continued to remain stable off of systemic immunosuppression without new symptoms or symptom progression. The plan was to proceed with watchful waiting.    Interval history 2020:  In 2020, he developed a corneal ulcer.  He was treated in ophthalmology with tacrolimus, prednisolone topical therapy.  Symptoms and signs resolved as of follow-up visit with ophthalmology in 2020.    He tested positive for COVI19 on 10-30-20 after " "workplace exposure. He had fever, cough, congestion for a few days. He had marked fatigue for 3 days. Tested negative 4 days later. Still wheezing, but energy level has returned. Treated with aspirin only during illness.    He had flu shot on 10-10; he had marked fever shortly afterwards.    His R eye occasionally gets \"irritated\"--he uses steroid drops. No further episodes of eye irritation since 3-2020.    No chest, abdomen, skin, joint, nerve symptoms recently.  Prior hx :    Today, he reports that he is well. He states that he has lost some weight intentionally since our last visit and has also cut down on caffeine. He denies any symptoms reflecting skin rashes, spots or pain/swelling in the legs, joint pain, shortness of breath, or chest discomfort.    He states that his eye progress has been steady. He denies eye pain, eye redness, or changes in visual acuity. He reports that he has continued topical treatments with tacrolimus, mostly as needed. He explains that he has not used any over-the-counter antiinflammatories.    He reports that he was able to play basketball for the first time in a long time last night.     The patient saw Dr. Law in Ophthalmology on 01/29/2019 in follow up of corneal melt in polyangiitis. Absence of symptoms reflecting flare was noted. Continued immunosuppression with tacrolimus/protopic and prednisolone acetate was recommended.     Review of Systems:   Pertinent items are noted in HPI or as below, remainder of complete ROS is negative.      No recent problems with hearing or vision. No swallowing problems.   No breathing difficulty, shortness of breath, coughing, or wheezing.  No chest pain or palpitations.  No heart burn, indigestion, abdominal pain, nausea, vomiting, diarrhea.  No urination problems, no bloody, cloudy urine, no dysuria.  No numbing, tingling, weakness.  No headaches or confusion.  No rashes. No easy bleeding or bruising.     Active Medications:   Current " "Outpatient Medications:      lisinopril (PRINIVIL/ZESTRIL) 20 MG tablet, Take 20 mg by mouth, Disp: , Rfl:      prednisoLONE acetate (PRED FORTE) 1 % ophthalmic susp, Place 1 drop into the right eye every other day, Disp: 5 mL, Rfl: 6     PROTOPIC 0.1 % ointment, Apply topically daily in the right eye, Disp: 100 g, Rfl: 11  No current facility-administered medications for this visit.     Allergies:  No known drug allergies.     Past Medical History:  ANCA associated vasculitis, Limited WG presenting with corneal melt in 2008, s/p 9 months of cytoxan infusion finished in july 2008, started on Imuran 150mg/d (8/08) and prednisone was tapered off by the end of sept 2008, s/p corneal transplant graft R eye march 2009, developed Cataracts,s/p R eye cataracts surgery 3-10. Imuran tapered off 9-10. \"Rejection\" of graft 9-10 treated with oral prednisone and steroid drops. 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 New graft placed 4-11.  Hypertension  Nonsenile cataract  Marylin's disease  Corneal defect    Past Surgical History:  Right eye removal and replacement of suture  Corneal transplant, penetrating x2  Cataract IOL bilateral    Family History:    Family history reviewed. No pertinent family history.    Social History:  The patient reports that he has never smoked. He has never used smokeless tobacco. He reports current alcohol use. He reports that he does not use drugs.   PCP: Lucina, Darshan Cristinabury  Marital Status:      Physical Exam:   There were no vitals taken for this visit.   Wt Readings from Last 4 Encounters:   11/22/19 83.9 kg (184 lb 14.4 oz)   09/14/18 86.2 kg (190 lb)   09/15/17 80.3 kg (177 lb)   03/15/17 82.1 kg (181 lb)     Constitutional: Well-developed, appearing stated age; cooperative.  Eyes: Normal EOM, PERRLA, vision, conjunctiva, sclera. Unchanged sclerosis in the right cornea with ovoid iris.  ENT: Normal external ears, nose, hearing, lips, teeth, gums, throat. No mucous " membrane lesions.  Neck: No mass or thyroid enlargement.  Resp: breathing unlabored  Skin: No alopecia, rash, nodules or lesions.  Neuro: Normal cranial nerves  Psych: Normal judgement, orientation, memory, affect.     Laboratory:   RHEUM RESULTS Latest Ref Rng & Units 9/15/2017 9/14/2018 11/22/2019   SED RATE 0 - 15 mm/h - - -   CRP, INFLAMMATION 0.0 - 8.0 mg/L <2.9 - -   AST 0 - 45 U/L - - 22   ALT 0 - 70 U/L - - 30   ALBUMIN 3.9 - 5.1 g/dL - - -   WBC 4.0 - 11.0 10e9/L 7.3 7.1 8.4   RBC 4.4 - 5.9 10e12/L 4.39(L) 4.62 4.52   HGB 13.3 - 17.7 g/dL 13.8 15.1 14.2   HCT 40.0 - 53.0 % 40.0 42.9 41.4   MCV 78 - 100 fl 91 93 92   MCHC 31.5 - 36.5 g/dL 34.5 35.2 34.3   RDW 10.0 - 15.0 % 12.3 12.0 12.4    - 450 10e9/L 334 347 434   CREATININE 0.66 - 1.25 mg/dL 1.18 1.32(H) 1.20   GFR ESTIMATE, IF BLACK >60 mL/min/[1.73:m2] 81 71 83   GFR ESTIMATE >60 mL/min/[1.73:m2] 67 58(L) 71    - 1620 mg/dL - - -     Neutrophil Cytoplasmic IgG Antibody   Date Value Ref Range Status   09/15/2017 <1:20 <1:20 Final     Comment:     (Note)  The ANCA IFA is <1:20; therefore, no further testing will   be performed.  INTERPRETIVE INFORMATION: Anti-Neutrophil Cyto Ab, IgG  Neutrophil Cytoplasmic Antibodies (C-ANCA = granular   cytoplasmic staining, P-ANCA = perinuclear staining) are   found in the serum of over 90 percent of patients with   certain necrotizing systemic vasculitides, and usually in   less than 5 percent of patients with collagen vascular   disease or arthritis.  Performed by [facility],  [address] [place] [id number] [phone]  [email or web address], Tramaine Allison MD, Lab. Director       IGG   Date Value Ref Range Status   06/24/2009 1220 695 - 1620 mg/dL Final           Again, thank you for allowing me to participate in the care of your patient.      Sincerely,    Quique Gould MD

## 2020-12-06 ENCOUNTER — HEALTH MAINTENANCE LETTER (OUTPATIENT)
Age: 50
End: 2020-12-06

## 2020-12-11 DIAGNOSIS — I77.82 ANCA-ASSOCIATED VASCULITIS (H): ICD-10-CM

## 2020-12-11 LAB
ALBUMIN SERPL-MCNC: 3.7 G/DL (ref 3.4–5)
ALBUMIN UR-MCNC: NEGATIVE MG/DL
ALP SERPL-CCNC: 75 U/L (ref 40–150)
ALT SERPL W P-5'-P-CCNC: 28 U/L (ref 0–70)
ANION GAP SERPL CALCULATED.3IONS-SCNC: 4 MMOL/L (ref 3–14)
APPEARANCE UR: CLEAR
AST SERPL W P-5'-P-CCNC: 15 U/L (ref 0–45)
BILIRUB SERPL-MCNC: 0.4 MG/DL (ref 0.2–1.3)
BILIRUB UR QL STRIP: NEGATIVE
BUN SERPL-MCNC: 15 MG/DL (ref 7–30)
CALCIUM SERPL-MCNC: 9 MG/DL (ref 8.5–10.1)
CHLORIDE SERPL-SCNC: 103 MMOL/L (ref 94–109)
CO2 SERPL-SCNC: 26 MMOL/L (ref 20–32)
COLOR UR AUTO: YELLOW
CREAT SERPL-MCNC: 1.23 MG/DL (ref 0.66–1.25)
ERYTHROCYTE [DISTWIDTH] IN BLOOD BY AUTOMATED COUNT: 12.4 % (ref 10–15)
GFR SERPL CREATININE-BSD FRML MDRD: 69 ML/MIN/{1.73_M2}
GLUCOSE SERPL-MCNC: 102 MG/DL (ref 70–99)
GLUCOSE UR STRIP-MCNC: NEGATIVE MG/DL
HCT VFR BLD AUTO: 43.3 % (ref 40–53)
HGB BLD-MCNC: 14.6 G/DL (ref 13.3–17.7)
HGB UR QL STRIP: NEGATIVE
KETONES UR STRIP-MCNC: NEGATIVE MG/DL
LEUKOCYTE ESTERASE UR QL STRIP: NEGATIVE
MCH RBC QN AUTO: 30 PG (ref 26.5–33)
MCHC RBC AUTO-ENTMCNC: 33.7 G/DL (ref 31.5–36.5)
MCV RBC AUTO: 89 FL (ref 78–100)
MUCOUS THREADS #/AREA URNS LPF: PRESENT /LPF
MYELOPEROXIDASE AB SER-ACNC: <0.2 AI (ref 0–0.9)
NITRATE UR QL: NEGATIVE
PH UR STRIP: 5 PH (ref 5–7)
PLATELET # BLD AUTO: 376 10E9/L (ref 150–450)
POTASSIUM SERPL-SCNC: 4.2 MMOL/L (ref 3.4–5.3)
PROT SERPL-MCNC: 7.7 G/DL (ref 6.8–8.8)
PROTEINASE3 IGG SER-ACNC: 0.6 AI (ref 0–0.9)
RBC # BLD AUTO: 4.87 10E12/L (ref 4.4–5.9)
RBC #/AREA URNS AUTO: <1 /HPF (ref 0–2)
SODIUM SERPL-SCNC: 134 MMOL/L (ref 133–144)
SOURCE: ABNORMAL
SP GR UR STRIP: 1.03 (ref 1–1.03)
UROBILINOGEN UR STRIP-MCNC: 0 MG/DL (ref 0–2)
WBC # BLD AUTO: 7.4 10E9/L (ref 4–11)
WBC #/AREA URNS AUTO: 1 /HPF (ref 0–5)

## 2020-12-11 PROCEDURE — 80053 COMPREHEN METABOLIC PANEL: CPT | Performed by: PATHOLOGY

## 2020-12-11 PROCEDURE — 83876 ASSAY MYELOPEROXIDASE: CPT | Mod: 90 | Performed by: PATHOLOGY

## 2020-12-11 PROCEDURE — 36415 COLL VENOUS BLD VENIPUNCTURE: CPT | Performed by: PATHOLOGY

## 2020-12-11 PROCEDURE — 83516 IMMUNOASSAY NONANTIBODY: CPT | Mod: 90 | Performed by: PATHOLOGY

## 2020-12-11 PROCEDURE — 85027 COMPLETE CBC AUTOMATED: CPT | Performed by: PATHOLOGY

## 2020-12-11 PROCEDURE — 81001 URINALYSIS AUTO W/SCOPE: CPT | Performed by: PATHOLOGY

## 2021-03-12 ENCOUNTER — OFFICE VISIT (OUTPATIENT)
Dept: OPHTHALMOLOGY | Facility: CLINIC | Age: 51
End: 2021-03-12
Attending: OPHTHALMOLOGY
Payer: COMMERCIAL

## 2021-03-12 DIAGNOSIS — H16.231 NEUROTROPHIC KERATOCONJUNCTIVITIS OF RIGHT EYE: ICD-10-CM

## 2021-03-12 DIAGNOSIS — Z96.1 PSEUDOPHAKIA OF RIGHT EYE: ICD-10-CM

## 2021-03-12 DIAGNOSIS — Z94.7 S/P PKP (PENETRATING KERATOPLASTY): Primary | ICD-10-CM

## 2021-03-12 DIAGNOSIS — H18.30 CORNEAL EPITHELIAL DEFECT: ICD-10-CM

## 2021-03-12 PROCEDURE — 99214 OFFICE O/P EST MOD 30 MIN: CPT | Mod: 25 | Performed by: OPHTHALMOLOGY

## 2021-03-12 PROCEDURE — G0463 HOSPITAL OUTPT CLINIC VISIT: HCPCS | Mod: 25

## 2021-03-12 PROCEDURE — 68761 CLOSE TEAR DUCT OPENING: CPT | Performed by: OPHTHALMOLOGY

## 2021-03-12 RX ORDER — ERYTHROMYCIN 5 MG/G
0.5 OINTMENT OPHTHALMIC
Qty: 3.5 G | Refills: 6 | Status: SHIPPED | OUTPATIENT
Start: 2021-03-12 | End: 2022-05-19

## 2021-03-12 RX ORDER — MOXIFLOXACIN 5 MG/ML
1 SOLUTION/ DROPS OPHTHALMIC 4 TIMES DAILY
Qty: 3 ML | Refills: 3 | Status: SHIPPED | OUTPATIENT
Start: 2021-03-12 | End: 2022-05-19

## 2021-03-12 ASSESSMENT — CONF VISUAL FIELD
OD_SUPERIOR_TEMPORAL_RESTRICTION: 3
OS_NORMAL: 1
OD_SUPERIOR_NASAL_RESTRICTION: 1
METHOD: COUNTING FINGERS

## 2021-03-12 ASSESSMENT — VISUAL ACUITY
OS_CC: 20/15
OD_SC: 20/80 ECC
CORRECTION_TYPE: GLASSES
METHOD: SNELLEN - LINEAR
OD_PH_SC: 20/50

## 2021-03-12 ASSESSMENT — SLIT LAMP EXAM - LIDS
COMMENTS: NORMAL
COMMENTS: NORMAL

## 2021-03-12 ASSESSMENT — EXTERNAL EXAM - RIGHT EYE: OD_EXAM: NORMAL

## 2021-03-12 ASSESSMENT — REFRACTION_WEARINGRX
OS_SPHERE: -3.00
OS_AXIS: 087
OS_CYLINDER: +1.00
SPECS_TYPE: SVL
OD_SPHERE: BALANCE

## 2021-03-12 ASSESSMENT — TONOMETRY
IOP_METHOD: ICARE
OS_IOP_MMHG: 14
OD_IOP_MMHG: 8

## 2021-03-12 ASSESSMENT — EXTERNAL EXAM - LEFT EYE: OS_EXAM: NORMAL

## 2021-03-12 NOTE — NURSING NOTE
"Chief Complaints and History of Present Illnesses   Patient presents with     Eye Problem Right Eye     Chief Complaint(s) and History of Present Illness(es)     Eye Problem Right Eye     Laterality: right eye    Quality: blurred vision    Associated symptoms: tearing and itching.  Negative for discharge    Treatments tried: eye drops              Comments     Pt complains of RE being itchy and irritated x 1 week.  Notes he looked in the mirror and \"it looks like his ulcer is back\"  Complains of mild tearing RE  Denies any significant vision changes RE since last visit.  Denies any discharge  Ocular meds: Prednisolone daily RE, Protopic every other day RE, & AT's PRN RE    Leyla Mehta OT 8:00 AM March 12, 2021                   "

## 2021-03-12 NOTE — PATIENT INSTRUCTIONS
1. BEGIN Vigamox (moxifloxacin), 4x/day to right eye    2. BEGIN erythromycin ointment, every 2 hours to right eye (always apply AFTER eye drops)    3. Use preservative free artificial tears for irritation    HOLD: Prednisolone and Protopic

## 2021-03-12 NOTE — PROGRESS NOTES
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     Interval history: 1 year follow-up. This morning, the eye feels warm and itchy for about the past few days- 1 week. Patient looked in mirror and thought the ulcer was back. No pain. Itchiness is the prominent symptom. Also irritated.     POHx:  Granulomatosis polyangiitis/Marylin's  Corneal/Scleral Melts requiring patch grafts     Per Dr. 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 If systemic disease remains quiet, if local/ocular flares can be controlled with short bursts of prednisone and topical Rx, and if no further transplantation is contemplated, he may continue off systemic immunosuppression. Rituximab would be a good choice if graft-threatening eye disease recurs.\"    Gtts:   Protopic ~QOD OD  Prednisolone ~daily OD  PFATs ~daily OD  Surgery:  - K wound revision (6/2012) ([facility])  - Repair of K perforation with corneoscleral patch graft right eye (1/2008) ([facility])  - Glued AMT due to leakage of K transplant wound 12/2007) ([facility])  - Initial PKP ~1990's per patient    Assessment & Plan:  # H/o penetrating keratoplasty (PK) (1/17/2008), Right Eye: from Granulomatosis w/ Polyangiitis - Marylin's               - most recent flare up was October 2016 (prior to that was January 2014)              - episcleral/scleral inflammation resolved, stromal haze continues, area of epithelialized 30-40% corneal thinning present prior (small round inferonasal) stable    # -Corneal ulcer with less infiltrate (Haze)- 1/2020  - Culture +  (<15 colonies staph. 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 Granulomatosis w/ polyangiitis: follows Rheumatology at the [facility]   - off systemic immunosuppression, continue f/u with rheum    # Pseudophakia, Right Eye   - Stable, monitor     RTC 1 wk with VT, SLP RE, call with problems.    Enedelia Sampson MD  Cornea & External Disease Fellow    Attending Physician Attestation:  Complete documentation of historical and exam elements from today's encounter can be found in the full encounter summary report (not reduplicated in this progress note).  I personally obtained the chief complaint(s) and history of present illness.  I confirmed and edited as necessary the review of systems, past medical/surgical history, family history, social history, and examination findings as documented by others; and I examined the patient myself.  I personally reviewed the relevant tests, images, and reports as documented above.  I formulated and edited as necessary the assessment and plan and discussed the findings and management plan with the patient and family. I was present for the key portions of the procedure and immediately available for the remainder. - Rigo Alcazar MD    I personally spent great than 40min with the patient, of which >50% of the time was spent face to face with the patient, counseling and coordinating care with the patient. This excludes time spent performing the procedure. We discussed the complexity of his diagnosis, the need for further information prior to proceeding with yet another surgery, and the unknown prognosis for the patient at this time.    Rigo Alcazar MD

## 2021-03-18 ENCOUNTER — OFFICE VISIT (OUTPATIENT)
Dept: OPHTHALMOLOGY | Facility: CLINIC | Age: 51
End: 2021-03-18
Attending: OPHTHALMOLOGY
Payer: COMMERCIAL

## 2021-03-18 DIAGNOSIS — H17.9 CORNEAL SCAR AND OPACITY: ICD-10-CM

## 2021-03-18 DIAGNOSIS — H18.30 CORNEAL EPITHELIAL DEFECT: Primary | ICD-10-CM

## 2021-03-18 DIAGNOSIS — H16.231 NEUROTROPHIC KERATOCONJUNCTIVITIS OF RIGHT EYE: ICD-10-CM

## 2021-03-18 DIAGNOSIS — Z96.1 PSEUDOPHAKIA OF RIGHT EYE: ICD-10-CM

## 2021-03-18 LAB
HSV1 DNA SPEC QL NAA+PROBE: NOT DETECTED
HSV2 DNA SPEC QL NAA+PROBE: NOT DETECTED
LABORATORY COMMENT REPORT: NORMAL
SPECIMEN SOURCE: NORMAL

## 2021-03-18 PROCEDURE — 87070 CULTURE OTHR SPECIMN AEROBIC: CPT | Performed by: OPHTHALMOLOGY

## 2021-03-18 PROCEDURE — 87075 CULTR BACTERIA EXCEPT BLOOD: CPT | Performed by: OPHTHALMOLOGY

## 2021-03-18 PROCEDURE — 99024 POSTOP FOLLOW-UP VISIT: CPT | Mod: GC | Performed by: OPHTHALMOLOGY

## 2021-03-18 PROCEDURE — 87102 FUNGUS ISOLATION CULTURE: CPT | Performed by: OPHTHALMOLOGY

## 2021-03-18 PROCEDURE — G0463 HOSPITAL OUTPT CLINIC VISIT: HCPCS

## 2021-03-18 PROCEDURE — 65430 CORNEAL SMEAR: CPT | Performed by: OPHTHALMOLOGY

## 2021-03-18 PROCEDURE — 87529 HSV DNA AMP PROBE: CPT | Performed by: OPHTHALMOLOGY

## 2021-03-18 ASSESSMENT — CONF VISUAL FIELD
OD_SUPERIOR_TEMPORAL_RESTRICTION: 3
METHOD: COUNTING FINGERS
OD_SUPERIOR_NASAL_RESTRICTION: 3
OD_INFERIOR_TEMPORAL_RESTRICTION: 3
OS_NORMAL: 1

## 2021-03-18 ASSESSMENT — REFRACTION_WEARINGRX
OS_CYLINDER: +1.00
SPECS_TYPE: SVL
OS_AXIS: 087
OD_SPHERE: BALANCE
OS_SPHERE: -3.00

## 2021-03-18 ASSESSMENT — EXTERNAL EXAM - RIGHT EYE: OD_EXAM: NORMAL

## 2021-03-18 ASSESSMENT — SLIT LAMP EXAM - LIDS
COMMENTS: NORMAL
COMMENTS: NORMAL

## 2021-03-18 ASSESSMENT — VISUAL ACUITY
METHOD: SNELLEN - LINEAR
OD_SC: 20/200
OD_PH_SC: 20/60
OS_CC: 20/15

## 2021-03-18 ASSESSMENT — TONOMETRY
IOP_METHOD: ICARE
OS_IOP_MMHG: 15
OD_IOP_MMHG: 7

## 2021-03-18 ASSESSMENT — EXTERNAL EXAM - LEFT EYE: OS_EXAM: NORMAL

## 2021-03-18 NOTE — NURSING NOTE
Chief Complaints and History of Present Illnesses   Patient presents with     Follow Up     Chief Complaint(s) and History of Present Illness(es)     Follow Up     Laterality: right eye    Associated symptoms: floaters and itching.  Negative for eye pain, flashes and discharge    Pain scale: 0/10              Comments     Braxton is here to continue care for S/P PKP (penetrating keratoplasty) - Right Eye.  He states RE feels better but still some irritation     Tevin White COT 7:42 AM March 18, 2021

## 2021-03-18 NOTE — PROGRESS NOTES
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     Interval history:   Follow-up for new epidefect 3/12/21. Doing Vigamox QID and EE miles q2h.     1 year follow-up. This morning, the eye feels warm and itchy for about the past few days- 1 week. Patient looked in mirror and thought the ulcer was back. No pain. Itchiness is the prominent symptom. Also irritated.     POHx:  Granulomatosis polyangiitis/Marylin's  Corneal/Scleral Melts requiring patch grafts     Per Dr. 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 If systemic disease remains quiet, if local/ocular flares can be controlled with short bursts of prednisone and topical Rx, and if no further transplantation is contemplated, he may continue off systemic immunosuppression. Rituximab would be a good choice if graft-threatening eye disease recurs.\"    Gtts:    held on 3/12 for epidefect  Prednisolone ~daily OD  PFATs ~daily right eye  Vigamox QID right eye   EE miles Q2H right eye   Surgery:  - K wound revision (6/2012) ([facility])  - Repair of K perforation with corneoscleral patch graft right eye (1/2008) ([facility])  - Glued AMT due to leakage of K transplant wound 12/2007) ([facility])  - Initial PKP ~1990's per patient    Assessment & Plan:  # H/o penetrating keratoplasty (PK) (1/17/2008), Right Eye: from Granulomatosis w/ Polyangiitis - Marylin's               - most recent flare up was October 2016 (prior to that was January 2014)              - episcleral/scleral inflammation resolved, stromal haze continues, area of epithelialized 30-40% corneal thinning present prior (small round inferonasal) stable      # 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  - Noted   - sensation intact but reduced right eye (checked 3/12/21)  - RLL punctal plug today 3/12/21    Plan:   - Corneal culture today              - Increase Vigamox Q2h   - Continue emycin miles q2h    - use Protopic QHS              - Hold prednisolone acetate daily    #. Granulomatosis w/ polyangiitis: follows Rheumatology at the [facility]   - off systemic immunosuppression, continue f/u with rheum    # Pseudophakia, Right Eye   - Stable, monitor     Pt out of town during early next week (call if sx worsen.    RTC 1 wk with VT, SLP RE, call with problems.      Sheila Perez MD  Ophthalmology PGY-3  [facility]    Attending Physician Attestation:  Complete documentation of historical and exam elements from today's encounter can be found in the full encounter summary report (not reduplicated in this progress note).  I personally obtained the chief complaint(s) and history of present illness.  I confirmed and edited as necessary the review of systems, past medical/surgical history, family history, social history, and examination findings as documented by others; and I examined the patient myself.  I personally reviewed the relevant tests, images, and reports as documented above.  I formulated and edited as necessary the assessment and plan and discussed the findings and management plan with the patient and family. - Andriy Harmon MD     I was present for the entire procedure(s). - Andriy Harmon MD

## 2021-03-25 ENCOUNTER — OFFICE VISIT (OUTPATIENT)
Dept: OPHTHALMOLOGY | Facility: CLINIC | Age: 51
End: 2021-03-25
Attending: OPHTHALMOLOGY
Payer: COMMERCIAL

## 2021-03-25 DIAGNOSIS — H17.9 CORNEAL SCAR AND OPACITY: ICD-10-CM

## 2021-03-25 DIAGNOSIS — H18.891 PERSISTENT EPITHELIAL DEFECT OF RIGHT CORNEA: Primary | ICD-10-CM

## 2021-03-25 DIAGNOSIS — Z94.7 S/P PKP (PENETRATING KERATOPLASTY): ICD-10-CM

## 2021-03-25 DIAGNOSIS — H16.231 NEUROTROPHIC KERATOCONJUNCTIVITIS OF RIGHT EYE: ICD-10-CM

## 2021-03-25 DIAGNOSIS — H18.30 CORNEAL EPITHELIAL DEFECT: ICD-10-CM

## 2021-03-25 LAB
BACTERIA SPEC CULT: NO GROWTH
BACTERIA SPEC CULT: NORMAL
Lab: NORMAL
SPECIMEN SOURCE: NORMAL
SPECIMEN SOURCE: NORMAL

## 2021-03-25 PROCEDURE — G0463 HOSPITAL OUTPT CLINIC VISIT: HCPCS

## 2021-03-25 PROCEDURE — 99214 OFFICE O/P EST MOD 30 MIN: CPT | Mod: 25 | Performed by: OPHTHALMOLOGY

## 2021-03-25 PROCEDURE — 65778 COVER EYE W/MEMBRANE: CPT | Performed by: OPHTHALMOLOGY

## 2021-03-25 ASSESSMENT — SLIT LAMP EXAM - LIDS
COMMENTS: NORMAL
COMMENTS: NORMAL

## 2021-03-25 ASSESSMENT — VISUAL ACUITY
CORRECTION_TYPE: GLASSES
OD_PH_SC+: -2
OS_CC+: -1
OS_CC: 20/15
OD_PH_SC: 20/40
OD_SC: 20/200
METHOD: SNELLEN - LINEAR

## 2021-03-25 ASSESSMENT — EXTERNAL EXAM - LEFT EYE: OS_EXAM: NORMAL

## 2021-03-25 ASSESSMENT — CONF VISUAL FIELD
OS_NORMAL: 1
OD_SUPERIOR_NASAL_RESTRICTION: 3
METHOD: COUNTING FINGERS
OD_SUPERIOR_TEMPORAL_RESTRICTION: 3

## 2021-03-25 ASSESSMENT — EXTERNAL EXAM - RIGHT EYE: OD_EXAM: NORMAL

## 2021-03-25 ASSESSMENT — REFRACTION_WEARINGRX
SPECS_TYPE: SVL
OS_SPHERE: -3.00
OS_AXIS: 087
OS_CYLINDER: +1.00
OD_SPHERE: BALANCE

## 2021-03-25 ASSESSMENT — TONOMETRY
IOP_METHOD: ICARE
OS_IOP_MMHG: 12
OD_IOP_MMHG: 8

## 2021-03-25 NOTE — NURSING NOTE
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  Complains of occasional tearing RE.  Denies any pain, irritation, and discharge.  Ocular meds: Vigamox q2h RE, Erythromycin miles q2h RE, & Protopic at bedtime RE    Leyla Mehta OT 7:45 AM March 25, 2021

## 2021-03-25 NOTE — PROGRESS NOTES
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     Interval history:   Follow-up for new epidefect 3/12/21. Doing Vigamox Q2Hand EE miles q2h. Denying pain, no discomfort. Vision stable.      POHx:  Granulomatosis polyangiitis/Marylin's  Corneal/Scleral Melts requiring patch grafts     Per Dr. 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 If systemic disease remains quiet, if local/ocular flares can be controlled with short bursts of prednisone and topical Rx, and if no further transplantation is contemplated, he may continue off systemic immunosuppression. 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  - sensation intact but reduced right eye (checked 3/12/21)  - RLL punctal plug 3/12/21  - Cultures NGTD (Aerobic, anerobic, fungal, HSV)  - Still with residual epithelial defect nasally mildly improved, underlying stromal scarring    Plan:              - dec Vigamox to QID   - Hold emycin miles q2h    - use Protopic QHS              - prednisolone acetate daily    # Prokera ring right eye today with tegaderm    # H/o penetrating keratoplasty (PK) (1/17/2008), Right Eye: from Granulomatosis w/ Polyangiitis - Marylin's "               - most recent flare up was October 2016 (prior to that was January 2014)              - episcleral/scleral inflammation resolved, stromal haze continues, area of epithelialized 30-40% corneal thinning present prior (small round inferonasal) stable      #. Granulomatosis w/ polyangiitis: follows Rheumatology at the [facility]   - off systemic immunosuppression, continue f/u with rheum    # Pseudophakia, Right Eye   - Stable, monitor .    RTC 1 wk with VT, SLP RE, call with problems.      Sheila Perez MD  Ophthalmology PGY-3    Attending Physician Attestation:  Complete documentation of historical and exam elements from today's encounter can be found in the full encounter summary report (not reduplicated in this progress note).  I personally obtained the chief complaint(s) and history of present illness.  I confirmed and edited as necessary the review of systems, past medical/surgical history, family history, social history, and examination findings as documented by others; and I examined the patient myself.  I personally reviewed the relevant tests, images, and reports as documented above.  I formulated and edited as necessary the assessment and plan and discussed the findings and management plan with the patient and family. - Andriy Harmon MD   [facility]

## 2021-03-28 ENCOUNTER — TELEPHONE (OUTPATIENT)
Dept: OPHTHALMOLOGY | Facility: CLINIC | Age: 51
End: 2021-03-28

## 2021-03-28 NOTE — TELEPHONE ENCOUNTER
Patient phoned today with symptoms or eye redness since this morning and white discharge since yesterday.     History of GPA with right neurotrohic keratopathy and new epithelial defect with placement of prokera ring in clinic on 3/25. Discussed with Dr. Sampson, recommendation for continuation of current eye drop regimen and in addition adding frequent preservative free artificial teares every 1-2 hours, restarting erythromycin ointment QID, and using tegaderm to tape lateral eyelids together to help keep prokera ring in place. Patient was in agreement. Advised to phone on Monday 3/29 if symptoms persistent/worsening to move up follow up apt from Thursday to Tuesday with Dr. Harmon.    Rahel Marie MD  Ophthalmology Resident, PGY-2  [facility]

## 2021-03-30 ENCOUNTER — OFFICE VISIT (OUTPATIENT)
Dept: OPHTHALMOLOGY | Facility: CLINIC | Age: 51
End: 2021-03-30
Attending: OPHTHALMOLOGY
Payer: COMMERCIAL

## 2021-03-30 DIAGNOSIS — Z94.7 S/P PKP (PENETRATING KERATOPLASTY): ICD-10-CM

## 2021-03-30 DIAGNOSIS — M31.30 GRANULOMATOSIS WITH POLYANGIITIS WITHOUT RENAL INVOLVEMENT (H): ICD-10-CM

## 2021-03-30 DIAGNOSIS — H17.9 CORNEAL SCAR AND OPACITY: ICD-10-CM

## 2021-03-30 DIAGNOSIS — H16.231 NEUROTROPHIC KERATOCONJUNCTIVITIS OF RIGHT EYE: Primary | ICD-10-CM

## 2021-03-30 PROCEDURE — G0463 HOSPITAL OUTPT CLINIC VISIT: HCPCS

## 2021-03-30 PROCEDURE — 99214 OFFICE O/P EST MOD 30 MIN: CPT | Mod: GC | Performed by: OPHTHALMOLOGY

## 2021-03-30 RX ORDER — ACETAMINOPHEN 325 MG/1
325-650 TABLET ORAL EVERY 6 HOURS PRN
COMMUNITY

## 2021-03-30 RX ORDER — IBUPROFEN 200 MG
200 TABLET ORAL EVERY 4 HOURS PRN
COMMUNITY
End: 2023-09-22

## 2021-03-30 ASSESSMENT — VISUAL ACUITY
OD_SC: 20/400
OS_CC: 20/15
METHOD: SNELLEN - LINEAR
OD_PH_SC: 20/100
CORRECTION_TYPE: GLASSES

## 2021-03-30 ASSESSMENT — TONOMETRY
OD_IOP_MMHG: 11
OS_IOP_MMHG: 19
IOP_METHOD: ICARE

## 2021-03-30 ASSESSMENT — CONF VISUAL FIELD: OD_SUPERIOR_NASAL_RESTRICTION: 3

## 2021-03-30 ASSESSMENT — EXTERNAL EXAM - RIGHT EYE: OD_EXAM: NORMAL

## 2021-03-30 ASSESSMENT — EXTERNAL EXAM - LEFT EYE: OS_EXAM: NORMAL

## 2021-03-30 ASSESSMENT — SLIT LAMP EXAM - LIDS
COMMENTS: NORMAL
COMMENTS: NORMAL

## 2021-03-30 NOTE — PROGRESS NOTES
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     Interval history:   Follow-up for new epidefect 3/12/21. Prokera placed 3/25/21. Patient states eye has been red and uncomfortable with some matting and mucoid discharge in the AM. Using frequent PFAT, Vigamox QID. Vision stable.      POHx:  Granulomatosis polyangiitis/Marylin's  Corneal/Scleral Melts requiring patch grafts     Per Dr. 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 If systemic disease remains quiet, if local/ocular flares can be controlled with short bursts of prednisone and topical Rx, and if no further transplantation is contemplated, he may continue off systemic immunosuppression. 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  - sensation intact but reduced right eye (checked 3/12/21)  - RLL punctal plug 3/12/21  - Cultures NGTD (Aerobic, anerobic, fungal, HSV)  - Prokera ring placed 3/25/21  - Resolved epithelial defect, underlying stromal scarring. Prokera ring removed.     Plan:              - Continue PFATS Celluvisc 4-6x/day (or more) or similar   - Continue Protopic QHS              - Continue prednisolone acetate daily    # H/o penetrating keratoplasty (PK) " (1/17/2008), Right Eye: from Granulomatosis w/ Polyangiitis - Marylin's               - most recent flare up was October 2016 (prior to that was January 2014)              - episcleral/scleral inflammation resolved, stromal haze continues, area of epithelialized 30-40% corneal thinning present prior (small round inferonasal) stable      #. Granulomatosis w/ polyangiitis: follows Rheumatology at the [facility]   - off systemic immunosuppression, continue f/u with rheum    # Pseudophakia, Right Eye   - Stable, monitor .    RTC 1 month VT       Sheila Perez MD  Ophthalmology PGY-3    Attending Physician Attestation:  Complete documentation of historical and exam elements from today's encounter can be found in the full encounter summary report (not reduplicated in this progress note).  I personally obtained the chief complaint(s) and history of present illness.  I confirmed and edited as necessary the review of systems, past medical/surgical history, family history, social history, and examination findings as documented by others; and I examined the patient myself.  I personally reviewed the relevant tests, images, and reports as documented above.  I formulated and edited as necessary the assessment and plan and discussed the findings and management plan with the patient and family. - Andriy Harmon MD

## 2021-03-30 NOTE — NURSING NOTE
Chief Complaints and History of Present Illnesses   Patient presents with     Follow Up     Persistent epithelial defect of right cornea     Chief Complaint(s) and History of Present Illness(es)     Follow Up     Laterality: right eye    Associated symptoms: redness, eye pain, discharge and itching    Treatments tried: eye drops, ointment and artificial tears    Pain scale: 5/10    Comments: Persistent epithelial defect of right cornea              Comments     He had a Prokera ring placed in his right eye on Thursday morning.   The right eye has had a foreign body sensation since then.  The eye has felt irritated.    He is using:  Protopic ointment at night  Prednisolone ~daily right eye   PFATs ~1-2 hours right eye  Vigamox QID right eye     RHODA Buitrago 8:09 AM  March 30, 2021

## 2021-04-15 LAB
FUNGUS SPEC CULT: NORMAL
SPECIMEN SOURCE: NORMAL

## 2021-05-06 ENCOUNTER — OFFICE VISIT (OUTPATIENT)
Dept: OPHTHALMOLOGY | Facility: CLINIC | Age: 51
End: 2021-05-06
Attending: OPHTHALMOLOGY
Payer: COMMERCIAL

## 2021-05-06 DIAGNOSIS — Z94.7 HISTORY OF PENETRATING KERATOPLASTY: ICD-10-CM

## 2021-05-06 DIAGNOSIS — M31.30 GRANULOMATOSIS WITH POLYANGIITIS WITHOUT RENAL INVOLVEMENT (H): Primary | ICD-10-CM

## 2021-05-06 DIAGNOSIS — H18.891 PERSISTENT EPITHELIAL DEFECT OF RIGHT CORNEA: ICD-10-CM

## 2021-05-06 DIAGNOSIS — H17.9 CORNEAL SCAR AND OPACITY: ICD-10-CM

## 2021-05-06 PROCEDURE — G0463 HOSPITAL OUTPT CLINIC VISIT: HCPCS

## 2021-05-06 PROCEDURE — 99214 OFFICE O/P EST MOD 30 MIN: CPT | Mod: GC | Performed by: OPHTHALMOLOGY

## 2021-05-06 RX ORDER — PREDNISOLONE ACETATE 10 MG/ML
1 SUSPENSION/ DROPS OPHTHALMIC DAILY
Qty: 15 ML | Refills: 11 | Status: SHIPPED | OUTPATIENT
Start: 2021-05-06 | End: 2022-07-25

## 2021-05-06 ASSESSMENT — VISUAL ACUITY
CORRECTION_TYPE: GLASSES
OD_CC+: -1+2
OD_CC: 20/60
METHOD: SNELLEN - LINEAR
OS_CC+: +
OD_PH_CC: 20/30
OS_CC: 20/15

## 2021-05-06 ASSESSMENT — TONOMETRY
IOP_METHOD: ICARE
OS_IOP_MMHG: 11
OD_IOP_MMHG: 9

## 2021-05-06 ASSESSMENT — SLIT LAMP EXAM - LIDS
COMMENTS: NORMAL
COMMENTS: NORMAL

## 2021-05-06 ASSESSMENT — REFRACTION_WEARINGRX
OS_AXIS: 087
OS_SPHERE: -3.00
OD_SPHERE: BALANCE
OS_CYLINDER: +1.00
SPECS_TYPE: SVL

## 2021-05-06 ASSESSMENT — CONF VISUAL FIELD
OD_NORMAL: 1
METHOD: COUNTING FINGERS
OS_NORMAL: 1

## 2021-05-06 ASSESSMENT — EXTERNAL EXAM - RIGHT EYE: OD_EXAM: NORMAL

## 2021-05-06 ASSESSMENT — EXTERNAL EXAM - LEFT EYE: OS_EXAM: NORMAL

## 2021-05-06 NOTE — PATIENT INSTRUCTIONS
Continue prednisolone 1x/day  Continue Protoptic and/or erythromycin at bedtime.  Continue Refresh Celluvisc 4-6x/day

## 2021-05-06 NOTE — PROGRESS NOTES
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     Interval history:   Follow-up for epi defect noted 3/12/21. Vision is BEST IT HAS BEEN in a long time. Vision does tend to fluctuate. As vision improved, has mildly worsening diplopia - not debilitating. No new flashes, floaters, or diplopia. No pain, redness, or tearing.     POHx:  Granulomatosis polyangiitis/Marylin's  Corneal/Scleral Melts requiring patch grafts     Per Dr. 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 If systemic disease remains quiet, if local/ocular flares can be controlled with short bursts of prednisone and topical Rx, and if no further transplantation is contemplated, he may continue off systemic immunosuppression. Rituximab would be a good choice if graft-threatening eye disease recurs.\"    Gtts:   Protopic ~at bedtime OD  Prednisolone ~daily OD  PFATs ~daily right eye  Surgery:  - K wound revision (6/2012) ([facility])  - Repair of K perforation with corneoscleral patch graft right eye (1/2008) ([facility])  - Glued AMT due to leakage of K transplant wound 12/2007) ([facility])  - Initial PKP ~1990's per patient    Assessment & Plan:  # Neurotrophic keratopathy right eye  - NEW epi defect 3/12/21 - RESOLVED  - sensation intact but reduced right eye (checked 3/12/21)  - RLL punctal plug 3/12/21  - Cultures NGTD (Aerobic, anerobic, fungal, HSV)  - Prokera ring placed 3/25/21, removed after 5 days  - Resolved epithelial defect    Plan:              - Continue PFATS Celluvisc 4-6x/day (or more) or similar   - Continue Protopic at bedtime (sometimes uses emycin instead)              - Continue prednisolone acetate daily    # H/o penetrating keratoplasty (PK) (1/17/2008), Right Eye: " from Granulomatosis w/ Polyangiitis   - most recent flare up was October 2016 (prior to that was January 2014)  - episcleral/scleral inflammation resolved, stromal haze continues, area of epithelialized 30-40% corneal thinning present prior (small round inferonasal) stable    #. Granulomatosis w/ polyangiitis: follows Rheumatology at the [facility]   - off systemic immunosuppression, continue f/u with rheum    # Pseudophakia, Right Eye   - Stable, monitor .    RTC 6 months. Diagnostic MRx if pinholes to at least 20/40 or better.    Enedelia Sampson MD  Cornea & External Disease Fellow    Attending Physician Attestation:  Complete documentation of historical and exam elements from today's encounter can be found in the full encounter summary report (not reduplicated in this progress note).  I personally obtained the chief complaint(s) and history of present illness.  I confirmed and edited as necessary the review of systems, past medical/surgical history, family history, social history, and examination findings as documented by others; and I examined the patient myself.  I personally reviewed the relevant tests, images, and reports as documented above.  I formulated and edited as necessary the assessment and plan and discussed the findings and management plan with the patient and family. - Andriy Harmon MD

## 2021-05-06 NOTE — NURSING NOTE
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  Denies any pain, irritation, discharge, or tearing.  Ocular meds: Protopic at bedtime RE, Prednisolone daily RE, & PFAT's 4-6x/day RE    Leyla Mehta OT 7:40 AM May 6, 2021

## 2021-08-23 ENCOUNTER — TELEPHONE (OUTPATIENT)
Dept: RHEUMATOLOGY | Facility: CLINIC | Age: 51
End: 2021-08-23

## 2021-09-25 ENCOUNTER — HEALTH MAINTENANCE LETTER (OUTPATIENT)
Age: 51
End: 2021-09-25

## 2021-11-18 ENCOUNTER — OFFICE VISIT (OUTPATIENT)
Dept: OPHTHALMOLOGY | Facility: CLINIC | Age: 51
End: 2021-11-18
Attending: OPHTHALMOLOGY
Payer: COMMERCIAL

## 2021-11-18 DIAGNOSIS — H17.9 CORNEAL SCAR AND OPACITY: ICD-10-CM

## 2021-11-18 DIAGNOSIS — Z96.1 PSEUDOPHAKIA OF RIGHT EYE: ICD-10-CM

## 2021-11-18 DIAGNOSIS — H16.231 NEUROTROPHIC KERATOCONJUNCTIVITIS OF RIGHT EYE: ICD-10-CM

## 2021-11-18 DIAGNOSIS — Z94.7 HISTORY OF PENETRATING KERATOPLASTY: Primary | ICD-10-CM

## 2021-11-18 PROCEDURE — 99214 OFFICE O/P EST MOD 30 MIN: CPT | Mod: GC | Performed by: OPHTHALMOLOGY

## 2021-11-18 PROCEDURE — G0463 HOSPITAL OUTPT CLINIC VISIT: HCPCS

## 2021-11-18 ASSESSMENT — REFRACTION_MANIFEST
OD_AXIS: 148
OD_SPHERE: +1.50
OD_CYLINDER: +1.50

## 2021-11-18 ASSESSMENT — VISUAL ACUITY
CORRECTION_TYPE: GLASSES
OS_CC+: -2
OD_SC+: -2
OD_SC: 20/70
METHOD: SNELLEN - LINEAR
OS_CC: 20/15
OD_PH_SC: 20/30
METHOD_MR: DIAGNOSTIC/NO CHARGE

## 2021-11-18 ASSESSMENT — SLIT LAMP EXAM - LIDS
COMMENTS: NORMAL
COMMENTS: NORMAL

## 2021-11-18 ASSESSMENT — TONOMETRY
OD_IOP_MMHG: 10
OS_IOP_MMHG: 15
IOP_METHOD: ICARE

## 2021-11-18 ASSESSMENT — REFRACTION_WEARINGRX
OS_CYLINDER: +1.00
OS_AXIS: 087
OD_SPHERE: BALANCE
SPECS_TYPE: SVL
OS_SPHERE: -3.00

## 2021-11-18 ASSESSMENT — CONF VISUAL FIELD
OS_NORMAL: 1
OD_INFERIOR_NASAL_RESTRICTION: 3
METHOD: COUNTING FINGERS

## 2021-11-18 ASSESSMENT — EXTERNAL EXAM - LEFT EYE: OS_EXAM: NORMAL

## 2021-11-18 ASSESSMENT — EXTERNAL EXAM - RIGHT EYE: OD_EXAM: NORMAL

## 2021-11-18 NOTE — PROGRESS NOTES
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     Interval history:   6 month follow up. Vision fluctuates in the right eye, but this is usual for him. No diplopia. A few new floaters since one year, intermittent. No flashes, floaters, or diplopia. No pain, redness, or tearing.     POHx:  Granulomatosis polyangiitis/Marylin's  Corneal/Scleral Melts requiring patch grafts     Per Dr. 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 If systemic disease remains quiet, if local/ocular flares can be controlled with short bursts of prednisone and topical Rx, and if no further transplantation is contemplated, he may continue off systemic immunosuppression. Rituximab would be a good choice if graft-threatening eye disease recurs.\"    Gtts:   Protopic ~at bedtime OD  Prednisolone ~daily OD  PFATs 3-4x right eye  Surgery:  - K wound revision (6/2012) ([facility])  - Repair of K perforation with corneoscleral patch graft right eye (1/2008) ([facility])  - Glued AMT due to leakage of K transplant wound 12/2007) ([facility])  - Initial PKP ~1990's per patient    Assessment & Plan:  # Neurotrophic keratopathy right eye  - epi defect 3/12/21 - RESOLVED  - sensation intact but reduced right eye (checked 3/12/21)  - RLL punctal plug 3/12/21  - Cultures NGTD (Aerobic, anerobic, fungal, HSV)  - Prokera ring placed 3/25/21, removed after 5 days  - Resolved epithelial defect    Plan:              - Continue PFATS Celluvisc 4-6x/day (or more) or similar   - Continue Protopic at bedtime (sometimes uses emycin instead)              - Continue prednisolone acetate daily   - recommended dilated exam with optometrist    # H/o penetrating keratoplasty (PK) (1/17/2008), Right Eye: from " Granulomatosis w/ Polyangiitis   - most recent flare up was October 2016 (prior to that was January 2014)  - episcleral/scleral inflammation resolved, stromal haze continues, area of epithelialized 30-40% corneal thinning present prior (small round inferonasal) stable  -cut exposed suture tip.    #. Granulomatosis w/ polyangiitis: follows Rheumatology at the [facility]   - off systemic immunosuppression, continue f/u with rheum    # Pseudophakia, Right Eye   - Stable, monitor .    RTC 6 months, v/t or sooner if worsening sx    Brooke Delong MD  PGY-3 Resident Physician  Department of Ophthalmology      Attending Physician Attestation:  Complete documentation of historical and exam elements from today's encounter can be found in the full encounter summary report (not reduplicated in this progress note).  I personally obtained the chief complaint(s) and history of present illness.  I confirmed and edited as necessary the review of systems, past medical/surgical history, family history, social history, and examination findings as documented by others; and I examined the patient myself.  I personally reviewed the relevant tests, images, and reports as documented above.  I formulated and edited as necessary the assessment and plan and discussed the findings and management plan with the patient and family. - Andriy Harmon MD

## 2021-11-18 NOTE — NURSING NOTE
Chief Complaints and History of Present Illnesses   Patient presents with     Follow Up     Neurotrophic keratopathy right eye     Chief Complaint(s) and History of Present Illness(es)     Follow Up     Laterality: right eye    Course: stable    Associated symptoms: Negative for eye pain, dryness, redness and headache    Treatments tried: eye drops, ointment and artificial tears    Pain scale: 0/10    Comments: Neurotrophic keratopathy right eye              Comments     He states that his vision has seemed stable in both eyes, since his last eye exam.  Patient denies having any eye discomfort.    He is using:  Protopic ointment~at bedtime right eye  Prednisolone ~daily right eye   PFATs ~daily right eye    RHODA Buitrago 7:35 AM  November 18, 2021

## 2022-01-14 ENCOUNTER — LAB (OUTPATIENT)
Dept: LAB | Facility: CLINIC | Age: 52
End: 2022-01-14
Attending: INTERNAL MEDICINE
Payer: COMMERCIAL

## 2022-01-14 ENCOUNTER — OFFICE VISIT (OUTPATIENT)
Dept: RHEUMATOLOGY | Facility: CLINIC | Age: 52
End: 2022-01-14
Attending: INTERNAL MEDICINE
Payer: COMMERCIAL

## 2022-01-14 VITALS
DIASTOLIC BLOOD PRESSURE: 95 MMHG | BODY MASS INDEX: 26.95 KG/M2 | WEIGHT: 193.2 LBS | OXYGEN SATURATION: 97 % | SYSTOLIC BLOOD PRESSURE: 144 MMHG | HEART RATE: 83 BPM

## 2022-01-14 DIAGNOSIS — Z23 NEED FOR INFLUENZA VACCINATION: ICD-10-CM

## 2022-01-14 DIAGNOSIS — I77.82 ANCA-ASSOCIATED VASCULITIS (H): Primary | ICD-10-CM

## 2022-01-14 DIAGNOSIS — I77.82 ANCA-ASSOCIATED VASCULITIS (H): ICD-10-CM

## 2022-01-14 LAB
ALBUMIN SERPL-MCNC: 4.1 G/DL (ref 3.4–5)
ALBUMIN UR-MCNC: NEGATIVE MG/DL
ALP SERPL-CCNC: 65 U/L (ref 40–150)
ALT SERPL W P-5'-P-CCNC: 29 U/L (ref 0–70)
ANION GAP SERPL CALCULATED.3IONS-SCNC: 6 MMOL/L (ref 3–14)
APPEARANCE UR: CLEAR
AST SERPL W P-5'-P-CCNC: 14 U/L (ref 0–45)
BASOPHILS # BLD AUTO: 0.1 10E3/UL (ref 0–0.2)
BASOPHILS NFR BLD AUTO: 1 %
BILIRUB SERPL-MCNC: 0.3 MG/DL (ref 0.2–1.3)
BILIRUB UR QL STRIP: NEGATIVE
BUN SERPL-MCNC: 18 MG/DL (ref 7–30)
CALCIUM SERPL-MCNC: 9 MG/DL (ref 8.5–10.1)
CHLORIDE BLD-SCNC: 105 MMOL/L (ref 94–109)
CO2 SERPL-SCNC: 27 MMOL/L (ref 20–32)
COLOR UR AUTO: YELLOW
CREAT SERPL-MCNC: 1.19 MG/DL (ref 0.66–1.25)
EOSINOPHIL # BLD AUTO: 0.2 10E3/UL (ref 0–0.7)
EOSINOPHIL NFR BLD AUTO: 2 %
ERYTHROCYTE [DISTWIDTH] IN BLOOD BY AUTOMATED COUNT: 11.9 % (ref 10–15)
ERYTHROCYTE [SEDIMENTATION RATE] IN BLOOD BY WESTERGREN METHOD: 6 MM/HR (ref 0–15)
GFR SERPL CREATININE-BSD FRML MDRD: 75 ML/MIN/1.73M2
GLUCOSE BLD-MCNC: 101 MG/DL (ref 70–99)
GLUCOSE UR STRIP-MCNC: NEGATIVE MG/DL
HCT VFR BLD AUTO: 44.8 % (ref 40–53)
HGB BLD-MCNC: 15.5 G/DL (ref 13.3–17.7)
HGB UR QL STRIP: NEGATIVE
IMM GRANULOCYTES # BLD: 0.1 10E3/UL
IMM GRANULOCYTES NFR BLD: 1 %
KETONES UR STRIP-MCNC: NEGATIVE MG/DL
LEUKOCYTE ESTERASE UR QL STRIP: NEGATIVE
LYMPHOCYTES # BLD AUTO: 2.2 10E3/UL (ref 0.8–5.3)
LYMPHOCYTES NFR BLD AUTO: 24 %
MCH RBC QN AUTO: 30.7 PG (ref 26.5–33)
MCHC RBC AUTO-ENTMCNC: 34.6 G/DL (ref 31.5–36.5)
MCV RBC AUTO: 89 FL (ref 78–100)
MONOCYTES # BLD AUTO: 0.7 10E3/UL (ref 0–1.3)
MONOCYTES NFR BLD AUTO: 8 %
MUCOUS THREADS #/AREA URNS LPF: PRESENT /LPF
NEUTROPHILS # BLD AUTO: 5.9 10E3/UL (ref 1.6–8.3)
NEUTROPHILS NFR BLD AUTO: 64 %
NITRATE UR QL: NEGATIVE
NRBC # BLD AUTO: 0 10E3/UL
NRBC BLD AUTO-RTO: 0 /100
PH UR STRIP: 5 [PH] (ref 5–7)
PLATELET # BLD AUTO: 388 10E3/UL (ref 150–450)
POTASSIUM BLD-SCNC: 4.2 MMOL/L (ref 3.4–5.3)
PROT SERPL-MCNC: 7.6 G/DL (ref 6.8–8.8)
RBC # BLD AUTO: 5.05 10E6/UL (ref 4.4–5.9)
RBC URINE: 1 /HPF
SODIUM SERPL-SCNC: 138 MMOL/L (ref 133–144)
SP GR UR STRIP: 1.02 (ref 1–1.03)
UROBILINOGEN UR STRIP-MCNC: NORMAL MG/DL
WBC # BLD AUTO: 9.2 10E3/UL (ref 4–11)
WBC URINE: 1 /HPF

## 2022-01-14 PROCEDURE — 85652 RBC SED RATE AUTOMATED: CPT | Performed by: PATHOLOGY

## 2022-01-14 PROCEDURE — 86256 FLUORESCENT ANTIBODY TITER: CPT | Mod: 90 | Performed by: PATHOLOGY

## 2022-01-14 PROCEDURE — 99000 SPECIMEN HANDLING OFFICE-LAB: CPT | Performed by: PATHOLOGY

## 2022-01-14 PROCEDURE — 36415 COLL VENOUS BLD VENIPUNCTURE: CPT | Performed by: PATHOLOGY

## 2022-01-14 PROCEDURE — G0463 HOSPITAL OUTPT CLINIC VISIT: HCPCS

## 2022-01-14 PROCEDURE — 81001 URINALYSIS AUTO W/SCOPE: CPT | Performed by: PATHOLOGY

## 2022-01-14 PROCEDURE — G0008 ADMIN INFLUENZA VIRUS VAC: HCPCS | Performed by: INTERNAL MEDICINE

## 2022-01-14 PROCEDURE — 80053 COMPREHEN METABOLIC PANEL: CPT | Performed by: PATHOLOGY

## 2022-01-14 PROCEDURE — 85025 COMPLETE CBC W/AUTO DIFF WBC: CPT | Performed by: PATHOLOGY

## 2022-01-14 PROCEDURE — 90686 IIV4 VACC NO PRSV 0.5 ML IM: CPT | Performed by: INTERNAL MEDICINE

## 2022-01-14 PROCEDURE — 86036 ANCA SCREEN EACH ANTIBODY: CPT | Mod: 90 | Performed by: PATHOLOGY

## 2022-01-14 PROCEDURE — 99214 OFFICE O/P EST MOD 30 MIN: CPT | Performed by: INTERNAL MEDICINE

## 2022-01-14 PROCEDURE — 250N000011 HC RX IP 250 OP 636: Performed by: INTERNAL MEDICINE

## 2022-01-14 RX ADMIN — INFLUENZA A VIRUS A/GUANGDONG-MAONAN/SWL1536/2019 CNIC-1909 (H1N1) ANTIGEN (FORMALDEHYDE INACTIVATED), INFLUENZA A VIRUS A/HONG KONG/2671/2019 (H3N2) ANTIGEN (FORMALDEHYDE INACTIVATED), INFLUENZA B VIRUS B/PHUKET/3073/2013 ANTIGEN (FORMALDEHYDE INACTIVATED), AND INFLUENZA B VIRUS B/WASHINGTON/02/2019 ANTIGEN (FORMALDEHYDE INACTIVATED) 0.5 ML: 15; 15; 15; 15 INJECTION, SUSPENSION INTRAMUSCULAR at 07:46

## 2022-01-14 NOTE — PATIENT INSTRUCTIONS
Diagnosis:  1. 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 No immune modulating medication is warranted.    Plan:  1.  Check ANCA, creatinine, liver function, CBC with differential, urinalysis.  2.  Monitor for secondary malignancies associated with prior cyclophosphamide use by monitoring for systemic symptoms, and annual urinalysis.  Obtain age-appropriate cancer screening (e.g. colonoscopy at age 50).  3. Monitor BP at home weekly, record diastolic and systolic blood pressures and review blood pressure record with primary care to determine adequacy of antihypertensive therapy.  Goal 130/80.  4.  Continue follow-up with ophthalmology and regular topical use of prednisolone and Protopic.

## 2022-01-14 NOTE — LETTER
2022       RE: Braxton Ayala  [address] [facility] [address]     Dear Colleague,    Thank you for referring your patient, Braxton Ayala, to the [facility] R[facility]UMATOLOGY [facility] [place] at [facility]. Please see a copy of my visit note below.        [facility]  Rheumatology Clinic  Quique Gould MD  2022    Name: Braxton Ayala  MRN: [id number]  Age: 51 year old  : 1970  Referring provider: [facility]    Assessment and Plan:  # Limited ANCA-associated vasculitis: Sustained eye symptoms or signs have not recurred since . He remains asymptomatic other than an episode of R eye pain in 3-21. Exam is unchanged. ANCA was low in  (negative since at least ), and laboratory evaluation in  showed creatinine, transaminases, CBC, and urinalysis all negative or normal.    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 but I will screen for activity with ANCA, Cr, UA, CBC, LFT. If systemic disease remains quiet, if local/ocular flares can be controlled with short bursts of prednisone and topical Rx, and if no further transplantation is contemplated, he may continue off systemic immunosuppression. Continue with Protopic (tacrolimus) treatment daily and steroid (dexamethasone) as needed, per Ophthalmology.    Rituximab would be a good choice if graft-threatening eye disease recurs.    #Health maintenance: Patient is hypertensive today, despite use of lisinopril.  I recommend regular monitoring of blood pressure and follow-up with primary care.  Blood pressure control target should be 130/80.  Patient is status post COVID-19 full vaccination and booster as of 2021.    Needs flu shot today    Follow-up: Return in about 1 year    Quique Gould MD  Staff Rheumatologist, [facility]      HPI:   Braxton Ayala has a history including limited ANCA-associated vasculitis who presents for follow-up. I last saw the  "patient on 11-20-20, at which time the patient continued to remain stable off of systemic immunosuppression without new symptoms or symptom progression. The plan was to proceed with watchful waiting.    Limited ANCA-associated vasculitis:  R corneal \"melt\" 2007, s/p 9 months of Cytoxan infusion completed 2008, s/p maintenance azathioprine through 9-10. Corneal graft failure and repeat transplant in 4-11; Rx MMF 1000 qd from 9-12 through 12-13    Interval history 1-  He did have an episode of right eye pain in March 2021.  Diagnosis through ophthalmology was epithelial abrasion, now improved and continuing to monitor.    Patient saw Dr. Harmon in ophthalmology in November 2021; status of right corneal transplant was thought to be stable.  No bloody nasal drainage or phlegm.   No eye pain, watering.  +stable R hand 5th digit lancinating pain with altered sensation.   No rash other than \"red dots\" blanchable; not itchy.  Monitoring BP q month.  Not exercising regularly. Does walk the dog, no DYSPNEA ON EXERTION.  COVID19 vaccinated and boosted.      Interval history 11-:  In January 2020, he developed a corneal ulcer.  He was treated in ophthalmology with tacrolimus, prednisolone topical therapy.  Symptoms and signs resolved as of follow-up visit with ophthalmology in March 2020.    He tested positive for COVI19 on 10-30-20 after workplace exposure. He had fever, cough, congestion for a few days. He had marked fatigue for 3 days. Tested negative 4 days later. Still wheezing, but energy level has returned. Treated with aspirin only during illness.    He had flu shot on 10-10; he had marked fever shortly afterwards.    His R eye occasionally gets \"irritated\"--he uses steroid drops. No further episodes of eye irritation since 3-2020.    No chest, abdomen, skin, joint, nerve symptoms recently.       Review of Systems:   Pertinent items are noted in HPI or as below, remainder of complete ROS is negative.  "     No recent problems with hearing or vision. No swallowing problems.   No breathing difficulty, shortness of breath, coughing, or wheezing.  No chest pain or palpitations.  No heart burn, indigestion, abdominal pain, nausea, vomiting, diarrhea.  No urination problems, no bloody, cloudy urine, no dysuria.  No numbing, tingling, weakness.  No headaches or confusion.  No rashes. No easy bleeding or bruising.     Active Medications:   Current Outpatient Medications   Medication Sig     acetaminophen (TYLENOL) 325 MG tablet Take 325-650 mg by mouth every 6 hours as needed     carboxymethylcellulose PF (REFRESH PLUS) 0.5 % ophthalmic solution 1 drop 3 times daily as needed     ibuprofen (ADVIL/MOTRIN) 200 MG tablet Take 200 mg by mouth every 4 hours as needed     lisinopril (PRINIVIL/ZESTRIL) 20 MG tablet Take 20 mg by mouth     prednisoLONE acetate (PRED FORTE) 1 % ophthalmic suspension Place 1 drop into the right eye daily     PROTOPIC 0.1 % ointment Apply topically daily in the right eye     aspirin (ASA) 325 MG EC tablet Take 325 mg by mouth every 6 hours as needed (Patient not taking: Reported on 1/14/2022)     erythromycin (ROMYCIN) 5 MG/GM ophthalmic ointment Place 0.5 inches into the right eye every 2 hours (Patient not taking: Reported on 1/14/2022)     moxifloxacin (VIGAMOX) 0.5 % ophthalmic solution Place 1 drop into the right eye 4 times daily (Patient not taking: Reported on 1/14/2022)     Current Facility-Administered Medications   Medication     influenza quadrivalent (PF) vacc (FLUZONE) injection 0.5 mL         Allergies:  No known drug allergies.     Past Medical History:  ANCA associated vasculitis, Limited WG presenting with corneal melt in 2008, s/p 9 months of cytoxan infusion finished in july 2008, started on Imuran 150mg/d (8/08) and prednisone was tapered off by the end of sept 2008, s/p corneal transplant graft R eye march 2009, developed Cataracts,s/p R eye cataracts surgery 3-10. Imuran  "tapered off 9-10. \"Rejection\" of graft 9-10 treated with oral prednisone and steroid drops. 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 New graft placed 4-11.  Hypertension  Nonsenile cataract  Marylin's disease  Corneal defect    Past Surgical History:  Right eye removal and replacement of suture  Corneal transplant, penetrating x2  Cataract IOL bilateral    Family History:    Family history reviewed. No pertinent family history.    Social History:  The patient reports that he has never smoked. He has never used smokeless tobacco. He reports current alcohol use. He reports that he does not use drugs.   PCP: [facility]  Marital Status:      Physical Exam:   BP (!) 144/95   Pulse 83   Wt 87.6 kg (193 lb 3.2 oz)   SpO2 97%   BMI 26.95 kg/m     Wt Readings from Last 4 Encounters:   01/14/22 87.6 kg (193 lb 3.2 oz)   11/22/19 83.9 kg (184 lb 14.4 oz)   09/14/18 86.2 kg (190 lb)   09/15/17 80.3 kg (177 lb)     Constitutional: Well-developed, appearing stated age; cooperative.  Eyes: Normal EOM, PERRLA, vision, conjunctiva, sclera. Unchanged sclerosis in the right cornea with ovoid iris.  ENT: Normal external ears, nose, hearing, lips, teeth, gums, throat. No mucous membrane lesions.  Neck: No mass or thyroid enlargement.  Resp: breathing unlabored  Skin: Multiple cherry angiomata over the anterior chest    Neuro: Normal cranial nerves  Psych: Normal judgement, orientation, memory, affect.     Laboratory:   RHEUM RESULTS Latest Ref Rng & Units 10/18/2007 10/19/2007 10/21/2007   ALBUMIN 3.4 - 5.0 g/dL - 4.7(H) -   ALT 0 - 70 U/L - 8 -   AST 0 - 45 U/L - 22 -   ANCA <1:20 - - -   CREATININE 0.66 - 1.25 mg/dL 1.28 - 1.09   CRP 0.0 - 8.0 mg/L - - -   GFR ESTIMATE, IF BLACK >60 mL/min/[1.73:m2] 82 - >90   GFR ESTIMATE >60 mL/min/[1.73:m2] 68 - 82   HEMATOCRIT 40.0 - 53.0 % 45.2 46.0 39.1(L)   HEMOGLOBIN 13.3 - 17.7 g/dL 15.4 15.6 13.4    - 1620 mg/dL - - -   WBC 4.0 - 11.0 10e9/L " 10.3 13.3(H) 22.2(H)   RBC 4.4 - 5.9 10e12/L 4.88 4.99 4.23(L)   RDW 10.0 - 15.0 % 13.1 13.0 12.9   MCHC 31.5 - 36.5 g/dL 34.1 34.0 34.3   MCV 78 - 100 fl 93 92 92   PLATELET COUNT 150 - 450 10e9/L 327 357 303   ESR 0 - 15 mm/h - - -     Neutrophil Cytoplasmic IgG Antibody   Date Value Ref Range Status   09/15/2017 <1:20 <1:20 Final     Comment:     (Note)  The ANCA IFA is <1:20; therefore, no further testing will   be performed.  INTERPRETIVE INFORMATION: Anti-Neutrophil Cyto Ab, IgG  Neutrophil Cytoplasmic Antibodies (C-ANCA = granular   cytoplasmic staining, P-ANCA = perinuclear staining) are   found in the serum of over 90 percent of patients with   certain necrotizing systemic vasculitides, and usually in   less than 5 percent of patients with collagen vascular   disease or arthritis.  Performed by [facility],  [address] [place] [id number] [phone]  [email or web address], Tramaine Allison MD, Lab. Director       IGG   Date Value Ref Range Status   06/24/2009 1220 695 - 1620 mg/dL Final       Again, thank you for allowing me to participate in the care of your patient.      Sincerely,    Quique Gould MD

## 2022-01-14 NOTE — PROGRESS NOTES
"[facility]  Rheumatology Clinic  Quique Gould MD  2022    Name: Braxton Ayala  MRN: [id number]  Age: 51 year old  : 1970  Referring [facility]    Assessment and Plan:  # Limited ANCA-associated vasculitis: Sustained eye symptoms or signs have not recurred since . He remains asymptomatic other than an episode of R eye pain in 3-. Exam is unchanged. ANCA was low in  (negative since at least ), and laboratory evaluation in  showed creatinine, transaminases, CBC, and urinalysis all negative or normal.    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 but I will screen for activity with ANCA, Cr, UA, CBC, LFT. If systemic disease remains quiet, if local/ocular flares can be controlled with short bursts of prednisone and topical Rx, and if no further transplantation is contemplated, he may continue off systemic immunosuppression. Continue with Protopic (tacrolimus) treatment daily and steroid (dexamethasone) as needed, per Ophthalmology.    Rituximab would be a good choice if graft-threatening eye disease recurs.    #Health maintenance: Patient is hypertensive today, despite use of lisinopril.  I recommend regular monitoring of blood pressure and follow-up with primary care.  Blood pressure control target should be 130/80.  Patient is status post COVID-19 full vaccination and booster as of 2021.    Needs flu shot today    Follow-up: Return in about 1 year    Quique Gould MD  Staff Rheumatologist, [facility]      HPI:   Braxton Ayala has a history including limited ANCA-associated vasculitis who presents for follow-up. I last saw the patient on 20, at which time the patient continued to remain stable off of systemic immunosuppression without new symptoms or symptom progression. The plan was to proceed with watchful waiting.    Limited ANCA-associated vasculitis:  R corneal \"melt\" , s/p 9 months of Cytoxan infusion completed , s/p " "maintenance azathioprine through 9-10. Corneal graft failure and repeat transplant in 4-11; Rx MMF 1000 qd from 9-12 through 12-13    Interval history 1-  He did have an episode of right eye pain in March 2021.  Diagnosis through ophthalmology was epithelial abrasion, now improved and continuing to monitor.    Patient saw Dr. Harmon in ophthalmology in November 2021; status of right corneal transplant was thought to be stable.  No bloody nasal drainage or phlegm.   No eye pain, watering.  +stable R hand 5th digit lancinating pain with altered sensation.   No rash other than \"red dots\" blanchable; not itchy.  Monitoring BP q month.  Not exercising regularly. Does walk the dog, no DYSPNEA ON EXERTION.  COVID19 vaccinated and boosted.      Interval history 11-:  In January 2020, he developed a corneal ulcer.  He was treated in ophthalmology with tacrolimus, prednisolone topical therapy.  Symptoms and signs resolved as of follow-up visit with ophthalmology in March 2020.    He tested positive for COVI19 on 10-30-20 after workplace exposure. He had fever, cough, congestion for a few days. He had marked fatigue for 3 days. Tested negative 4 days later. Still wheezing, but energy level has returned. Treated with aspirin only during illness.    He had flu shot on 10-10; he had marked fever shortly afterwards.    His R eye occasionally gets \"irritated\"--he uses steroid drops. No further episodes of eye irritation since 3-2020.    No chest, abdomen, skin, joint, nerve symptoms recently.       Review of Systems:   Pertinent items are noted in HPI or as below, remainder of complete ROS is negative.      No recent problems with hearing or vision. No swallowing problems.   No breathing difficulty, shortness of breath, coughing, or wheezing.  No chest pain or palpitations.  No heart burn, indigestion, abdominal pain, nausea, vomiting, diarrhea.  No urination problems, no bloody, cloudy urine, no dysuria.  No " "numbing, tingling, weakness.  No headaches or confusion.  No rashes. No easy bleeding or bruising.     Active Medications:   Current Outpatient Medications   Medication Sig     acetaminophen (TYLENOL) 325 MG tablet Take 325-650 mg by mouth every 6 hours as needed     carboxymethylcellulose PF (REFRESH PLUS) 0.5 % ophthalmic solution 1 drop 3 times daily as needed     ibuprofen (ADVIL/MOTRIN) 200 MG tablet Take 200 mg by mouth every 4 hours as needed     lisinopril (PRINIVIL/ZESTRIL) 20 MG tablet Take 20 mg by mouth     prednisoLONE acetate (PRED FORTE) 1 % ophthalmic suspension Place 1 drop into the right eye daily     PROTOPIC 0.1 % ointment Apply topically daily in the right eye     aspirin (ASA) 325 MG EC tablet Take 325 mg by mouth every 6 hours as needed (Patient not taking: Reported on 1/14/2022)     erythromycin (ROMYCIN) 5 MG/GM ophthalmic ointment Place 0.5 inches into the right eye every 2 hours (Patient not taking: Reported on 1/14/2022)     moxifloxacin (VIGAMOX) 0.5 % ophthalmic solution Place 1 drop into the right eye 4 times daily (Patient not taking: Reported on 1/14/2022)     Current Facility-Administered Medications   Medication     influenza quadrivalent (PF) vacc (FLUZONE) injection 0.5 mL         Allergies:  No known drug allergies.     Past Medical History:  ANCA associated vasculitis, Limited WG presenting with corneal melt in 2008, s/p 9 months of cytoxan infusion finished in july 2008, started on Imuran 150mg/d (8/08) and prednisone was tapered off by the end of sept 2008, s/p corneal transplant graft R eye march 2009, developed Cataracts,s/p R eye cataracts surgery 3-10. Imuran tapered off 9-10. \"Rejection\" of graft 9-10 treated with oral prednisone and steroid drops. 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 New graft placed 4-11.  Hypertension  Nonsenile cataract  Marylin's disease  Corneal defect    Past Surgical History:  Right eye removal and replacement of " suture  Corneal transplant, penetrating x2  Cataract IOL bilateral    Family History:    Family history reviewed. No pertinent family history.    Social History:  The patient reports that he has never smoked. He has never used smokeless tobacco. He reports current alcohol use. He reports that he does not use drugs.   PCP: Lucina, Darshan Grigsby  Marital Status:      Physical Exam:   BP (!) 144/95   Pulse 83   Wt 87.6 kg (193 lb 3.2 oz)   SpO2 97%   BMI 26.95 kg/m     Wt Readings from Last 4 Encounters:   01/14/22 87.6 kg (193 lb 3.2 oz)   11/22/19 83.9 kg (184 lb 14.4 oz)   09/14/18 86.2 kg (190 lb)   09/15/17 80.3 kg (177 lb)     Constitutional: Well-developed, appearing stated age; cooperative.  Eyes: Normal EOM, PERRLA, vision, conjunctiva, sclera. Unchanged sclerosis in the right cornea with ovoid iris.  ENT: Normal external ears, nose, hearing, lips, teeth, gums, throat. No mucous membrane lesions.  Neck: No mass or thyroid enlargement.  Resp: breathing unlabored  Skin: Multiple cherry angiomata over the anterior chest    Neuro: Normal cranial nerves  Psych: Normal judgement, orientation, memory, affect.     Laboratory:   RHEUM RESULTS Latest Ref Rng & Units 10/18/2007 10/19/2007 10/21/2007   ALBUMIN 3.4 - 5.0 g/dL - 4.7(H) -   ALT 0 - 70 U/L - 8 -   AST 0 - 45 U/L - 22 -   ANCA <1:20 - - -   CREATININE 0.66 - 1.25 mg/dL 1.28 - 1.09   CRP 0.0 - 8.0 mg/L - - -   GFR ESTIMATE, IF BLACK >60 mL/min/[1.73:m2] 82 - >90   GFR ESTIMATE >60 mL/min/[1.73:m2] 68 - 82   HEMATOCRIT 40.0 - 53.0 % 45.2 46.0 39.1(L)   HEMOGLOBIN 13.3 - 17.7 g/dL 15.4 15.6 13.4    - 1620 mg/dL - - -   WBC 4.0 - 11.0 10e9/L 10.3 13.3(H) 22.2(H)   RBC 4.4 - 5.9 10e12/L 4.88 4.99 4.23(L)   RDW 10.0 - 15.0 % 13.1 13.0 12.9   MCHC 31.5 - 36.5 g/dL 34.1 34.0 34.3   MCV 78 - 100 fl 93 92 92   PLATELET COUNT 150 - 450 10e9/L 327 357 303   ESR 0 - 15 mm/h - - -     Neutrophil Cytoplasmic IgG Antibody   Date Value Ref Range Status    09/15/2017 <1:20 <1:20 Final     Comment:     (Note)  The ANCA IFA is <1:20; therefore, no further testing will   be performed.  INTERPRETIVE INFORMATION: Anti-Neutrophil Cyto Ab, IgG  Neutrophil Cytoplasmic Antibodies (C-ANCA = granular   cytoplasmic staining, P-ANCA = perinuclear staining) are   found in the serum of over 90 percent of patients with   certain necrotizing systemic vasculitides, and usually in   less than 5 percent of patients with collagen vascular   disease or arthritis.  Performed by [facility],  [address] [place] [id number] [phone]  [email or web address], Tramaine Allison MD, Lab. Director       IGG   Date Value Ref Range Status   06/24/2009 1220 695 - 1620 mg/dL Final

## 2022-01-15 ENCOUNTER — HEALTH MAINTENANCE LETTER (OUTPATIENT)
Age: 52
End: 2022-01-15

## 2022-01-17 LAB
ANCA AB PATTERN SER IF-IMP: NORMAL
C-ANCA TITR SER IF: NORMAL {TITER}

## 2022-05-19 ENCOUNTER — OFFICE VISIT (OUTPATIENT)
Dept: OPHTHALMOLOGY | Facility: CLINIC | Age: 52
End: 2022-05-19
Attending: OPHTHALMOLOGY
Payer: COMMERCIAL

## 2022-05-19 DIAGNOSIS — M31.30 GRANULOMATOSIS WITH POLYANGIITIS WITHOUT RENAL INVOLVEMENT (H): ICD-10-CM

## 2022-05-19 DIAGNOSIS — Z94.7 HISTORY OF PENETRATING KERATOPLASTY: Primary | ICD-10-CM

## 2022-05-19 DIAGNOSIS — Z96.1 PSEUDOPHAKIA OF RIGHT EYE: ICD-10-CM

## 2022-05-19 DIAGNOSIS — H17.9 CORNEAL SCAR AND OPACITY: ICD-10-CM

## 2022-05-19 DIAGNOSIS — H16.231 NEUROTROPHIC KERATOCONJUNCTIVITIS OF RIGHT EYE: ICD-10-CM

## 2022-05-19 PROCEDURE — 99214 OFFICE O/P EST MOD 30 MIN: CPT | Performed by: OPHTHALMOLOGY

## 2022-05-19 PROCEDURE — G0463 HOSPITAL OUTPT CLINIC VISIT: HCPCS

## 2022-05-19 ASSESSMENT — VISUAL ACUITY
OD_PH_CC: 20/30
METHOD: SNELLEN - LINEAR
OS_CC: 20/15
OD_CC: 20/60
OD_PH_CC+: -1

## 2022-05-19 ASSESSMENT — REFRACTION_WEARINGRX
SPECS_TYPE: SVL
OS_SPHERE: -3.00
OD_SPHERE: BALANCE
OS_CYLINDER: +1.00
OS_AXIS: 087

## 2022-05-19 ASSESSMENT — CONF VISUAL FIELD
METHOD: COUNTING FINGERS
OD_NORMAL: 1
OS_NORMAL: 1

## 2022-05-19 ASSESSMENT — TONOMETRY
OD_IOP_MMHG: 9
OS_IOP_MMHG: 13
IOP_METHOD: ICARE

## 2022-05-19 ASSESSMENT — SLIT LAMP EXAM - LIDS
COMMENTS: NORMAL
COMMENTS: NORMAL

## 2022-05-19 ASSESSMENT — EXTERNAL EXAM - LEFT EYE: OS_EXAM: NORMAL

## 2022-05-19 ASSESSMENT — EXTERNAL EXAM - RIGHT EYE: OD_EXAM: NORMAL

## 2022-05-19 NOTE — NURSING NOTE
Chief Complaints and History of Present Illnesses   Patient presents with     keratoplasty follow up     Chief Complaint(s) and History of Present Illness(es)     keratoplasty follow up               Comments     History of penetrating keratoplasty follow up. 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 He says vision seems about the same as last visit   Denies eye pain, flashes of floaters.     Tevin White COT 7:49 AM May 19, 2022

## 2022-05-19 NOTE — PROGRESS NOTES
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     Interval history:   6 month follow up. Vision fluctuates in the right eye, but this is usual for him. No diplopia. A few new floaters since one year, intermittent. No flashes, floaters, or diplopia. No pain, redness, or tearing.     POHx:  Granulomatosis polyangiitis/Marylin's  Corneal/Scleral Melts requiring patch grafts     Per Dr. 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 If systemic disease remains quiet, if local/ocular flares can be controlled with short bursts of prednisone and topical Rx, and if no further transplantation is contemplated, he may continue off systemic immunosuppression. Rituximab would be a good choice if graft-threatening eye disease recurs.\"    Gtts:   Protopic ~at bedtime OD  Prednisolone ~daily OD  PFATs 3-4x right eye  Surgery:  - K wound revision (6/2012) ([place])  - Repair of K perforation with corneoscleral patch graft right eye (1/2008) ([facility])  - Glued AMT due to leakage of K transplant wound 12/2007) ([facility])  - Initial PKP ~1990's per patient    Assessment & Plan:  # Neurotrophic keratopathy right eye  - epi defect 3/12/21 - RESOLVED  - sensation intact but reduced right eye (checked 3/12/21)  - RLL punctal plug 3/12/21  - Cultures NGTD (Aerobic, anerobic, fungal, HSV)  - Prokera ring placed 3/25/21, removed after 5 days  - Resolved epithelial defect    Plan:              - Continue PFATS Celluvisc 4-6x/day (or more) or similar   - Continue Protopic at bedtime (sometimes uses emycin instead)              - Continue prednisolone acetate daily to Q OD   - recommended dilated exam with optometrist    # H/o penetrating keratoplasty (PK) (1/17/2008), Right " Eye: from Granulomatosis w/ Polyangiitis   - most recent flare up was October 2016 (prior to that was January 2014)  - episcleral/scleral inflammation resolved, stromal haze continues, area of epithelialized 30-40% corneal thinning present prior (small round inferonasal) stable  -cut exposed suture tip.    #. Granulomatosis w/ polyangiitis: follows Rheumatology at the [facility]   - off systemic immunosuppression, continue f/u with rheum    # Pseudophakia, Right Eye   - Stable, monitor .    RTC 9 months, v/t or sooner if worsening sx      Attending Physician Attestation:  Complete documentation of historical and exam elements from today's encounter can be found in the full encounter summary report (not reduplicated in this progress note).  I personally obtained the chief complaint(s) and history of present illness.  I confirmed and edited as necessary the review of systems, past medical/surgical history, family history, social history, and examination findings as documented by others; and I examined the patient myself.  I personally reviewed the relevant tests, images, and reports as documented above.  I formulated and edited as necessary the assessment and plan and discussed the findings and management plan with the patient and family. - Andriy Harmon MD

## 2022-07-25 DIAGNOSIS — Z94.7 HISTORY OF PENETRATING KERATOPLASTY: ICD-10-CM

## 2022-07-25 RX ORDER — PREDNISOLONE ACETATE 10 MG/ML
1 SUSPENSION/ DROPS OPHTHALMIC DAILY
Qty: 15 ML | Refills: 0 | Status: SHIPPED | OUTPATIENT
Start: 2022-07-25 | End: 2023-04-13

## 2022-07-25 NOTE — TELEPHONE ENCOUNTER
prednisoLONE acetate (PRED FORTE) 1 % ophthalmic suspension  Last Written Prescription Date:   5/6/2021  Last Fill Quantity: 15,   # refills: 11  Last Office Visit :  5/19/2022  Future Office visit:   2/28/2023     Andriy Harmon MD    Ophthalmology       Plan:              - Continue PFATS Celluvisc 4-6x/day (or more) or similar              - Continue Protopic at bedtime (sometimes uses emycin instead)              - Continue prednisolone acetate daily to Q OD              - recommended dilated exam with optometrist     # H/o penetrating keratoplasty (PK) (1/17/2008), Right Eye: from Granulomatosis w/ Polyangiitis   - most recent flare up was October 2016 (prior to that was January 2014)  - episcleral/scleral inflammation resolved, stromal haze continues, area of epithelialized 30-40% corneal thinning present prior (small round inferonasal) stable  -cut exposed suture tip.     #. Granulomatosis w/ polyangiitis: follows Rheumatology at the [facility]              - off systemic immunosuppression, continue f/u with rheum     # Pseudophakia, Right Eye             - Stable, monitor .     RTC 9 months, v/t or sooner if worsening sx  Routing refill request to provider for review/approval because:  Drug not on the [facility], [facility] or  Health refill protocol or controlled substance      Radha Beverly RN  Central Triage Red Flags/Med Refills

## 2022-08-05 ENCOUNTER — APPOINTMENT (OUTPATIENT)
Dept: URBAN - METROPOLITAN AREA CLINIC 260 | Age: 52
Setting detail: DERMATOLOGY
End: 2022-08-06

## 2022-08-05 VITALS — WEIGHT: 185 LBS | HEIGHT: 71 IN

## 2022-08-05 DIAGNOSIS — L91.8 OTHER HYPERTROPHIC DISORDERS OF THE SKIN: ICD-10-CM

## 2022-08-05 DIAGNOSIS — L74.51 PRIMARY FOCAL HYPERHIDROSIS: ICD-10-CM

## 2022-08-05 PROBLEM — L74.510 PRIMARY FOCAL HYPERHIDROSIS, AXILLA: Status: ACTIVE | Noted: 2022-08-05

## 2022-08-05 PROCEDURE — OTHER COUNSELING: OTHER

## 2022-08-05 PROCEDURE — OTHER ADDITIONAL NOTES: OTHER

## 2022-08-05 PROCEDURE — 11200 RMVL SKIN TAGS UP TO&INC 15: CPT

## 2022-08-05 PROCEDURE — OTHER MIPS QUALITY: OTHER

## 2022-08-05 PROCEDURE — OTHER SKIN TAG REMOVAL: OTHER

## 2022-08-05 PROCEDURE — 99202 OFFICE O/P NEW SF 15 MIN: CPT | Mod: 25

## 2022-08-05 ASSESSMENT — LOCATION SIMPLE DESCRIPTION DERM
LOCATION SIMPLE: RIGHT AXILLARY VAULT
LOCATION SIMPLE: LEFT AXILLARY VAULT

## 2022-08-05 ASSESSMENT — LOCATION DETAILED DESCRIPTION DERM
LOCATION DETAILED: RIGHT AXILLARY VAULT
LOCATION DETAILED: LEFT AXILLARY VAULT

## 2022-08-05 ASSESSMENT — LOCATION ZONE DERM: LOCATION ZONE: AXILLAE

## 2022-08-05 NOTE — PROCEDURE: ADDITIONAL NOTES
Additional Notes: Patient had expressed an interest in doing Yudy Dry for his hyperhidrosis . It was discussed with the patient that he should set up a consultation at our [facility] for further evaluation and treatment and discussion of any promos that might be going on. Patient was also given a pamphlet abut the Yudy Dry. Additional Notes: Patient had expressed an interest in doing Yudy Dry for his hyperhidrosis . It was discussed with the patient that he should set up a consultation at our [facility] for further evaluation and treatment and discussion of any promos that might be going on. Patient was also given a pamphlet abut the Yudy Dry.

## 2022-08-05 NOTE — HPI: SUBCUTANEOUS GROWTH
How Severe Is Your Growth?: mild
Has Your Growth Been Treated?: not been treated
Is This A New Presentation, Or A Follow-Up?: Subcutaneous Growth
Additional History: Patient states that he has a growth in his armpit has been there for decades, but in the past few weeks it has started to become painful. Patient presents today for further evaluation and treatment.

## 2022-08-05 NOTE — PROCEDURE: SKIN TAG REMOVAL
Medical Necessity Information: It is in your best interest to select a reason for this procedure from the list below. All of these items fulfill various CMS LCD requirements except the new and changing color options.
Anesthesia Volume In Cc: 3
Add Associated Diagnoses If Applicable When Selecting Medical Necessity: Yes
Detail Level: Detailed
Anesthesia Type: 1% lidocaine with epinephrine
Include Z78.9 (Other Specified Conditions Influencing Health Status) As An Associated Diagnosis?: No
Medical Necessity Clause: This procedure was medically necessary because the lesions that were treated were:
Consent: Written consent obtained and the risks of skin tag removal was reviewed with the patient including but not limited to bleeding, pigmentary change, infection, pain, and remote possibility of scarring.

## 2023-01-07 ENCOUNTER — HEALTH MAINTENANCE LETTER (OUTPATIENT)
Age: 53
End: 2023-01-07

## 2023-01-11 NOTE — PROGRESS NOTES
"[facility]  Rheumatology Clinic  Quique Gould MD  2023    Name: Braxton Ayala  MRN: [id number]  Age: 52 year old  : 1970  Referring provider: Darshan [facility]    Assessment and Plan:  # Limited ANCA-associated vasculitis: Sustained eye symptoms or signs have not recurred since . There are no extraocular symptoms. Exam is unchanged. Comprehensive metabolic panel, CBC, ANCA and urinalysis were all normal/negative in 2022.    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 I will screen for activity with Cr, UA, CBC, LFT.  In view of prior use of cyclophosphamide for 9 months at high dose, I recommend screening for cardiotoxicity with echocardiogram. If systemic disease remains quiet/inactive, if local/ocular flares can be controlled with short bursts of prednisone and topical Rx, and if no further transplantation is contemplated, he may continue \"watchful waiting\" off systemic immunosuppression. Continue with Protopic (tacrolimus) treatment daily and steroid (dexamethasone) as needed, per Ophthalmology.    Rituximab would be considered if graft-threatening eye disease recurs.    # Health maintenance: Patient remains normotensive, and reports regular use of lisinopril 40 mg daily.  I recommend regular monitoring of blood pressure and follow-up with primary care.  Blood pressure control target should be 130/80.     Plan:  1.  Check creatinine, liver function, CBC with platelets, urinalysis.  2.  Monitor for secondary malignancies associated with prior cyclophosphamide use by monitoring for systemic symptoms, and annual urinalysis.  Obtain age-appropriate cancer screening (e.g. colonoscopy at age 50).  3. Monitor BP at home weekly, record diastolic and systolic blood pressures and review blood pressure record with primary care to determine adequacy of antihypertensive therapy.  Goal 130/80. Check Echocardiogram.  4.  Continue follow-up with ophthalmology regarding regular " "topical use of prednisolone and Protopic.    Follow-up: Return in about 1 year    Quique Gould MD  Staff Rheumatologist, [facility]    Orders Placed This Encounter   Procedures     CRP inflammation     CBC with platelets     Comprehensive metabolic panel     Routine UA with microscopic - No culture (UMP)     Echocardiogram Complete       HPI:   Braxton Ayala has a history including limited ANCA-associated vasculitis who presents for follow-up. I last saw the patient 1-14-22, at which time the patient continued stable off of systemic immunosuppression without new symptoms or symptom progression. The plan was to proceed with watchful waiting.    Limited ANCA-associated vasculitis:  R corneal \"melt\" 2007, s/p 9 months of Cytoxan infusion completed 2008, s/p maintenance azathioprine through 9-10. Corneal graft failure and repeat transplant in 4-11; Rx MMF 1000 qd from 9-12 through 12-13    Interval history January 13, 2023    Patient saw Dr. Harmon in ophthalmology in May 2022.  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  Area of epithelialized 30 to 40% corneal thinning present but stable; stromal haze continues.  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  Protopic and prednisolone acetate drops were continued. He will see Ophtho again in 2-2023.    Today,No eye pain, watering, acuity is stable/improved.    he reports no bloody nasal drainage or phlegm.   +stable R hand 5th digit lancinating pain with altered sensation, starting in the R shoulder blade area.  No rash other than intermittent scaly patches on forearms in sun exposed areas.  Monitoring BP q month.  He describes some concern about heart toxicity secondary to his cyclophosphamide use in 2008.  He has lost 15 lbs in recent months \"partially intentionally\"  He works full-time, mostly onsite.    Interval history 1-  He did have an episode of right eye pain in March 2021.  Diagnosis " "through ophthalmology was epithelial abrasion, now improved and continuing to monitor.    Patient saw Dr. Harmon in ophthalmology in November 2021; status of right corneal transplant was thought to be stable.  No bloody nasal drainage or phlegm.   No eye pain, watering.  +stable R hand 5th digit lancinating pain with altered sensation.   No rash other than \"red dots\" blanchable; not itchy.  Monitoring BP q month.  Not exercising regularly. Does walk the dog, no DYSPNEA ON EXERTION.  COVID19 vaccinated and boosted.      Interval history 11-:  In January 2020, he developed a corneal ulcer.  He was treated in ophthalmology with tacrolimus, prednisolone topical therapy.  Symptoms and signs resolved as of follow-up visit with ophthalmology in March 2020.    He tested positive for COVI19 on 10-30-20 after workplace exposure. He had fever, cough, congestion for a few days. He had marked fatigue for 3 days. Tested negative 4 days later. Still wheezing, but energy level has returned. Treated with aspirin only during illness.    He had flu shot on 10-10; he had marked fever shortly afterwards.    His R eye occasionally gets \"irritated\"--he uses steroid drops. No further episodes of eye irritation since 3-2020.    No chest, abdomen, skin, joint, nerve symptoms recently.       Review of Systems:   Pertinent items are noted in HPI or as below, remainder of complete ROS is negative.      No recent problems with hearing or vision. No swallowing problems.   No breathing difficulty, shortness of breath, coughing, or wheezing.  No chest pain or palpitations.  No heart burn, indigestion, abdominal pain, nausea, vomiting, diarrhea.  No urination problems, no bloody, cloudy urine, no dysuria.  No numbing, tingling, weakness.  No headaches or confusion.  No rashes. No easy bleeding or bruising.     Active Medications:   Current Outpatient Medications   Medication Sig     acetaminophen (TYLENOL) 325 MG tablet Take 325-650 mg by " "mouth every 6 hours as needed     carboxymethylcellulose PF (REFRESH PLUS) 0.5 % ophthalmic solution 1 drop 3 times daily as needed     ibuprofen (ADVIL/MOTRIN) 200 MG tablet Take 200 mg by mouth every 4 hours as needed     lisinopril (PRINIVIL/ZESTRIL) 20 MG tablet Take 20 mg by mouth     prednisoLONE acetate (PRED FORTE) 1 % ophthalmic suspension Place 1 drop into the right eye daily     PROTOPIC 0.1 % ointment Apply topically daily in the right eye     No current facility-administered medications for this visit.     Allergies:  No known drug allergies.     Past Medical History:  ANCA associated vasculitis, limited presenting with corneal melt in 2008, s/p 9 months of cytoxan infusion finished in july 2008, started on Imuran 150mg/d (8/08) and prednisone was tapered off by the end of sept 2008, s/p corneal transplant graft R eye march 2009, developed Cataracts,s/p R eye cataracts surgery 3-10. Imuran tapered off 9-10. \"Rejection\" of graft 9-10 treated with oral prednisone and steroid drops. 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 New graft placed 4-11.   Hypertension  Nonsenile cataract  Corneal defect    Past Surgical History:  Right eye removal and replacement of suture  Corneal transplant, penetrating x2  Cataract IOL bilateral    Family History:    Family history reviewed. No pertinent family history.    Social History:  The patient reports that he has never smoked. He has never used smokeless tobacco. He reports current alcohol use. He reports that he does not use drugs.   [facility]  Marital Status:      Physical Exam:   /84   Pulse 80   Ht 1.803 m (5' 11\")   Wt 86.1 kg (189 lb 14.4 oz)   BMI 26.49 kg/m     Wt Readings from Last 4 Encounters:   01/13/23 86.1 kg (189 lb 14.4 oz)   01/14/22 87.6 kg (193 lb 3.2 oz)   11/22/19 83.9 kg (184 lb 14.4 oz)   09/14/18 86.2 kg (190 lb)     Constitutional: Well-developed, appearing stated age; cooperative.  Eyes: " Normal EOM, PERRLA, vision, conjunctiva, sclera. Unchanged sclerosis in the right cornea with ovoid iris.  ENT: Normal external ears, nose, hearing, lips, teeth, gums, throat. No mucous membrane lesions.  Neck: No mass or thyroid enlargement.  Resp: breathing unlabored  Skin: Multiple cherry angiomata over the anterior chest    Neuro: Normal cranial nerves  Psych: Normal judgement, orientation, memory, affect.     Laboratory:   RHEUM RESULTS Latest Ref Rng & Units 10/18/2007 10/19/2007 10/21/2007   ALBUMIN 3.4 - 5.0 g/dL - 4.7(H) -   ALT 0 - 70 U/L - 8 -   AST 0 - 45 U/L - 22 -   ANCA <1:20 - - -   CREATININE 0.66 - 1.25 mg/dL 1.28 - 1.09   CRP 0.0 - 8.0 mg/L - - -   GFR ESTIMATE, IF BLACK >60 mL/min/[1.73:m2] 82 - >90   GFR ESTIMATE >60 mL/min/1.73m2 68 - 82   HEMATOCRIT 40.0 - 53.0 % 45.2 46.0 39.1(L)   HEMOGLOBIN 13.3 - 17.7 g/dL 15.4 15.6 13.4    - 1620 mg/dL - - -   WBC 4.0 - 11.0 10e3/uL 10.3 13.3(H) 22.2(H)   RBC 4.40 - 5.90 10e6/uL 4.88 4.99 4.23(L)   RDW 10.0 - 15.0 % 13.1 13.0 12.9   MCHC 31.5 - 36.5 g/dL 34.1 34.0 34.3   MCV 78 - 100 fL 93 92 92   PLATELET COUNT 150 - 450 10e3/uL 327 357 303   ESR 0 - 15 mm/hr - - -     Neutrophil Cytoplasmic IgG Antibody   Date Value Ref Range Status   09/15/2017 <1:20 <1:20 Final     Comment:     (Note)  The ANCA IFA is <1:20; therefore, no further testing will   be performed.  INTERPRETIVE INFORMATION: Anti-Neutrophil Cyto Ab, IgG  Neutrophil Cytoplasmic Antibodies (C-ANCA = granular   cytoplasmic staining, P-ANCA = perinuclear staining) are   found in the serum of over 90 percent of patients with   certain necrotizing systemic vasculitides, and usually in   less than 5 percent of patients with collagen vascular   disease or arthritis.  Performed by [facility],  [address] [place] [id number] [phone]  [email or web address], Tramaine Allison MD, Lab. Director       IGG   Date Value Ref Range Status   06/24/2009 1220 695 - 1620 mg/dL Final

## 2023-01-13 ENCOUNTER — OFFICE VISIT (OUTPATIENT)
Dept: RHEUMATOLOGY | Facility: CLINIC | Age: 53
End: 2023-01-13
Attending: INTERNAL MEDICINE
Payer: COMMERCIAL

## 2023-01-13 ENCOUNTER — LAB (OUTPATIENT)
Dept: LAB | Facility: CLINIC | Age: 53
End: 2023-01-13
Payer: COMMERCIAL

## 2023-01-13 VITALS
HEIGHT: 71 IN | SYSTOLIC BLOOD PRESSURE: 118 MMHG | DIASTOLIC BLOOD PRESSURE: 84 MMHG | HEART RATE: 80 BPM | BODY MASS INDEX: 26.59 KG/M2 | WEIGHT: 189.9 LBS

## 2023-01-13 DIAGNOSIS — I77.82 ANCA-ASSOCIATED VASCULITIS (H): Primary | ICD-10-CM

## 2023-01-13 DIAGNOSIS — I77.82 ANCA-ASSOCIATED VASCULITIS (H): ICD-10-CM

## 2023-01-13 LAB
ALBUMIN SERPL BCG-MCNC: 4.3 G/DL (ref 3.5–5.2)
ALBUMIN UR-MCNC: 50 MG/DL
ALP SERPL-CCNC: 72 U/L (ref 40–129)
ALT SERPL W P-5'-P-CCNC: 25 U/L (ref 10–50)
ANION GAP SERPL CALCULATED.3IONS-SCNC: 8 MMOL/L (ref 7–15)
APPEARANCE UR: CLEAR
AST SERPL W P-5'-P-CCNC: 20 U/L (ref 10–50)
BILIRUB SERPL-MCNC: 0.3 MG/DL
BILIRUB UR QL STRIP: NEGATIVE
BUN SERPL-MCNC: 20.1 MG/DL (ref 6–20)
CALCIUM SERPL-MCNC: 9.4 MG/DL (ref 8.6–10)
CHLORIDE SERPL-SCNC: 106 MMOL/L (ref 98–107)
COLOR UR AUTO: YELLOW
CREAT SERPL-MCNC: 1.46 MG/DL (ref 0.67–1.17)
CRP SERPL-MCNC: <3 MG/L
DEPRECATED HCO3 PLAS-SCNC: 25 MMOL/L (ref 22–29)
ERYTHROCYTE [DISTWIDTH] IN BLOOD BY AUTOMATED COUNT: 12.3 % (ref 10–15)
GFR SERPL CREATININE-BSD FRML MDRD: 58 ML/MIN/1.73M2
GLUCOSE SERPL-MCNC: 100 MG/DL (ref 70–99)
GLUCOSE UR STRIP-MCNC: NEGATIVE MG/DL
HCT VFR BLD AUTO: 46 % (ref 40–53)
HGB BLD-MCNC: 15.9 G/DL (ref 13.3–17.7)
HGB UR QL STRIP: ABNORMAL
HYALINE CASTS: 3 /LPF
KETONES UR STRIP-MCNC: ABNORMAL MG/DL
LEUKOCYTE ESTERASE UR QL STRIP: ABNORMAL
MCH RBC QN AUTO: 31.6 PG (ref 26.5–33)
MCHC RBC AUTO-ENTMCNC: 34.6 G/DL (ref 31.5–36.5)
MCV RBC AUTO: 92 FL (ref 78–100)
MUCOUS THREADS #/AREA URNS LPF: PRESENT /LPF
NITRATE UR QL: NEGATIVE
PH UR STRIP: 6 [PH] (ref 5–7)
PLATELET # BLD AUTO: 482 10E3/UL (ref 150–450)
POTASSIUM SERPL-SCNC: 4.6 MMOL/L (ref 3.4–5.3)
PROT SERPL-MCNC: 7.4 G/DL (ref 6.4–8.3)
RBC # BLD AUTO: 5.03 10E6/UL (ref 4.4–5.9)
RBC URINE: <1 /HPF
SODIUM SERPL-SCNC: 139 MMOL/L (ref 136–145)
SP GR UR STRIP: 1.03 (ref 1–1.03)
UROBILINOGEN UR STRIP-MCNC: 2 MG/DL
WBC # BLD AUTO: 10.6 10E3/UL (ref 4–11)
WBC URINE: 3 /HPF

## 2023-01-13 PROCEDURE — 85027 COMPLETE CBC AUTOMATED: CPT | Performed by: PATHOLOGY

## 2023-01-13 PROCEDURE — 81001 URINALYSIS AUTO W/SCOPE: CPT | Performed by: PATHOLOGY

## 2023-01-13 PROCEDURE — 86140 C-REACTIVE PROTEIN: CPT | Performed by: PATHOLOGY

## 2023-01-13 PROCEDURE — 99213 OFFICE O/P EST LOW 20 MIN: CPT | Performed by: INTERNAL MEDICINE

## 2023-01-13 PROCEDURE — 36415 COLL VENOUS BLD VENIPUNCTURE: CPT | Performed by: PATHOLOGY

## 2023-01-13 PROCEDURE — G0463 HOSPITAL OUTPT CLINIC VISIT: HCPCS | Performed by: INTERNAL MEDICINE

## 2023-01-13 PROCEDURE — 80053 COMPREHEN METABOLIC PANEL: CPT | Performed by: PATHOLOGY

## 2023-01-13 ASSESSMENT — PAIN SCALES - GENERAL: PAINLEVEL: NO PAIN (0)

## 2023-01-13 NOTE — NURSING NOTE
"Chief Complaint   Patient presents with     RECHECK     Follow up with ANCA     /84   Pulse 80   Ht 1.803 m (5' 11\")   Wt 86.1 kg (189 lb 14.4 oz)   BMI 26.49 kg/m    Mendy Harrington CMA on 1/13/2023 at 7:30 AM    "

## 2023-01-13 NOTE — PATIENT INSTRUCTIONS
Diagnosis:  1. History of limited ANCA associated vasculitis with corneal and ocular involvement (2008): No recent symptoms that suggest recurrence of systemic inflammatory disease. No immune modulating medication is warranted.    Plan:  1.  Check creatinine, liver function, CBC with platelets, urinalysis.  2.  Monitor for secondary malignancies associated with prior cyclophosphamide use by monitoring for systemic symptoms, and annual urinalysis.  Obtain age-appropriate cancer screening (e.g. colonoscopy at age 50).  3. Monitor BP at home weekly, record diastolic and systolic blood pressures and review blood pressure record with primary care to determine adequacy of antihypertensive therapy.  Goal 130/80. Check Echocardiogram.  4.  Continue follow-up with ophthalmology regarding regular topical use of prednisolone and Protopic.

## 2023-01-13 NOTE — LETTER
"2023       RE: Braxton Ayala  [address] [address]     Dear Colleague,    Thank you for referring your patient, Braxton Ayala, to the [facility] RHEUMATOLOGY [facility]. Please see a copy of my visit note below.        [facility]  Rheumatology Clinic  Quique Gould MD  2023    Name: Braxton Ayala  MRN: [id number]  Age: 52 year old  : 1970  Referring provider: [facility]    Assessment and Plan:  # Limited ANCA-associated vasculitis: Sustained eye symptoms or signs have not recurred since . There are no extraocular symptoms. Exam is unchanged. Comprehensive metabolic panel, CBC, ANCA and urinalysis were all normal/negative in 2022.    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 I will screen for activity with Cr, UA, CBC, LFT.  In view of prior use of cyclophosphamide for 9 months at high dose, I recommend screening for cardiotoxicity with echocardiogram. If systemic disease remains quiet/inactive, if local/ocular flares can be controlled with short bursts of prednisone and topical Rx, and if no further transplantation is contemplated, he may continue \"watchful waiting\" off systemic immunosuppression. Continue with Protopic (tacrolimus) treatment daily and steroid (dexamethasone) as needed, per Ophthalmology.    Rituximab would be considered if graft-threatening eye disease recurs.    # Health maintenance: Patient remains normotensive, and reports regular use of lisinopril 40 mg daily.  I recommend regular monitoring of blood pressure and follow-up with primary care.  Blood pressure control target should be 130/80.     Plan:  1.  Check creatinine, liver function, CBC with platelets, urinalysis.  2.  Monitor for secondary malignancies associated with prior cyclophosphamide use by monitoring for systemic symptoms, and annual urinalysis.  Obtain age-appropriate cancer " "screening (e.g. colonoscopy at age 50).  3. Monitor BP at home weekly, record diastolic and systolic blood pressures and review blood pressure record with primary care to determine adequacy of antihypertensive therapy.  Goal 130/80. Check Echocardiogram.  4.  Continue follow-up with ophthalmology regarding regular topical use of prednisolone and Protopic.    Follow-up: Return in about 1 year    Quique Gould MD  Staff Rheumatologist,  Health    Orders Placed This Encounter   Procedures     CRP inflammation     CBC with platelets     Comprehensive metabolic panel     Routine UA with microscopic - No culture ([facility])     Echocardiogram Complete       HPI:   Braxton Ayala has a history including limited ANCA-associated vasculitis who presents for follow-up. I last saw the patient 1-14-22, at which time the patient continued stable off of systemic immunosuppression without new symptoms or symptom progression. The plan was to proceed with watchful waiting.    Limited ANCA-associated vasculitis:  R corneal \"melt\" 2007, s/p 9 months of Cytoxan infusion completed 2008, s/p maintenance azathioprine through 9-10. Corneal graft failure and repeat transplant in 4-11; Rx MMF 1000 qd from 9-12 through 12-13    Interval history January 13, 2023    Patient saw Dr. Harmon in ophthalmology in May 2022.  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  Area of epithelialized 30 to 40% corneal thinning present but stable; stromal haze continues.  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  Protopic and prednisolone acetate drops were continued. He will see Ophtho again in 2-2023.    Today,No eye pain, watering, acuity is stable/improved.    he reports no bloody nasal drainage or phlegm.   +stable R hand 5th digit lancinating pain with altered sensation, starting in the R shoulder blade area.  No rash other than intermittent scaly patches on forearms in sun exposed " "areas.  Monitoring BP q month.  He describes some concern about heart toxicity secondary to his cyclophosphamide use in 2008.  He has lost 15 lbs in recent months \"partially intentionally\"  He works full-time, mostly onsite.    Interval history 1-  He did have an episode of right eye pain in March 2021.  Diagnosis through ophthalmology was epithelial abrasion, now improved and continuing to monitor.    Patient saw Dr. Harmon in ophthalmology in November 2021; status of right corneal transplant was thought to be stable.  No bloody nasal drainage or phlegm.   No eye pain, watering.  +stable R hand 5th digit lancinating pain with altered sensation.   No rash other than \"red dots\" blanchable; not itchy.  Monitoring BP q month.  Not exercising regularly. Does walk the dog, no DYSPNEA ON EXERTION.  COVID19 vaccinated and boosted.      Interval history 11-:  In January 2020, he developed a corneal ulcer.  He was treated in ophthalmology with tacrolimus, prednisolone topical therapy.  Symptoms and signs resolved as of follow-up visit with ophthalmology in March 2020.    He tested positive for COVI19 on 10-30-20 after workplace exposure. He had fever, cough, congestion for a few days. He had marked fatigue for 3 days. Tested negative 4 days later. Still wheezing, but energy level has returned. Treated with aspirin only during illness.    He had flu shot on 10-10; he had marked fever shortly afterwards.    His R eye occasionally gets \"irritated\"--he uses steroid drops. No further episodes of eye irritation since 3-2020.    No chest, abdomen, skin, joint, nerve symptoms recently.       Review of Systems:   Pertinent items are noted in HPI or as below, remainder of complete ROS is negative.      No recent problems with hearing or vision. No swallowing problems.   No breathing difficulty, shortness of breath, coughing, or wheezing.  No chest pain or palpitations.  No heart burn, indigestion, abdominal pain, " "nausea, vomiting, diarrhea.  No urination problems, no bloody, cloudy urine, no dysuria.  No numbing, tingling, weakness.  No headaches or confusion.  No rashes. No easy bleeding or bruising.     Active Medications:   Current Outpatient Medications   Medication Sig     acetaminophen (TYLENOL) 325 MG tablet Take 325-650 mg by mouth every 6 hours as needed     carboxymethylcellulose PF (REFRESH PLUS) 0.5 % ophthalmic solution 1 drop 3 times daily as needed     ibuprofen (ADVIL/MOTRIN) 200 MG tablet Take 200 mg by mouth every 4 hours as needed     lisinopril (PRINIVIL/ZESTRIL) 20 MG tablet Take 20 mg by mouth     prednisoLONE acetate (PRED FORTE) 1 % ophthalmic suspension Place 1 drop into the right eye daily     PROTOPIC 0.1 % ointment Apply topically daily in the right eye     No current facility-administered medications for this visit.     Allergies:  No known drug allergies.     Past Medical History:  ANCA associated vasculitis, limited presenting with corneal melt in 2008, s/p 9 months of cytoxan infusion finished in july 2008, started on Imuran 150mg/d (8/08) and prednisone was tapered off by the end of sept 2008, s/p corneal transplant graft R eye march 2009, developed Cataracts,s/p R eye cataracts surgery 3-10. Imuran tapered off 9-10. \"Rejection\" of graft 9-10 treated with oral prednisone and steroid drops. 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 New graft placed 4-11.   Hypertension  Nonsenile cataract  Corneal defect    Past Surgical History:  Right eye removal and replacement of suture  Corneal transplant, penetrating x2  Cataract IOL bilateral    Family History:    Family history reviewed. No pertinent family history.    Social History:  The patient reports that he has never smoked. He has never used smokeless tobacco. He reports current alcohol use. He reports that he does not use drugs.   PCP: Darshan Verdugo  Marital Status:      Physical Exam:   /84   Pulse 80  " " Ht 1.803 m (5' 11\")   Wt 86.1 kg (189 lb 14.4 oz)   BMI 26.49 kg/m     Wt Readings from Last 4 Encounters:   01/13/23 86.1 kg (189 lb 14.4 oz)   01/14/22 87.6 kg (193 lb 3.2 oz)   11/22/19 83.9 kg (184 lb 14.4 oz)   09/14/18 86.2 kg (190 lb)     Constitutional: Well-developed, appearing stated age; cooperative.  Eyes: Normal EOM, PERRLA, vision, conjunctiva, sclera. Unchanged sclerosis in the right cornea with ovoid iris.  ENT: Normal external ears, nose, hearing, lips, teeth, gums, throat. No mucous membrane lesions.  Neck: No mass or thyroid enlargement.  Resp: breathing unlabored  Skin: Multiple cherry angiomata over the anterior chest    Neuro: Normal cranial nerves  Psych: Normal judgement, orientation, memory, affect.     Laboratory:   RHEUM RESULTS Latest Ref Rng & Units 10/18/2007 10/19/2007 10/21/2007   ALBUMIN 3.4 - 5.0 g/dL - 4.7(H) -   ALT 0 - 70 U/L - 8 -   AST 0 - 45 U/L - 22 -   ANCA <1:20 - - -   CREATININE 0.66 - 1.25 mg/dL 1.28 - 1.09   CRP 0.0 - 8.0 mg/L - - -   GFR ESTIMATE, IF BLACK >60 mL/min/[1.73:m2] 82 - >90   GFR ESTIMATE >60 mL/min/1.73m2 68 - 82   HEMATOCRIT 40.0 - 53.0 % 45.2 46.0 39.1(L)   HEMOGLOBIN 13.3 - 17.7 g/dL 15.4 15.6 13.4    - 1620 mg/dL - - -   WBC 4.0 - 11.0 10e3/uL 10.3 13.3(H) 22.2(H)   RBC 4.40 - 5.90 10e6/uL 4.88 4.99 4.23(L)   RDW 10.0 - 15.0 % 13.1 13.0 12.9   MCHC 31.5 - 36.5 g/dL 34.1 34.0 34.3   MCV 78 - 100 fL 93 92 92   PLATELET COUNT 150 - 450 10e3/uL 327 357 303   ESR 0 - 15 mm/hr - - -     Neutrophil Cytoplasmic IgG Antibody   Date Value Ref Range Status   09/15/2017 <1:20 <1:20 Final     Comment:     (Note)  The ANCA IFA is <1:20; therefore, no further testing will   be performed.  INTERPRETIVE INFORMATION: Anti-Neutrophil Cyto Ab, IgG  Neutrophil Cytoplasmic Antibodies (C-ANCA = granular   cytoplasmic staining, P-ANCA = perinuclear staining) are   found in the serum of over 90 percent of patients with   certain necrotizing systemic vasculitides, " and usually in   less than 5 percent of patients with collagen vascular   disease or arthritis.  Performed by [facility],  [address] [place] [id number] [phone]  [email or web address], Tramaine Allison MD, Lab. Director       IGG   Date Value Ref Range Status   06/24/2009 1220 695 - 1620 mg/dL Final       Again, thank you for allowing me to participate in the care of your patient.      Sincerely,    Quique Gould MD

## 2023-01-27 ENCOUNTER — LAB (OUTPATIENT)
Dept: LAB | Facility: CLINIC | Age: 53
End: 2023-01-27
Payer: COMMERCIAL

## 2023-01-27 DIAGNOSIS — I77.82 ANCA-ASSOCIATED VASCULITIS (H): ICD-10-CM

## 2023-01-27 DIAGNOSIS — H16.011 CENTRAL CORNEAL ULCER OF RIGHT EYE: ICD-10-CM

## 2023-01-27 LAB
ALBUMIN UR-MCNC: NEGATIVE MG/DL
APPEARANCE UR: CLEAR
BACTERIA #/AREA URNS HPF: NORMAL /HPF
BILIRUB UR QL STRIP: NEGATIVE
COLOR UR AUTO: YELLOW
CREAT SERPL-MCNC: 1.25 MG/DL (ref 0.67–1.17)
GFR SERPL CREATININE-BSD FRML MDRD: 70 ML/MIN/1.73M2
GLUCOSE UR STRIP-MCNC: NEGATIVE MG/DL
HGB UR QL STRIP: NEGATIVE
KETONES UR STRIP-MCNC: NEGATIVE MG/DL
LEUKOCYTE ESTERASE UR QL STRIP: NEGATIVE
NITRATE UR QL: NEGATIVE
PH UR STRIP: 5.5 [PH] (ref 5–8)
RBC #/AREA URNS AUTO: NORMAL /HPF
SP GR UR STRIP: 1.02 (ref 1–1.03)
UROBILINOGEN UR STRIP-ACNC: 0.2 E.U./DL
WBC #/AREA URNS AUTO: NORMAL /HPF

## 2023-01-27 PROCEDURE — 82565 ASSAY OF CREATININE: CPT

## 2023-01-27 PROCEDURE — 81001 URINALYSIS AUTO W/SCOPE: CPT

## 2023-01-27 PROCEDURE — 36415 COLL VENOUS BLD VENIPUNCTURE: CPT

## 2023-04-13 ENCOUNTER — OFFICE VISIT (OUTPATIENT)
Dept: OPHTHALMOLOGY | Facility: CLINIC | Age: 53
End: 2023-04-13
Attending: OPHTHALMOLOGY
Payer: COMMERCIAL

## 2023-04-13 DIAGNOSIS — Z94.7 HISTORY OF PENETRATING KERATOPLASTY: Primary | ICD-10-CM

## 2023-04-13 DIAGNOSIS — H17.9 CORNEAL SCAR AND OPACITY: ICD-10-CM

## 2023-04-13 PROCEDURE — G0463 HOSPITAL OUTPT CLINIC VISIT: HCPCS | Performed by: OPHTHALMOLOGY

## 2023-04-13 PROCEDURE — 99213 OFFICE O/P EST LOW 20 MIN: CPT | Mod: GC | Performed by: OPHTHALMOLOGY

## 2023-04-13 RX ORDER — TACROLIMUS 0.1 %
OINTMENT (GRAM) TOPICAL
Qty: 60 G | Refills: 4 | Status: SHIPPED | OUTPATIENT
Start: 2023-04-13 | End: 2023-05-17

## 2023-04-13 RX ORDER — PREDNISOLONE ACETATE 10 MG/ML
1 SUSPENSION/ DROPS OPHTHALMIC EVERY OTHER DAY
Qty: 15 ML | Refills: 3 | Status: SHIPPED | OUTPATIENT
Start: 2023-04-13 | End: 2023-09-19

## 2023-04-13 ASSESSMENT — CONF VISUAL FIELD
OS_SUPERIOR_TEMPORAL_RESTRICTION: 0
OS_NORMAL: 1
OS_INFERIOR_TEMPORAL_RESTRICTION: 0
OD_INFERIOR_TEMPORAL_RESTRICTION: 3
OD_SUPERIOR_TEMPORAL_RESTRICTION: 3
OS_INFERIOR_NASAL_RESTRICTION: 0
OS_SUPERIOR_NASAL_RESTRICTION: 0

## 2023-04-13 ASSESSMENT — REFRACTION_WEARINGRX
OS_CYLINDER: +1.00
OS_AXIS: 087
SPECS_TYPE: SVL
OD_SPHERE: BALANCE
OS_SPHERE: -3.00

## 2023-04-13 ASSESSMENT — TONOMETRY
IOP_METHOD: ICARE
OS_IOP_MMHG: 14
OD_IOP_MMHG: 8

## 2023-04-13 ASSESSMENT — EXTERNAL EXAM - LEFT EYE: OS_EXAM: NORMAL

## 2023-04-13 ASSESSMENT — VISUAL ACUITY
CORRECTION_TYPE: GLASSES
OD_SC+: -1
OS_CC: 20/15
OD_SC: 20/25
METHOD: SNELLEN - LINEAR

## 2023-04-13 ASSESSMENT — SLIT LAMP EXAM - LIDS
COMMENTS: NORMAL
COMMENTS: NORMAL

## 2023-04-13 ASSESSMENT — EXTERNAL EXAM - RIGHT EYE: OD_EXAM: NORMAL

## 2023-04-13 NOTE — PROGRESS NOTES
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     Interval history: Feels very well, no pain or irritation in either eye. Very happy with vision at this point. No flashes, floaters, curtains. No recent flares from systemic or ocular standpoint.    POHx:  Granulomatosis polyangiitis/Marylin's  Corneal/Scleral Melts requiring patch grafts     Per Dr. 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 If systemic disease remains quiet, if local/ocular flares can be controlled with short bursts of prednisone and topical Rx, and if no further transplantation is contemplated, he may continue off systemic immunosuppression. 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 12/2007) ([facility])  - Initial PKP ~1990's per patient    Assessment & Plan:  # Neurotrophic keratopathy right eye  - epi defect 3/12/21 - RESOLVED s/p prokera placement  - sensation intact but reduced right eye    Plan:  - Continue PFATS Celluvisc 4-6x/day (or more) or similar  - Continue Protopic at bedtime  - Continue prednisolone acetate daily to QOD  - recommended dilated exam with optometrist    # H/o penetrating keratoplasty (PK) (1/17/2008), Right Eye: from Granulomatosis w/ Polyangiitis   - most recent flare up was October 2016 (prior to that was January 2014)  - episcleral/scleral inflammation resolved, stromal haze " continues, area of epithelialized 30-40% corneal thinning present prior (small round inferonasal) stable  -cut exposed suture tip.    #. Granulomatosis w/ polyangiitis: follows Rheumatology at the [facility]  - off systemic immunosuppression, continue f/u with rheum    # Pseudophakia, Right Eye  - Stable, monitor .    RTC 1 year, v/t or sooner if worsening sx    Juvenal Farley MD  Fellow, Cornea, External Disease and Refractive Surgery  Department of Ophthalmology  [facility]    Attending Physician Attestation:  Complete documentation of historical and exam elements from today's encounter can be found in the full encounter summary report (not reduplicated in this progress note).  I personally obtained the chief complaint(s) and history of present illness.  I confirmed and edited as necessary the review of systems, past medical/surgical history, family history, social history, and examination findings as documented by others; and I examined the patient myself.  I personally reviewed the relevant tests, images, and reports as documented above.  I formulated and edited as necessary the assessment and plan and discussed the findings and management plan with the patient and family. - Andriy Harmon MD

## 2023-04-13 NOTE — NURSING NOTE
"Chief Complaints and History of Present Illnesses   Patient presents with     Cornea Transplant Follow Up     Follow up for PK right eye.   Patient notes vision is stable each eye in the last several months. Denies pain each eye. Patient states compliant with eye medications.        Chief Complaint(s) and History of Present Illness(es)     Cornea Transplant Follow Up            Laterality: right eye    Onset: months ago    Associated symptoms: Negative for eye pain, redness, tearing and photophobia    Treatments tried: eye drops, artificial tears and glasses    Pain scale: 0/10    Comments: Follow up for PK right eye.   Patient notes vision is stable each eye in the last several months. Denies pain each eye. Patient states compliant with eye medications.             Comments    Ocular meds:   - Prednisolone every other day right eye   - Protopic at bedtime right eye   - Celluvisc 4-6x/day right eye, \"not as often now\"     Hina Ndiaye, COT 7:54 AM 04/13/2023                   "

## 2023-04-14 ENCOUNTER — TELEPHONE (OUTPATIENT)
Dept: OPHTHALMOLOGY | Facility: CLINIC | Age: 53
End: 2023-04-14
Payer: COMMERCIAL

## 2023-04-14 NOTE — TELEPHONE ENCOUNTER
Prior Authorization Retail Medication Request    Medication/Dose:   ICD code (if different than what is on RX):    Previously Tried and Failed:    Rationale:      Insurance Name:    Insurance ID:        Pharmacy Information (if different than what is on RX)  Name:    Phone:      See Chart

## 2023-05-08 NOTE — TELEPHONE ENCOUNTER
Central Prior Authorization Team   Phone: [phone]      PA Initiation    Medication: PROTOPIC 0.1% Ointment 60GM  Insurance Company: [employer] - Phone [phone] Fax [phone]  Pharmacy Filling the Rx: [id number] [place], [place] - [facility] [facility] [place] [facility]  Filling Pharmacy Phone: [phone]  Filling Pharmacy Fax:    Start Date: 5/8/2023

## 2023-05-10 NOTE — TELEPHONE ENCOUNTER
Spoke to pt at [phone]    Pt comfortable using good Rx -- maybe around 55 dollars per review on good Rx and pt aware may be different when pharmacy runs Rx    Pt also aware would not be able to reimburse if insurance in end able to cover medication.      Reviewed would forward to cornea team/in clinic PA personnel to assist in letter of appeal    Pt has direct number if needs any further assistance.    Juan Ledezma RN 5:20 PM 05/10/23

## 2023-05-10 NOTE — TELEPHONE ENCOUNTER
PRIOR AUTHORIZATION DENIED    Medication: PROTOPIC 0.1% Ointment 60GM    Denial Date: 5/10/2023    Denial Rational:           Appeal Information:

## 2023-05-15 ENCOUNTER — TELEPHONE (OUTPATIENT)
Dept: OPHTHALMOLOGY | Facility: CLINIC | Age: 53
End: 2023-05-15
Payer: COMMERCIAL

## 2023-05-15 DIAGNOSIS — H17.9 CORNEAL SCAR AND OPACITY: ICD-10-CM

## 2023-05-15 DIAGNOSIS — Z94.7 HISTORY OF PENETRATING KERATOPLASTY: ICD-10-CM

## 2023-05-15 NOTE — TELEPHONE ENCOUNTER
[employer] called noting that the patient's Protopic ointment would be approved if the generic is okay for patient.  Brand name would be denied.  Fax sent to us.

## 2023-05-17 RX ORDER — TACROLIMUS 1 MG/G
OINTMENT TOPICAL
Qty: 30 G | Refills: 4 | Status: SHIPPED | OUTPATIENT
Start: 2023-05-17

## 2023-05-17 NOTE — TELEPHONE ENCOUNTER
Ok for generic 0.1% tacrolimus per Cornea team    Resent Rx without dispense as written for Brand Name Tacrolimus-- Generic sent and should go thru insurance if in generic form.    Juan Ledezma RN 8:43 AM 05/17/23

## 2023-09-17 ENCOUNTER — OFFICE VISIT (OUTPATIENT)
Dept: OPHTHALMOLOGY | Facility: CLINIC | Age: 53
End: 2023-09-17
Payer: COMMERCIAL

## 2023-09-17 DIAGNOSIS — H16.011 CENTRAL CORNEAL ULCER OF RIGHT EYE: Primary | ICD-10-CM

## 2023-09-17 LAB
KOH PREPARATION: NORMAL
KOH PREPARATION: NORMAL

## 2023-09-17 PROCEDURE — 87252 VIRUS INOCULATION TISSUE: CPT | Mod: 90

## 2023-09-17 PROCEDURE — 87205 SMEAR GRAM STAIN: CPT

## 2023-09-17 PROCEDURE — 87186 SC STD MICRODIL/AGAR DIL: CPT

## 2023-09-17 PROCEDURE — 87077 CULTURE AEROBIC IDENTIFY: CPT

## 2023-09-17 PROCEDURE — 99000 SPECIMEN HANDLING OFFICE-LAB: CPT

## 2023-09-17 PROCEDURE — 87070 CULTURE OTHR SPECIMN AEROBIC: CPT

## 2023-09-17 PROCEDURE — 87102 FUNGUS ISOLATION CULTURE: CPT

## 2023-09-17 PROCEDURE — 87075 CULTR BACTERIA EXCEPT BLOOD: CPT

## 2023-09-17 PROCEDURE — 99207 PR SERVICE NOT STAFFED W/SUPERV PROV: CPT

## 2023-09-17 PROCEDURE — 87210 SMEAR WET MOUNT SALINE/INK: CPT

## 2023-09-17 RX ORDER — VALACYCLOVIR HYDROCHLORIDE 1 G/1
1000 TABLET, FILM COATED ORAL 3 TIMES DAILY
Qty: 30 TABLET | Refills: 0 | Status: SHIPPED | OUTPATIENT
Start: 2023-09-17 | End: 2024-03-01

## 2023-09-17 ASSESSMENT — CONF VISUAL FIELD
OS_INFERIOR_NASAL_RESTRICTION: 0
OD_SUPERIOR_NASAL_RESTRICTION: 1
OD_INFERIOR_NASAL_RESTRICTION: 1
OS_NORMAL: 1
OS_INFERIOR_TEMPORAL_RESTRICTION: 0
OS_SUPERIOR_TEMPORAL_RESTRICTION: 0
OS_SUPERIOR_NASAL_RESTRICTION: 0

## 2023-09-17 ASSESSMENT — VISUAL ACUITY
OD_SC: CF
METHOD: SNELLEN - LINEAR
OS_CC: 20/20

## 2023-09-17 ASSESSMENT — TONOMETRY
IOP_METHOD: TONOPEN
OD_IOP_MMHG: 16
OS_IOP_MMHG: 16

## 2023-09-17 ASSESSMENT — SLIT LAMP EXAM - LIDS
COMMENTS: NORMAL
COMMENTS: NORMAL

## 2023-09-17 ASSESSMENT — EXTERNAL EXAM - LEFT EYE: OS_EXAM: NORMAL

## 2023-09-17 ASSESSMENT — EXTERNAL EXAM - RIGHT EYE: OD_EXAM: NORMAL

## 2023-09-17 NOTE — PROGRESS NOTES
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     Interval history: He thinks his anterior chamber is very shallow with red vessels and very poor vision. He noticed this seemingly sudden change when he woke up this morning. For the last month his right eye vision has been somewhat off but nothing too concerning to him. Left eye unaffected. No fever, fatigue, dyspnea, nose bleeds.     No gush of fluid, no trauma, no water exposure. Spouse notes right eye was squinting more than normal yesterday.     POHx:  Granulomatosis polyangiitis/Marylin's  Corneal/Scleral Melts requiring patch grafts     Per Dr. 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 I will screen for activity with Cr, UA, CBC, LFT.  In view of prior use of cyclophosphamide for 9 months at high dose, I recommend screening for cardiotoxicity with echocardiogram. If systemic disease remains quiet/inactive, if local/ocular flares can be controlled with short bursts of prednisone and topical Rx, and if no further transplantation is contemplated, he may continue \"watchful waiting\" off systemic immunosuppression. Continue with Protopic (tacrolimus) treatment daily and steroid (dexamethasone) as needed, per Ophthalmology.     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 Reassuringly there were no signs of perforation including formed AC, olaf negative and IOP of 16. Collected cultures and started fortified tobramycin and vancomycin today 9/17/23. No known vegetative or water exposure.     Plan:  - Continue PFATS Celluvisc 4-6x/day (or more) or similar  - Continue 0.1% tacrolimus at bedtime  - Continue prednisolone acetate daily to QOD  - Following cultures collected 9/17/23 aerobic, anaerobic, gram stain, fungal.  - Start fortified tobramycin and vancomycin q1hr round the clock   - Valtrex 1g TID to cover for HSV  - B scan was not completed at today's visit and patient was discharged to  eye drops before B scan could be completed in error by [place] resident, this will need to be completed at follow up 9/18/23 as there was no view to vitreous cavity with corneal pathology.     # H/o penetrating keratoplasty (PK) (1/17/2008), Right Eye: from Granulomatosis w/ Polyangiitis   - most recent flare up was October 2016 (prior to that was January 2014)  - Area of epithelialized 30-40% corneal thinning present prior (small round inferonasal) stable    #. Granulomatosis w/ polyangiitis: follows Rheumatology at the [facility] last seen 1/2023 as above. No systemic symptoms today  - off systemic immunosuppression, continue f/u with rheum    # Pseudophakia, Right Eye  - Stable, monitor .    RTC tomorrow 9/18/23 for VT and B-scan right eye (message sent to clinic staff to assist with scheduling)    Patient seen with Dr. Weir and plan discussed with Dr. Cynthia Hendrickson MD  Resident Physician, PGY-2  Department of Ophthalmology    Attending Physician Attestation:  I did not see this patient on Sep 17, 2023, but I have reviewed the relevant history, examination  findings, assessment, and plan as documented by others.  I have confirmed and edited as necessary the assessment and plan and agree with this note.  - Rahel Marie MD 3:44 PM 9/19/2023

## 2023-09-18 ENCOUNTER — LAB (OUTPATIENT)
Dept: LAB | Facility: CLINIC | Age: 53
End: 2023-09-18
Payer: COMMERCIAL

## 2023-09-18 ENCOUNTER — OFFICE VISIT (OUTPATIENT)
Dept: OPHTHALMOLOGY | Facility: CLINIC | Age: 53
End: 2023-09-18
Attending: OPHTHALMOLOGY
Payer: COMMERCIAL

## 2023-09-18 DIAGNOSIS — H17.9 CORNEAL SCAR AND OPACITY: ICD-10-CM

## 2023-09-18 DIAGNOSIS — H16.011 CENTRAL CORNEAL ULCER OF RIGHT EYE: ICD-10-CM

## 2023-09-18 DIAGNOSIS — Z94.7 HISTORY OF PENETRATING KERATOPLASTY: ICD-10-CM

## 2023-09-18 DIAGNOSIS — H16.011 CENTRAL CORNEAL ULCER OF RIGHT EYE: Primary | ICD-10-CM

## 2023-09-18 PROCEDURE — 86038 ANTINUCLEAR ANTIBODIES: CPT | Performed by: OPHTHALMOLOGY

## 2023-09-18 PROCEDURE — 86225 DNA ANTIBODY NATIVE: CPT | Performed by: OPHTHALMOLOGY

## 2023-09-18 PROCEDURE — 36415 COLL VENOUS BLD VENIPUNCTURE: CPT | Performed by: PATHOLOGY

## 2023-09-18 PROCEDURE — 86431 RHEUMATOID FACTOR QUANT: CPT | Performed by: OPHTHALMOLOGY

## 2023-09-18 PROCEDURE — 99000 SPECIMEN HANDLING OFFICE-LAB: CPT | Performed by: PATHOLOGY

## 2023-09-18 PROCEDURE — 76512 OPH US DX B-SCAN: CPT | Performed by: OPHTHALMOLOGY

## 2023-09-18 PROCEDURE — 92285 EXTERNAL OCULAR PHOTOGRAPHY: CPT | Performed by: OPHTHALMOLOGY

## 2023-09-18 PROCEDURE — G0463 HOSPITAL OUTPT CLINIC VISIT: HCPCS | Performed by: OPHTHALMOLOGY

## 2023-09-18 PROCEDURE — 99214 OFFICE O/P EST MOD 30 MIN: CPT | Performed by: OPHTHALMOLOGY

## 2023-09-18 PROCEDURE — 86037 ANCA TITER EACH ANTIBODY: CPT | Performed by: OPHTHALMOLOGY

## 2023-09-18 RX ORDER — METHYLPREDNISOLONE 4 MG
TABLET, DOSE PACK ORAL
Qty: 21 TABLET | Refills: 0 | Status: SHIPPED | OUTPATIENT
Start: 2023-09-18 | End: 2024-03-01

## 2023-09-18 ASSESSMENT — SLIT LAMP EXAM - LIDS
COMMENTS: NORMAL
COMMENTS: NORMAL

## 2023-09-18 ASSESSMENT — VISUAL ACUITY
OS_CC: 20/20
METHOD: SNELLEN - LINEAR
OD_CC: CF

## 2023-09-18 ASSESSMENT — TONOMETRY
OS_IOP_MMHG: 18
IOP_METHOD: ICARE
OD_IOP_MMHG: 12

## 2023-09-18 ASSESSMENT — EXTERNAL EXAM - LEFT EYE: OS_EXAM: NORMAL

## 2023-09-18 ASSESSMENT — EXTERNAL EXAM - RIGHT EYE: OD_EXAM: NORMAL

## 2023-09-18 NOTE — NURSING NOTE
Chief Complaints and History of Present Illnesses   Patient presents with    Follow Up     Central corneal ulcer of right eye         Chief Complaint(s) and History of Present Illness(es)       Follow Up              Comments: Central corneal ulcer of right eye                  Comments    Pt states that the right eye feels better today   States 2/10 on the pain scale  Some tearing, and itching right eye     Greta Elias COT 9:36 AM September 18, 2023

## 2023-09-18 NOTE — PROGRESS NOTES
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     Interval history: He thinks his anterior chamber is very shallow with red vessels and very poor vision. He noticed this seemingly sudden change when he woke up this morning. For the last month his right eye vision has been somewhat off but nothing too concerning to him. Left eye unaffected. No fever, fatigue, dyspnea, nose bleeds.     No gush of fluid, no trauma, no water exposure. Spouse notes right eye was squinting more than normal yesterday.     POHx:  Granulomatosis polyangiitis/Marylin's  Corneal/Scleral Melts requiring patch grafts     Per Dr. 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 I will screen for activity with Cr, UA, CBC, LFT.  In view of prior use of cyclophosphamide for 9 months at high dose, I recommend screening for cardiotoxicity with echocardiogram. If systemic disease remains quiet/inactive, if local/ocular flares can be controlled with short bursts of prednisone and topical Rx, and if no further transplantation is contemplated, he may continue \"watchful waiting\" off systemic immunosuppression. Continue with Protopic (tacrolimus) treatment daily and steroid (dexamethasone) as needed, per Ophthalmology.     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 Collected cultures and started fortified tobramycin and vancomycin today 9/17/23. No known vegetative or water exposure.   -ultrasound today, no signs of endophthalmitis     Exam today shows epi defect with associated graft rejection. Possibly related to Granulomatosis. Patient had also nose bleeding.    # H/o penetrating keratoplasty (PK) (1/17/2008), Right Eye: from Granulomatosis w/ Polyangiitis   - most recent flare up was October 2016 (prior to that was January 2014)  - Area of epithelialized 30-40% corneal thinning present prior (small round inferonasal) stable    #. Granulomatosis w/ polyangiitis: follows Rheumatology at the [facility] last seen 1/2023 as above. No systemic symptoms today  - off systemic immunosuppression, continue f/u with rheum  -previously had cyclophosphamide      # Pseudophakia, Right Eye  - Stable, monitor .      Plan:  -patient is showing signs of corneal rejection with an associated epi defect. Infection couldn't be ruled out.   -decrease vanco and tobra to q2 hours  -continue pred QID, consider increase to q1h tomorrow.  -Start medrol dose pack  - Continue PFATS Celluvisc 4-6x/day (or more) or similar  - Continue 0.1% tacrolimus at bedtime  - continue Valtrex 1g TID to cover for HSV    RTC tomorrow 9/18/23 for VT and B-scan right eye (message sent to clinic staff to assist with scheduling)    Attending Physician Attestation:  Complete documentation of historical and exam elements from today's encounter can be found in the full encounter summary report (not reduplicated in this progress note).  I personally obtained the chief complaint(s) and history of present illness.  I confirmed and edited as necessary the review of systems, past medical/surgical history, family history, social history, and examination  findings as documented by others; and I examined the patient myself.  I personally reviewed the relevant tests, images, and reports as documented above.  I formulated and edited as necessary the assessment and plan and discussed the findings and management plan with the patient and family. - Remy Hsieh MD

## 2023-09-19 ENCOUNTER — OFFICE VISIT (OUTPATIENT)
Dept: OPHTHALMOLOGY | Facility: CLINIC | Age: 53
End: 2023-09-19
Attending: OPHTHALMOLOGY
Payer: COMMERCIAL

## 2023-09-19 DIAGNOSIS — T86.91 GRAFT REJECTION, IMMUNOLOGIC: Primary | ICD-10-CM

## 2023-09-19 DIAGNOSIS — Z94.7 HISTORY OF PENETRATING KERATOPLASTY: ICD-10-CM

## 2023-09-19 LAB
ANA SER QL IF: NEGATIVE
ANCA AB PATTERN SER IF-IMP: NORMAL
C-ANCA TITR SER IF: NORMAL {TITER}
DSDNA AB SER-ACNC: 1.1 IU/ML
RHEUMATOID FACT SER NEPH-ACNC: <6 IU/ML

## 2023-09-19 PROCEDURE — 99214 OFFICE O/P EST MOD 30 MIN: CPT | Mod: GC | Performed by: OPHTHALMOLOGY

## 2023-09-19 PROCEDURE — G0463 HOSPITAL OUTPT CLINIC VISIT: HCPCS | Performed by: OPHTHALMOLOGY

## 2023-09-19 RX ORDER — PREDNISOLONE ACETATE 10 MG/ML
1 SUSPENSION/ DROPS OPHTHALMIC
Qty: 15 ML | Refills: 11 | Status: SHIPPED | OUTPATIENT
Start: 2023-09-19

## 2023-09-19 RX ORDER — OFLOXACIN 3 MG/ML
1-2 SOLUTION/ DROPS OPHTHALMIC 4 TIMES DAILY
Qty: 10 ML | Refills: 1 | Status: SHIPPED | OUTPATIENT
Start: 2023-09-19 | End: 2024-03-01

## 2023-09-19 ASSESSMENT — TONOMETRY
OS_IOP_MMHG: 14
OD_IOP_MMHG: 9
IOP_METHOD: ICARE

## 2023-09-19 ASSESSMENT — CONF VISUAL FIELD
OS_NORMAL: 1
OS_INFERIOR_TEMPORAL_RESTRICTION: 0
OS_SUPERIOR_NASAL_RESTRICTION: 0
OS_SUPERIOR_TEMPORAL_RESTRICTION: 0
OD_SUPERIOR_NASAL_RESTRICTION: 1
OS_INFERIOR_NASAL_RESTRICTION: 0
METHOD: COUNTING FINGERS
OD_SUPERIOR_TEMPORAL_RESTRICTION: 1

## 2023-09-19 ASSESSMENT — VISUAL ACUITY
METHOD: SNELLEN - LINEAR
CORRECTION_TYPE: GLASSES
OD_SC: 20/300
OS_CC: 20/15

## 2023-09-19 ASSESSMENT — REFRACTION_WEARINGRX
OS_CYLINDER: +1.00
SPECS_TYPE: SVL
OS_SPHERE: -3.00
OD_SPHERE: BALANCE
OS_AXIS: 087

## 2023-09-19 ASSESSMENT — SLIT LAMP EXAM - LIDS
COMMENTS: NORMAL
COMMENTS: NORMAL

## 2023-09-19 ASSESSMENT — EXTERNAL EXAM - LEFT EYE: OS_EXAM: NORMAL

## 2023-09-19 ASSESSMENT — EXTERNAL EXAM - RIGHT EYE: OD_EXAM: NORMAL

## 2023-09-19 NOTE — PROGRESS NOTES
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  First seen 9/17 and found to be showing signs of corneal rejection with an associated epi defect. Infection couldn't be ruled out. Started on fortified antibiotics 9/17/23.    Interval history (9/19): Things feel stable. Using drops as below.      POHx:  Granulomatosis polyangiitis/Marylin's  Corneal/Scleral Melts requiring patch grafts     Per Dr. 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 I will screen for activity with Cr, UA, CBC, LFT.  In view of prior use of cyclophosphamide for 9 months at high dose, I recommend screening for cardiotoxicity with echocardiogram. If systemic disease remains quiet/inactive, if local/ocular flares can be controlled with short bursts of prednisone and topical Rx, and if no further transplantation is contemplated, he may continue \"watchful waiting\" off systemic immunosuppression. Continue with Protopic (tacrolimus) treatment daily and steroid (dexamethasone) as needed, per Ophthalmology.     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 Primary consideration is rejection vs PUK.    He has a history of recurrent epithelial defects in the same " location as his new stromal opacification and epithelial defect concerning for infection. Collected cultures and started fortified tobramycin and vancomycin today 9/17/23. No known vegetative or water exposure.   -ultrasound today, no signs of endophthalmitis     Exam today shows epi defect with associated graft rejection. Possibly related to Granulomatosis. Patient had also nose bleeding.    # H/o penetrating keratoplasty (PK) (1/17/2008), Right Eye: from Granulomatosis w/ Polyangiitis   - most recent flare up was October 2016 (prior to that was January 2014)  - Area of epithelialized 30-40% corneal thinning present prior (small round inferonasal) stable    #. Granulomatosis w/ polyangiitis: follows Rheumatology at the [facility] last seen 1/2023 as above. No systemic symptoms today  - off systemic immunosuppression, continue f/u with rheum  -previously had cyclophosphamide    # Pseudophakia, Right Eye  - Stable, monitor .      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 Infection couldn't be ruled out.   - stop vanco and tobra to q2 hours  - continue pred QID, increase to q1h - may reduce later in week with improvement of cornea.  - Start medrol dose pack  - Continue PFATS Celluvisc 4-6x/day (or more) or similar  - Continue 0.1% tacrolimus at bedtime.  - continue Valtrex 1g TID to cover for HSV    RTC Thurs or Fri and 1 week.    Michaela Butler MD  Resident Physician, PGY-3  Department of Ophthalmology    Attending Physician Attestation:  Complete documentation of historical and exam elements from today's encounter can be found in the full encounter summary report (not reduplicated in this progress note).  I personally obtained the chief complaint(s) and history of present illness.  I confirmed and edited as necessary the review of systems, past medical/surgical history, family history, social history, and examination findings as documented by others; and I examined the  patient myself.  I personally reviewed the relevant tests, images, and reports as documented above.  I formulated and edited as necessary the assessment and plan and discussed the findings and management plan with the patient and family. - Andriy Harmon MD

## 2023-09-19 NOTE — NURSING NOTE
Chief Complaints and History of Present Illnesses   Patient presents with    Corneal Ulcer Follow Up     1 day follow up Corneal Ulcer right eye      Chief Complaint(s) and History of Present Illness(es)       Corneal Ulcer Follow Up              Associated symptoms: itching and foreign body sensation.  Negative for eye pain    Treatments tried: eye drops    Pain scale: 0/10    Comments: 1 day follow up Corneal Ulcer right eye               Comments    Vision has been slightly improving right eye since yesterday  No eye pain today, occasional Itchiness and FBS right eye   Left eye is doing well, no complaints.     Get Casillas 1:45 PM September 19, 2023

## 2023-09-22 ENCOUNTER — OFFICE VISIT (OUTPATIENT)
Dept: OPHTHALMOLOGY | Facility: CLINIC | Age: 53
End: 2023-09-22
Attending: OPHTHALMOLOGY
Payer: COMMERCIAL

## 2023-09-22 DIAGNOSIS — T86.91 GRAFT REJECTION, IMMUNOLOGIC: ICD-10-CM

## 2023-09-22 DIAGNOSIS — Z94.7 HISTORY OF PENETRATING KERATOPLASTY: Primary | ICD-10-CM

## 2023-09-22 DIAGNOSIS — H17.9 CORNEAL SCAR AND OPACITY: ICD-10-CM

## 2023-09-22 DIAGNOSIS — H16.011 CENTRAL CORNEAL ULCER OF RIGHT EYE: ICD-10-CM

## 2023-09-22 PROCEDURE — G0463 HOSPITAL OUTPT CLINIC VISIT: HCPCS | Performed by: OPHTHALMOLOGY

## 2023-09-22 PROCEDURE — 99213 OFFICE O/P EST LOW 20 MIN: CPT | Performed by: OPHTHALMOLOGY

## 2023-09-22 ASSESSMENT — EXTERNAL EXAM - RIGHT EYE: OD_EXAM: NORMAL

## 2023-09-22 ASSESSMENT — VISUAL ACUITY
OS_CC: 20/15
OD_PH_CC: 20/100
METHOD: SNELLEN - LINEAR
OD_CC: 20/300

## 2023-09-22 ASSESSMENT — SLIT LAMP EXAM - LIDS
COMMENTS: NORMAL
COMMENTS: NORMAL

## 2023-09-22 ASSESSMENT — TONOMETRY
OD_IOP_MMHG: 8
OS_IOP_MMHG: 17
IOP_METHOD: ICARE

## 2023-09-22 ASSESSMENT — CONF VISUAL FIELD
OS_NORMAL: 1
OS_SUPERIOR_NASAL_RESTRICTION: 0
OS_INFERIOR_NASAL_RESTRICTION: 0
OS_SUPERIOR_TEMPORAL_RESTRICTION: 0
OD_SUPERIOR_TEMPORAL_RESTRICTION: 3
OD_SUPERIOR_NASAL_RESTRICTION: 3
METHOD: COUNTING FINGERS
OS_INFERIOR_TEMPORAL_RESTRICTION: 0

## 2023-09-22 ASSESSMENT — EXTERNAL EXAM - LEFT EYE: OS_EXAM: NORMAL

## 2023-09-22 NOTE — PROGRESS NOTES
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  First seen 9/17 and found to be showing signs of corneal rejection with an associated epi defect. Infection couldn't be ruled out. Started on fortified antibiotics 9/17/23.    Interval history (9/22/23) Braxton is here to follow up on graft rejection RE with cloudiness. Vision may be a little better than 2 days ago.     POHx:  Granulomatosis polyangiitis/Marylin's  Corneal/Scleral Melts requiring patch grafts     Per Dr. 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 I will screen for activity with Cr, UA, CBC, LFT.  In view of prior use of cyclophosphamide for 9 months at high dose, I recommend screening for cardiotoxicity with echocardiogram. If systemic disease remains quiet/inactive, if local/ocular flares can be controlled with short bursts of prednisone and topical Rx, and if no further transplantation is contemplated, he may continue \"watchful waiting\" off systemic immunosuppression. Continue with Protopic (tacrolimus) treatment daily and steroid (dexamethasone) as needed, per Ophthalmology.     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 Primary consideration is rejection vs " PUK.  9/22/23: 1 mm epi defect, stromal opacification is decreased paracentrally.      He has a history of recurrent epithelial defects in the same location as his new stromal opacification and epithelial defect concerning for infection. Collected cultures and started fortified tobramycin and vancomycin today 9/17/23. No known vegetative or water exposure.   -ultrasound , no signs of endophthalmitis     Exam shows epi defect with associated graft rejection. Possibly related to Granulomatosis. Patient had also nose bleeding.    # H/o penetrating keratoplasty (PK) (1/17/2008), Right Eye: from Granulomatosis w/ Polyangiitis   - most recent flare up was October 2016 (prior to that was January 2014)  - Area of epithelialized 30-40% corneal thinning present prior (small round inferonasal) stable    #. Granulomatosis w/ polyangiitis: follows Rheumatology at the [facility] last seen 1/2023 as above. No systemic symptoms today  - off systemic immunosuppression, continue f/u with rheum  -previously had cyclophosphamide    # Pseudophakia, Right Eye  - Stable, monitor .      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 Infection couldn't be ruled out but less likely.   - continue pred QID,  q1h - may reduce later in week with improvement of cornea.  - continue medrol dose pack  - Continue PFATS Celluvisc 4-6x/day (or more) or similar  - Continue 0.1% tacrolimus at bedtime.  - continue Valtrex 1g TID to cover for HSV  - consider AMT if rejection heals and the epi defect persists.    RTC Tuesday with Dr Harmon    Attending Physician Attestation:  Complete documentation of historical and exam elements from today's encounter can be found in the full encounter summary report (not reduplicated in this progress note).  I personally obtained the chief complaint(s) and history of present illness.  I confirmed and edited as necessary the review of systems, past medical/surgical history, family  history, social history, and examination findings as documented by others; and I examined the patient myself.  I personally reviewed the relevant tests, images, and reports as documented above.  I formulated and edited as necessary the assessment and plan and discussed the findings and management plan with the patient and family. - Remy Hsieh MD

## 2023-09-22 NOTE — NURSING NOTE
Chief Complaints and History of Present Illnesses   Patient presents with    Graft Reject/fail/complication     Chief Complaint(s) and History of Present Illness(es)       Graft Reject/fail/complication              Laterality: right eye    Associated symptoms: redness and itching (RE).  Negative for eye pain.  Comments: (foreign body sensation: RE)    Pain scale: 0/10              Comments    Braxton is here to follow up on graft rejection RE with cloudiness. Vision may be a little better than 2 days ago.     Tevin White COT 8:27 AM September 22, 2023

## 2023-09-23 LAB
BACTERIA TISS BX CULT: ABNORMAL
GRAM STAIN RESULT: ABNORMAL
GRAM STAIN RESULT: ABNORMAL

## 2023-09-24 LAB
BACTERIA TISS BX CULT: ABNORMAL
BACTERIA TISS BX CULT: ABNORMAL

## 2023-09-26 ENCOUNTER — OFFICE VISIT (OUTPATIENT)
Dept: OPHTHALMOLOGY | Facility: CLINIC | Age: 53
End: 2023-09-26
Attending: OPHTHALMOLOGY
Payer: COMMERCIAL

## 2023-09-26 DIAGNOSIS — H17.9 CORNEAL SCAR AND OPACITY: ICD-10-CM

## 2023-09-26 DIAGNOSIS — T86.91 GRAFT REJECTION, IMMUNOLOGIC: Primary | ICD-10-CM

## 2023-09-26 DIAGNOSIS — Z94.7 HISTORY OF PENETRATING KERATOPLASTY: ICD-10-CM

## 2023-09-26 PROCEDURE — 99214 OFFICE O/P EST MOD 30 MIN: CPT | Mod: GC | Performed by: OPHTHALMOLOGY

## 2023-09-26 PROCEDURE — G0463 HOSPITAL OUTPT CLINIC VISIT: HCPCS | Performed by: OPHTHALMOLOGY

## 2023-09-26 PROCEDURE — 92285 EXTERNAL OCULAR PHOTOGRAPHY: CPT | Performed by: OPHTHALMOLOGY

## 2023-09-26 ASSESSMENT — VISUAL ACUITY
OS_CC: 20/15
METHOD: SNELLEN - LINEAR
CORRECTION_TYPE: GLASSES
OD_SC: 20/100
OD_PH_SC: 20/60

## 2023-09-26 ASSESSMENT — SLIT LAMP EXAM - LIDS
COMMENTS: NORMAL
COMMENTS: NORMAL

## 2023-09-26 ASSESSMENT — CONF VISUAL FIELD
OS_INFERIOR_NASAL_RESTRICTION: 0
OS_SUPERIOR_NASAL_RESTRICTION: 0
METHOD: COUNTING FINGERS
OS_INFERIOR_TEMPORAL_RESTRICTION: 0
OD_SUPERIOR_NASAL_RESTRICTION: 3
OS_SUPERIOR_TEMPORAL_RESTRICTION: 0
OS_NORMAL: 1

## 2023-09-26 ASSESSMENT — EXTERNAL EXAM - RIGHT EYE: OD_EXAM: NORMAL

## 2023-09-26 ASSESSMENT — EXTERNAL EXAM - LEFT EYE: OS_EXAM: NORMAL

## 2023-09-26 ASSESSMENT — TONOMETRY
OD_IOP_MMHG: 08
IOP_METHOD: ICARE
OS_IOP_MMHG: 15

## 2023-09-26 NOTE — NURSING NOTE
Chief Complaints and History of Present Illnesses   Patient presents with    Follow Up     Chief Complaint(s) and History of Present Illness(es)       Follow Up              Laterality: right eye    Associated symptoms: Negative for photophobia, flashes and floaters    Treatments tried: eye drops and artificial tears    Pain scale: 0/10              Comments    Follow up right eye cornea check.    The patient notes improved vision in the right eye.  He has no eye pain.   He is using Prednisolone hourly, PFAT's 4 times daily, Tacrolimus at bedtime, Moxifloxacin 4 times daily in the right eye.  He is taking Valtrex three times daily.  Samreen Holt, COA, COA 9:40 AM 09/26/2023

## 2023-09-26 NOTE — PROGRESS NOTES
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  First seen 9/17 and found to be showing signs of corneal rejection with an associated epi defect. Infection couldn't be ruled out. Started on fortified antibiotics 9/17/23.    Interval history (9/22/23) Braxton is here to follow up on graft rejection RE with cloudiness. Vision may be a little better than 2 days ago.     POHx:  Granulomatosis polyangiitis/Marylin's  Corneal/Scleral Melts requiring patch grafts     Per Dr. 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 I will screen for activity with Cr, UA, CBC, LFT.  In view of prior use of cyclophosphamide for 9 months at high dose, I recommend screening for cardiotoxicity with echocardiogram. If systemic disease remains quiet/inactive, if local/ocular flares can be controlled with short bursts of prednisone and topical Rx, and if no further transplantation is contemplated, he may continue \"watchful waiting\" off systemic immunosuppression. Continue with Protopic (tacrolimus) treatment daily and steroid (dexamethasone) as needed, per Ophthalmology.     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 Primary consideration is rejection vs PUK.  9/22/23: 1 " mm epi defect, stromal opacification is decreased paracentrally.  9/26/23: epithelial defect healed, quickTBUT. Much improved.      He has a history of recurrent epithelial defects in the same location as his new stromal opacification and epithelial defect concerning for infection. Collected cultures and started fortified tobramycin and vancomycin today 9/17/23. No known vegetative or water exposure.   -ultrasound , no signs of endophthalmitis  - medrol dose pack (started 9/18-9/23)     Exam shows epi defect with associated graft rejection, epi defect resolved. Possibly related to Granulomatosis. Patient had also nose bleeding.    # H/o penetrating keratoplasty (PK) (1/17/2008), Right Eye: from Granulomatosis w/ Polyangiitis   - most recent flare up was October 2016 (prior to that was January 2014)  - Area of epithelialized 30-40% corneal thinning present prior (small round inferonasal) stable    #. Granulomatosis w/ polyangiitis: follows Rheumatology at the [place] last seen 1/2023 as above. No systemic symptoms today  - off systemic immunosuppression, continue f/u with rheum  -previously had cyclophosphamide    # Pseudophakia, Right Eye  - Stable, monitor .      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 Infection couldn't be ruled out but less likely.   - decreased pred q2h   - Continue PFATS Celluvisc 4-6x/day (or more) or similar  - Continue 0.1% tacrolimus at bedtime.  - continue Valtrex 1g TID to cover for HSV (started 9/17/23)  - consider AMT if rejection heals and the epi defect persists.    RTC 1 - 2 weeks depends on Pt schedule. Call sooner with any problems    Rahel Marie MD  Cornea and External Disease Fellow  [facility]     Attending Physician Attestation:  Complete documentation of historical and exam elements from today's encounter can be found in the full encounter summary report (not reduplicated in this progress note).  I  personally obtained the chief complaint(s) and history of present illness.  I confirmed and edited as necessary the review of systems, past medical/surgical history, family history, social history, and examination findings as documented by others; and I examined the patient myself.  I personally reviewed the relevant tests, images, and reports as documented above.  I formulated and edited as necessary the assessment and plan and discussed the findings and management plan with the patient and family. - Andriy Harmon MD

## 2023-10-10 ENCOUNTER — OFFICE VISIT (OUTPATIENT)
Dept: OPHTHALMOLOGY | Facility: CLINIC | Age: 53
End: 2023-10-10
Attending: OPHTHALMOLOGY
Payer: COMMERCIAL

## 2023-10-10 DIAGNOSIS — T86.91 GRAFT REJECTION, IMMUNOLOGIC: ICD-10-CM

## 2023-10-10 DIAGNOSIS — H17.9 CORNEAL SCAR AND OPACITY: ICD-10-CM

## 2023-10-10 DIAGNOSIS — Z94.7 HISTORY OF PENETRATING KERATOPLASTY: Primary | ICD-10-CM

## 2023-10-10 PROCEDURE — 99214 OFFICE O/P EST MOD 30 MIN: CPT | Mod: GC | Performed by: OPHTHALMOLOGY

## 2023-10-10 PROCEDURE — 99213 OFFICE O/P EST LOW 20 MIN: CPT | Performed by: OPHTHALMOLOGY

## 2023-10-10 PROCEDURE — 92285 EXTERNAL OCULAR PHOTOGRAPHY: CPT | Performed by: OPHTHALMOLOGY

## 2023-10-10 ASSESSMENT — CONF VISUAL FIELD
OS_INFERIOR_NASAL_RESTRICTION: 0
OS_INFERIOR_TEMPORAL_RESTRICTION: 0
OD_SUPERIOR_TEMPORAL_RESTRICTION: 0
OD_INFERIOR_NASAL_RESTRICTION: 0
OD_NORMAL: 1
METHOD: COUNTING FINGERS
OD_INFERIOR_TEMPORAL_RESTRICTION: 0
OS_SUPERIOR_TEMPORAL_RESTRICTION: 0
OD_SUPERIOR_NASAL_RESTRICTION: 0
OS_SUPERIOR_NASAL_RESTRICTION: 0
OS_NORMAL: 1

## 2023-10-10 ASSESSMENT — TONOMETRY
OD_IOP_MMHG: 9
IOP_METHOD: ICARE
OS_IOP_MMHG: 10

## 2023-10-10 ASSESSMENT — SLIT LAMP EXAM - LIDS
COMMENTS: NORMAL
COMMENTS: NORMAL

## 2023-10-10 ASSESSMENT — REFRACTION_WEARINGRX
OS_CYLINDER: +1.00
OD_SPHERE: BALANCE
SPECS_TYPE: SVL
OS_AXIS: 087
OS_SPHERE: -3.00

## 2023-10-10 ASSESSMENT — VISUAL ACUITY
OD_PH_SC+: +2
OS_CC: 20/20
OD_SC: 20/80
OD_PH_SC: 20/70
METHOD: SNELLEN - LINEAR

## 2023-10-10 ASSESSMENT — EXTERNAL EXAM - RIGHT EYE: OD_EXAM: NORMAL

## 2023-10-10 ASSESSMENT — EXTERNAL EXAM - LEFT EYE: OS_EXAM: NORMAL

## 2023-10-10 NOTE — PROGRESS NOTES
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  First seen 9/17 and found to be showing signs of corneal rejection with an associated epi defect. Infection couldn't be ruled out. Started on fortified antibiotics 9/17/23.    Interval history (10/10/23) Braxton is here to follow up on graft rejection RE with cloudiness. Vision feels much better and feels like things are back to normal.    POHx:  Granulomatosis polyangiitis/Marylin's  Corneal/Scleral Melts requiring patch grafts     Per Dr. 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 I will screen for activity with Cr, UA, CBC, LFT.  In view of prior use of cyclophosphamide for 9 months at high dose, I recommend screening for cardiotoxicity with echocardiogram. If systemic disease remains quiet/inactive, if local/ocular flares can be controlled with short bursts of prednisone and topical Rx, and if no further transplantation is contemplated, he may continue \"watchful waiting\" off systemic immunosuppression. Continue with Protopic (tacrolimus) treatment daily and steroid (dexamethasone) as needed, per Ophthalmology.     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 Primary consideration is rejection vs " PUK.  9/22/23: 1 mm epi defect, stromal opacification is decreased paracentrally.  9/26/23: epithelial defect healed, quickTBUT. Much improved.  10/10/23: Improved edema, no recurrent epi defect. Improved from prior    He has a history of recurrent epithelial defects in the same location as his new stromal opacification and epithelial defect concerning for infection. Collected cultures and started fortified tobramycin and vancomycin today 9/17/23. No known vegetative or water exposure.   -ultrasound , no signs of endophthalmitis  - medrol dose pack (started 9/18-9/23)     Exam shows epi defect with associated graft rejection, epi defect resolved. Possibly related to Granulomatosis. Patient had also nose bleeding.    # H/o penetrating keratoplasty (PK) (1/17/2008), Right Eye: from Granulomatosis w/ Polyangiitis   - most recent flare up was October 2016 (prior to that was January 2014)  - Area of epithelialized 30-40% corneal thinning present prior (small round inferonasal) stable    #. Granulomatosis w/ polyangiitis: follows Rheumatology at the [place] last seen 1/2023 as above. No systemic symptoms today  - off systemic immunosuppression, continue f/u with rheum  -previously had cyclophosphamide    # Pseudophakia, Right Eye  - Stable, monitor .      Plan:  - Patient was showing signs of corneal rejection with an associated epi defect. Infection couldn't be ruled out but less likely.   - decreased pred  6 / day.  - Continue PFATS Celluvisc 4-6x/day (or more) or similar  - Continue 0.1% tacrolimus at bedtime.  - Vigamox QID  - continue Valtrex 1g TID to cover for HSV (started 9/17/23)  - consider AMT if rejection heals and the epi defect persists.    RTC 1 - 2 weeks depends on Pt schedule. Call sooner with any problems    Michaela Butler MD  Resident Physician, PGY-3  Department of Ophthalmology    Attending Physician Attestation:  Complete documentation of historical and exam elements from today's encounter can be  found in the full encounter summary report (not reduplicated in this progress note).  I personally obtained the chief complaint(s) and history of present illness.  I confirmed and edited as necessary the review of systems, past medical/surgical history, family history, social history, and examination findings as documented by others; and I examined the patient myself.  I personally reviewed the relevant tests, images, and reports as documented above.  I formulated and edited as necessary the assessment and plan and discussed the findings and management plan with the patient and family. - Andriy Harmon MD      I personally reviewed the ophthalmic test(s) associated with this encounter, agree with the interpretation(s) as documented by the resident/fellow, and have edited the corresponding report(s) as necessary. Andriy Harmon MD

## 2023-10-15 LAB — BACTERIA TISS BX CULT: NO GROWTH

## 2023-10-20 ENCOUNTER — OFFICE VISIT (OUTPATIENT)
Dept: OPHTHALMOLOGY | Facility: CLINIC | Age: 53
End: 2023-10-20
Attending: OPHTHALMOLOGY
Payer: COMMERCIAL

## 2023-10-20 DIAGNOSIS — Z94.7 HISTORY OF PENETRATING KERATOPLASTY: Primary | ICD-10-CM

## 2023-10-20 DIAGNOSIS — H17.9 CORNEAL SCAR AND OPACITY: ICD-10-CM

## 2023-10-20 DIAGNOSIS — T86.91 GRAFT REJECTION, IMMUNOLOGIC: ICD-10-CM

## 2023-10-20 PROCEDURE — 99214 OFFICE O/P EST MOD 30 MIN: CPT | Mod: GC | Performed by: OPHTHALMOLOGY

## 2023-10-20 PROCEDURE — 99211 OFF/OP EST MAY X REQ PHY/QHP: CPT | Performed by: OPHTHALMOLOGY

## 2023-10-20 ASSESSMENT — VISUAL ACUITY
OD_PH_SC+: -2
METHOD: SNELLEN - LINEAR
OS_CC: 20/20
OD_PH_SC: 20/30
OD_SC+: +2
OD_SC: 20/50
CORRECTION_TYPE: GLASSES

## 2023-10-20 ASSESSMENT — TONOMETRY
OD_IOP_MMHG: 10
IOP_METHOD: ICARE
OS_IOP_MMHG: 13

## 2023-10-20 ASSESSMENT — REFRACTION_WEARINGRX
OS_CYLINDER: +1.00
OS_AXIS: 087
OS_SPHERE: -3.00
SPECS_TYPE: SVL
OD_SPHERE: BALANCE

## 2023-10-20 ASSESSMENT — EXTERNAL EXAM - LEFT EYE: OS_EXAM: NORMAL

## 2023-10-20 ASSESSMENT — EXTERNAL EXAM - RIGHT EYE: OD_EXAM: NORMAL

## 2023-10-20 ASSESSMENT — SLIT LAMP EXAM - LIDS
COMMENTS: NORMAL
COMMENTS: NORMAL

## 2023-10-20 NOTE — PROGRESS NOTES
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  First seen 9/17 and found to be showing signs of corneal rejection with an associated epi defect. Infection couldn't be ruled out. Started on fortified antibiotics 9/17/23.    Interval history (10/20/23) Braxton is here to follow up on graft rejection RE with cloudiness. Vision feels much better and feels like things are back to normal.    POHx:  Granulomatosis polyangiitis/Marylin's  Corneal/Scleral Melts requiring patch grafts     Per Dr. 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 I will screen for activity with Cr, UA, CBC, LFT.  In view of prior use of cyclophosphamide for 9 months at high dose, I recommend screening for cardiotoxicity with echocardiogram. If systemic disease remains quiet/inactive, if local/ocular flares can be controlled with short bursts of prednisone and topical Rx, and if no further transplantation is contemplated, he may continue \"watchful waiting\" off systemic immunosuppression. Continue with Protopic (tacrolimus) treatment daily and steroid (dexamethasone) as needed, per Ophthalmology.     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 Primary consideration is rejection vs " PUK.  9/22/23: 1 mm epi defect, stromal opacification is decreased paracentrally.  9/26/23: epithelial defect healed, quickTBUT. Much improved.  10/10/23: Improved edema, no recurrent epi defect. Improved from prior    He has a history of recurrent epithelial defects in the same location as his new stromal opacification and epithelial defect concerning for infection. Collected cultures and started fortified tobramycin and vancomycin today 9/17/23. No known vegetative or water exposure.   -ultrasound , no signs of endophthalmitis  - medrol dose pack (started 9/18-9/23)     Exam shows epi defect with associated graft rejection, epi defect resolved. Possibly related to Granulomatosis. Patient had also nose bleeding.    # H/o penetrating keratoplasty (PK) (1/17/2008), Right Eye: from Granulomatosis w/ Polyangiitis   - most recent flare up was October 2016 (prior to that was January 2014)  - Area of epithelialized 30-40% corneal thinning present prior (small round inferonasal) stable    #. Granulomatosis w/ polyangiitis: follows Rheumatology at the [place] last seen 1/2023 as above. No systemic symptoms today  - off systemic immunosuppression, continue f/u with rheum  -previously had cyclophosphamide    # Pseudophakia, Right Eye  - Stable, monitor .      Plan:  - Patient was showing signs of corneal rejection with an associated epi defect. Infection couldn't be ruled out but less likely.   - decreased pred  4 / day, then 2 times a day for a month , then once a day for amonth, then stop and use PRN  - Continue PFATS Celluvisc 4-6x/day (or more) or similar  - Continue 0.1% tacrolimus at bedtime.  - Stop Vigamox QID  -  Stop Valtrex 1g TID to cover for HSV (started 9/17/23)      RTC 4 weeks VT, Call sooner with any problems    Rahel Marie MD  Cornea and External Disease Fellow  [facility]     Attending Physician Attestation:  Complete documentation of historical and exam elements from today's  encounter can be found in the full encounter summary report (not reduplicated in this progress note).  I personally obtained the chief complaint(s) and history of present illness.  I confirmed and edited as necessary the review of systems, past medical/surgical history, family history, social history, and examination findings as documented by others; and I examined the patient myself.  I personally reviewed the relevant tests, images, and reports as documented above.  I formulated and edited as necessary the assessment and plan and discussed the findings and management plan with the patient and family. - Andriy Harmon MD

## 2023-10-20 NOTE — NURSING NOTE
Chief Complaints and History of Present Illnesses   Patient presents with    Follow Up     1 week follow up  Concern for bacterial ulcer right eye Vs PUK     Chief Complaint(s) and History of Present Illness(es)       Follow Up              Comments: 1 week follow up  Concern for bacterial ulcer right eye Vs PUK              Comments    Pt states vision is about the same as last visit. No eye pain today.  No new redness or dryness. No discharge.    JESI Caldwell October 20, 2023 7:48 AM

## 2023-11-16 ENCOUNTER — OFFICE VISIT (OUTPATIENT)
Dept: OPHTHALMOLOGY | Facility: CLINIC | Age: 53
End: 2023-11-16
Attending: OPHTHALMOLOGY
Payer: COMMERCIAL

## 2023-11-16 DIAGNOSIS — Z94.7 HISTORY OF PENETRATING KERATOPLASTY: ICD-10-CM

## 2023-11-16 DIAGNOSIS — T86.91 GRAFT REJECTION, IMMUNOLOGIC: ICD-10-CM

## 2023-11-16 DIAGNOSIS — H17.9 CORNEAL SCAR AND OPACITY: Primary | ICD-10-CM

## 2023-11-16 PROCEDURE — 99214 OFFICE O/P EST MOD 30 MIN: CPT | Mod: GC | Performed by: OPHTHALMOLOGY

## 2023-11-16 PROCEDURE — 99214 OFFICE O/P EST MOD 30 MIN: CPT | Performed by: OPHTHALMOLOGY

## 2023-11-16 ASSESSMENT — TONOMETRY
OD_IOP_MMHG: 11
OS_IOP_MMHG: 19
IOP_METHOD: ICARE

## 2023-11-16 ASSESSMENT — VISUAL ACUITY
OD_PH_SC: 20/50
METHOD: SNELLEN - LINEAR
CORRECTION_TYPE: GLASSES
OS_CC+: +2
OS_CC: 20/20
OD_PH_SC+: +1
OD_SC: 20/70

## 2023-11-16 ASSESSMENT — REFRACTION_WEARINGRX
OS_SPHERE: -3.00
OD_SPHERE: BALANCE
OS_CYLINDER: +1.00
OS_AXIS: 087
SPECS_TYPE: SVL

## 2023-11-16 ASSESSMENT — SLIT LAMP EXAM - LIDS
COMMENTS: NORMAL
COMMENTS: NORMAL

## 2023-11-16 ASSESSMENT — EXTERNAL EXAM - RIGHT EYE: OD_EXAM: NORMAL

## 2023-11-16 ASSESSMENT — EXTERNAL EXAM - LEFT EYE: OS_EXAM: NORMAL

## 2023-11-16 NOTE — PROGRESS NOTES
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  First seen 9/17 and found to be showing signs of corneal rejection with an associated epi defect. Infection couldn't be ruled out. Started on fortified antibiotics 9/17/23.    Interval history (11/16/23) Braxton is here to follow up on graft rejection RE with cloudiness. Has been stable with resolution of edema and epi defect. Vision is back to baseline, no eye pain, photophobia, redness. No flashes or floaters.     POHx:  Granulomatosis polyangiitis/Marylin's  Corneal/Scleral Melts requiring patch grafts     Per Dr. 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 I will screen for activity with Cr, UA, CBC, LFT.  In view of prior use of cyclophosphamide for 9 months at high dose, I recommend screening for cardiotoxicity with echocardiogram. If systemic disease remains quiet/inactive, if local/ocular flares can be controlled with short bursts of prednisone and topical Rx, and if no further transplantation is contemplated, he may continue \"watchful waiting\" off systemic immunosuppression. Continue with Protopic (tacrolimus) treatment daily and steroid (dexamethasone) as needed, per Ophthalmology.     Rituximab would be considered if graft-threatening eye disease recurs.\"      Gtts:   - Pred 3-4x/day  - s/p medrol dose pack (started 9/18-9/23)  - PFATS Celluvisc 4-6x/day  - 0.1% tacrolimus at bedtime right eye  - Completed Valtrex 1g TID    Surgery:  - K wound revision (6/2012) ([facility])  - Repair of K perforation with corneoscleral patch graft right eye (1/2008) ([facility])  - Glued AMT due to leakage of K transplant wound 12/2007) ([facility])  - Initial PKP ~1990's per patient    Assessment & Plan:  # Concern for bacterial ulcer right eye Vs PUK  -9/19/23: " infection unlikely. Primary consideration is rejection vs PUK.  9/22/23: 1 mm epi defect, stromal opacification is decreased paracentrally.  9/26/23: epithelial defect healed, quickTBUT. Much improved.  10/10/23: Improved edema, no recurrent epi defect. Improved from prior  11/16/23: resolved edema, epithelium intact, knv regressed. Stable.    He has a history of recurrent epithelial defects in the same location as his new stromal opacification and epithelial defect concerning for infection. Collected cultures and started fortified tobramycin and vancomycin today 9/17/23. No known vegetative or water exposure.   -ultrasound , no signs of endophthalmitis  - medrol dose pack (started 9/18-9/23)     Exam shows epi defect with associated graft rejection, epi defect resolved. Possibly related to Granulomatosis. Patient had also nose bleeding.    # H/o penetrating keratoplasty (PK) (1/17/2008), Right Eye: from Granulomatosis w/ Polyangiitis   - most recent flare up was October 2016 (prior to that was January 2014)  - Area of epithelialized 30-40% corneal thinning present prior (small round inferonasal) stable    #. Granulomatosis w/ polyangiitis: follows Rheumatology at the [facility] last seen 1/2023 as above. No systemic symptoms today  - off systemic immunosuppression, continue f/u with rheum  -previously had cyclophosphamide    # Pseudophakia, Right Eye  - Stable, monitor .      Plan:  - Patient was showing signs of corneal rejection with an associated epi defect. Infection couldn't be ruled out but less likely.   - decrease prednisolone 2 times a day for a month , then once a day for amonth, then stop and use PRN  - Continue PFATS Celluvisc 4-6x/day (or more) or similar  - Continue 0.1% tacrolimus at bedtime.  - Not interested in contact lens evaluation       RTC 2 VT, Call sooner with any problems    Rahel Marie MD  Cornea and External Disease Fellow  [facility]     Attending Physician  Attestation:  Complete documentation of historical and exam elements from today's encounter can be found in the full encounter summary report (not reduplicated in this progress note).  I personally obtained the chief complaint(s) and history of present illness.  I confirmed and edited as necessary the review of systems, past medical/surgical history, family history, social history, and examination findings as documented by others; and I examined the patient myself.  I personally reviewed the relevant tests, images, and reports as documented above.  I formulated and edited as necessary the assessment and plan and discussed the findings and management plan with the patient and family. - Andriy Harmon MD

## 2023-11-16 NOTE — NURSING NOTE
Chief Complaints and History of Present Illnesses   Patient presents with    Follow Up     3 week follow up Concern for bacterial ulcer right eye Vs PUK     Chief Complaint(s) and History of Present Illness(es)       Follow Up              Comments: 3 week follow up Concern for bacterial ulcer right eye Vs PUK              Comments    Pt states vision is the same as last visit, fluctuates slightly. No eye pain today. No new flashes or floaters.    JESI Caldwell November 16, 2023 8:30 AM

## 2024-01-23 ENCOUNTER — OFFICE VISIT (OUTPATIENT)
Dept: OPHTHALMOLOGY | Facility: CLINIC | Age: 54
End: 2024-01-23
Attending: OPHTHALMOLOGY
Payer: COMMERCIAL

## 2024-01-23 DIAGNOSIS — H53.8 BLURRED VISION, RIGHT EYE: ICD-10-CM

## 2024-01-23 DIAGNOSIS — Z94.7 HISTORY OF PENETRATING KERATOPLASTY: ICD-10-CM

## 2024-01-23 DIAGNOSIS — H17.9 CORNEAL SCAR AND OPACITY: Primary | ICD-10-CM

## 2024-01-23 DIAGNOSIS — T86.91 GRAFT REJECTION, IMMUNOLOGIC: ICD-10-CM

## 2024-01-23 PROCEDURE — 99214 OFFICE O/P EST MOD 30 MIN: CPT | Mod: GC | Performed by: OPHTHALMOLOGY

## 2024-01-23 PROCEDURE — 99214 OFFICE O/P EST MOD 30 MIN: CPT | Performed by: OPHTHALMOLOGY

## 2024-01-23 ASSESSMENT — REFRACTION_WEARINGRX
OS_AXIS: 087
OD_SPHERE: BALANCE
OS_CYLINDER: +1.00
OS_SPHERE: -3.00
SPECS_TYPE: SVL

## 2024-01-23 ASSESSMENT — EXTERNAL EXAM - LEFT EYE: OS_EXAM: NORMAL

## 2024-01-23 ASSESSMENT — TONOMETRY
OD_IOP_MMHG: 10
IOP_METHOD: ICARE
OS_IOP_MMHG: 18

## 2024-01-23 ASSESSMENT — VISUAL ACUITY
OD_SC: 20/70
METHOD: SNELLEN - LINEAR
CORRECTION_TYPE: GLASSES
OS_CC: 20/15
OD_PH_SC+: -1
OD_PH_SC: 20/30

## 2024-01-23 ASSESSMENT — SLIT LAMP EXAM - LIDS
COMMENTS: NORMAL
COMMENTS: NORMAL

## 2024-01-23 ASSESSMENT — EXTERNAL EXAM - RIGHT EYE: OD_EXAM: NORMAL

## 2024-01-23 NOTE — NURSING NOTE
"Chief Complaints and History of Present Illnesses   Patient presents with    Follow Up     Corneal scar and opacity - Right Eye     Chief Complaint(s) and History of Present Illness(es)       Follow Up              Comments: Corneal scar and opacity - Right Eye              Comments    C/o occ \"Blurred white spot\" in vision in the right eye over the last month  States no tearing eye pain or redness    rGeta Elias COT 7:06 AM January 23, 2024                        "

## 2024-01-23 NOTE — PROGRESS NOTES
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  First seen 9/17 and found to be showing signs of corneal rejection with an associated epi defect. Infection couldn't be ruled out. Started on fortified antibiotics 9/17/23.    Interval history (11/16/23) Braxton is here to follow up on graft rejection RE with cloudiness. Has been stable with resolution of edema and epi defect. Vision is back to baseline, no eye pain, photophobia, redness. No flashes or floaters.     POHx:  Granulomatosis polyangiitis/Marylin's  Corneal/Scleral Melts requiring patch grafts     Per Dr. 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 I will screen for activity with Cr, UA, CBC, LFT.  In view of prior use of cyclophosphamide for 9 months at high dose, I recommend screening for cardiotoxicity with echocardiogram. If systemic disease remains quiet/inactive, if local/ocular flares can be controlled with short bursts of prednisone and topical Rx, and if no further transplantation is contemplated, he may continue \"watchful waiting\" off systemic immunosuppression. Continue with Protopic (tacrolimus) treatment daily and steroid (dexamethasone) as needed, per Ophthalmology.     Rituximab would be considered if graft-threatening eye disease recurs.\"      Gtts:   - Pred 1-2x/day  - s/p medrol dose pack (started 9/18-9/23)  - PFATS Celluvisc 4-6x/day  - 0.1% tacrolimus at bedtime right eye  - Completed Valtrex 1g TID    Surgery:  - K wound revision (6/2012) ([facility])  - Repair of K perforation with corneoscleral patch graft right eye (1/2008) ([facility])  - Glued AMT due to leakage of K transplant wound 12/2007) ([facility])  - Initial PKP ~1990's per patient    Assessment & Plan:  # Concern for bacterial ulcer right eye Vs PUK  -9/19/23: " infection unlikely. Primary consideration is rejection vs PUK.  9/22/23: 1 mm epi defect, stromal opacification is decreased paracentrally.  9/26/23: epithelial defect healed, quickTBUT. Much improved.  10/10/23: Improved edema, no recurrent epi defect. Improved from prior  11/16/23: resolved edema, epithelium intact, knv regressed. Stable.  1/23/24: Stable exam with no edema or epi defect. Stable. Pt with small central blur - likely floater but wiol consult retina to rule out central serous and other etiologies.    He has a history of recurrent epithelial defects in the same location as his new stromal opacification and epithelial defect concerning for infection. Collected cultures and started fortified tobramycin and vancomycin today 9/17/23. No known vegetative or water exposure.   -ultrasound , no signs of endophthalmitis  - medrol dose pack (started 9/18-9/23)     Exam shows epi defect with associated graft rejection, epi defect resolved. Possibly related to Granulomatosis. Patient had also nose bleeding.    # H/o penetrating keratoplasty (PK) (1/17/2008), Right Eye: from Granulomatosis w/ Polyangiitis   - most recent flare up was October 2016 (prior to that was January 2014)  - Area of epithelialized 30-40% corneal thinning present prior (small round inferonasal) stable    #. Granulomatosis w/ polyangiitis: follows Rheumatology at the [facility] last seen 1/2023 as above. No systemic symptoms today  - off systemic immunosuppression, continue f/u with rheum  -previously had cyclophosphamide    # Pseudophakia, Right Eye  - Stable, monitor .    # 1/23/24: New localized hazy spot in visual field, right eye  - Likely represents floater vs other retinal pathology       Plan:  - Patient was showing signs of corneal rejection with an associated epi defect. Infection couldn't be ruled out but less likely.   - decrease prednisolone 2 times a day for a month , then once a day for amonth, then stop and use PRN  - Continue  PFATS Celluvisc 4-6x/day (or more) or similar  - Continue 0.1% tacrolimus at bedtime.  - Not interested in contact lens evaluation   - Referral to retina clinic for further evaluation of new hazy spot     RTC 2 VT, Call sooner with any problems    Love Schafer MD  PGY-2  Department of Ophthalmology  January 23, 2024 7:41 AM     Attending Physician Attestation:  Complete documentation of historical and exam elements from today's encounter can be found in the full encounter summary report (not reduplicated in this progress note).  I personally obtained the chief complaint(s) and history of present illness.  I confirmed and edited as necessary the review of systems, past medical/surgical history, family history, social history, and examination findings as documented by others; and I examined the patient myself.  I personally reviewed the relevant tests, images, and reports as documented above.  I formulated and edited as necessary the assessment and plan and discussed the findings and management plan with the patient and family. - Andriy Harmon MD

## 2024-01-31 ENCOUNTER — OFFICE VISIT (OUTPATIENT)
Dept: OPHTHALMOLOGY | Facility: CLINIC | Age: 54
End: 2024-01-31
Attending: OPHTHALMOLOGY
Payer: COMMERCIAL

## 2024-01-31 DIAGNOSIS — H43.393 VITREOUS SYNERESIS OF BOTH EYES: ICD-10-CM

## 2024-01-31 DIAGNOSIS — Z96.1 PSEUDOPHAKIA OF RIGHT EYE: ICD-10-CM

## 2024-01-31 DIAGNOSIS — H43.811 PVD (POSTERIOR VITREOUS DETACHMENT), RIGHT: Primary | ICD-10-CM

## 2024-01-31 DIAGNOSIS — H43.393 VITREOUS SYNERESIS OF BOTH EYES: Primary | ICD-10-CM

## 2024-01-31 DIAGNOSIS — H17.9 CORNEAL SCAR AND OPACITY: ICD-10-CM

## 2024-01-31 PROCEDURE — 92250 FUNDUS PHOTOGRAPHY W/I&R: CPT | Performed by: OPHTHALMOLOGY

## 2024-01-31 PROCEDURE — 99213 OFFICE O/P EST LOW 20 MIN: CPT | Performed by: OPHTHALMOLOGY

## 2024-01-31 PROCEDURE — 99214 OFFICE O/P EST MOD 30 MIN: CPT | Performed by: OPHTHALMOLOGY

## 2024-01-31 PROCEDURE — 99207 FUNDUS PHOTOS OU (BOTH EYES): CPT | Mod: 26 | Performed by: OPHTHALMOLOGY

## 2024-01-31 PROCEDURE — 92134 CPTRZ OPH DX IMG PST SGM RTA: CPT | Performed by: OPHTHALMOLOGY

## 2024-01-31 ASSESSMENT — TONOMETRY
OS_IOP_MMHG: 14
IOP_METHOD: ICARE
OD_IOP_MMHG: 8

## 2024-01-31 ASSESSMENT — CONF VISUAL FIELD
OS_SUPERIOR_NASAL_RESTRICTION: 0
OD_INFERIOR_NASAL_RESTRICTION: 0
METHOD: COUNTING FINGERS
OS_INFERIOR_NASAL_RESTRICTION: 0
OS_SUPERIOR_TEMPORAL_RESTRICTION: 0
OS_NORMAL: 1
OD_NORMAL: 1
OD_SUPERIOR_TEMPORAL_RESTRICTION: 0
OD_INFERIOR_TEMPORAL_RESTRICTION: 0
OS_INFERIOR_TEMPORAL_RESTRICTION: 0
OD_SUPERIOR_NASAL_RESTRICTION: 0

## 2024-01-31 ASSESSMENT — VISUAL ACUITY
CORRECTION_TYPE: GLASSES
OD_PH_SC: 20/25
OD_PH_SC+: -2
METHOD: SNELLEN - LINEAR
OD_SC: 20/50
OD_SC+: +1
OS_CC: 20/15

## 2024-01-31 ASSESSMENT — REFRACTION_WEARINGRX
OD_SPHERE: BALANCE
SPECS_TYPE: SVL
OS_CYLINDER: +1.00
OS_AXIS: 087
OS_SPHERE: -3.00

## 2024-01-31 ASSESSMENT — SLIT LAMP EXAM - LIDS
COMMENTS: NORMAL
COMMENTS: NORMAL

## 2024-01-31 ASSESSMENT — EXTERNAL EXAM - RIGHT EYE: OD_EXAM: NORMAL

## 2024-01-31 ASSESSMENT — EXTERNAL EXAM - LEFT EYE: OS_EXAM: NORMAL

## 2024-01-31 NOTE — NURSING NOTE
Chief Complaints and History of Present Illnesses   Patient presents with    Retinal Evaluation     New Pt here for retinal eval per Dr Harmon.     Chief Complaint(s) and History of Present Illness(es)       Retinal Evaluation              Laterality: both eyes    Associated symptoms: floaters.  Negative for dryness, eye pain, tearing and flashes    Treatments tried: eye drops and artificial tears    Pain scale: 0/10    Comments: New Pt here for retinal eval per Dr Harmon.              Comments    Pt reports new stationary blind/blurry spots that come and go.  Starting in 12/23.  These spots are more central. Pt unsure which eye but thinks it is RE.  Night vision has been affected.  Overall VA is the same.  No pain.    No flashes.  No change to floaters.    Pred RE daily  AT's RE PRN   Protopic miles RE at night    RHODA Pérez January 31, 2024 1:04 PM

## 2024-01-31 NOTE — PROGRESS NOTES
CC -   floaters     INTERVAL HISTORY - Initial visit    HPI -   Braxton Ayala is a  53 year old year-old patient presenting for floaters right eye. Started about 6 w ago and becoming less frequent. Denies flashes    Hx of ANCA associated vasculitis, right eye keratitis, and multiple corneal grafts right eye (last PKP 2009). Being followed by Dr. Harmon    RETINAL IMAGING:  OCT 1/31/24  OD - normal foveal contour; PHF detached  OS - normal fovea; PHF attached      ASSESSMENT & PLAN    # PVD right eye   In examination and confirmed with OCT   Very difficult peripheral exam because of cornea graft and scars and being pseudophakic; no apparent retinal tear in limited exam  Retina detachment precautions were discussed with the patient (presence or increased in flashes, floaters or a curtain in the visual field) and was asked to return if any of the those occur  Discussed that it is likely that if there was a tear, it would have had caused problems since the start of PVD ~6 w ago; so the chance of retinal tear and subsequent detachment is small (though not zero)    # hx of corneal graft right eye   # psedueophakia right eye   Looks great; continue to follow with Dr. Harmon and with retina if continues to have symptoms or new symptoms      Complete documentation of historical and exam elements from today's encounter can be found in the full encounter summary report (not reduplicated in this progress note). I personally obtained the chief complaint(s) and history of present illness.  I confirmed and edited as necessary the review of systems, past medical/surgical history, family history, social history, and examination findings as documented by others; and I examined the patient myself. I personally reviewed the relevant tests, images, and reports as documented above. I formulated and edited as necessary the assessment and plan and discussed the findings and management plan with the patient and family.     Miles Tabor,  MD, PhD

## 2024-02-29 NOTE — PROGRESS NOTES
"[facility]  Rheumatology Clinic  Quique Gould MD  2024    Name: Braxton Ayala  MRN: [id number]  Age: 53 year old  : 1970  [facility]    Assessment and Plan:  # Limited ANCA-associated vasculitis: Flare in eye symptoms starting 2023 with sudden, overnight presentation redness, edema, irritation, and cloudiness in vision. Saw Dr. 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 Returned to baseline after course of steroids and increase in topical eye treatment. Sustained eye symptoms or signs have not recurred since . There are no extraocular symptoms. Right shoulder pain likely unrelated to vasculitis. Exam is unchanged.     Data: Blood work 2023 showed creatinine of 1.25, stable; CRP less than 3; rheumatoid factor less than 6, CBC normal except for mildly elevated platelets at 482,000; urinalysis was clear.  In 2023, SETH was negative; anti-double-stranded DNA negative; ANCA negative.    Discussion: I think that ANCA-associated vasculitis remains in remission. Flare in  was likely due to transplant rejection inflammation. Check organ function, but no systemic immune modulating medication is warranted. I will screen for activity with Cr, UA, CBC, LFT.  In view of prior use of cyclophosphamide for 9 months at high dose, I recommend screening for cardiotoxicity with echocardiogram. If systemic disease remains quiet/inactive, if local/ocular flares can be controlled with short bursts of prednisone and topical Rx, and if no further transplantation is contemplated, he may continue \"watchful waiting\" off systemic immunosuppression. Continue with Protopic (tacrolimus) treatment daily and steroid (dexamethasone) as needed, per Ophthalmology.    Rituximab would be considered if graft-threatening eye disease recurs.    # Health maintenance: Patient remains normotensive, and " "reports regular use of lisinopril 40 mg daily.  I recommend regular monitoring of blood pressure and follow-up with primary care.  Blood pressure control target should be 130/80.     Plan:  1.  Check creatinine, liver function, CBC with platelets, urinalysis.  2.  Monitor for secondary malignancies associated with prior cyclophosphamide use by monitoring for systemic symptoms, and annual urinalysis.  Follow-up for colonoscopy recommended in 3 years.  3. Monitor BP at home weekly, record diastolic and systolic blood pressures and review blood pressure record with primary care to determine adequacy of antihypertensive therapy.  Goal 130/80. Check Echocardiogram.  4.  Continue follow-up with ophthalmology regarding regular topical use of prednisolone and immunosuppressive medication    Follow-up: Return in about 1 year    Seen by Ariadna Santa, medical student    I was present with the medical student who took the history and acted as a scribe. I have verified the history, reviewed imaging and laboratory data, and personally performed the physical exam. I formulated the assessment and plan.    Quique Gould M.D.  Staff Rheumatologist, [facility]  Pager [phone]      No orders of the defined types were placed in this encounter.      HPI:   Braxton Ayala has a history including limited ANCA-associated vasculitis who presents for follow-up. I last saw the patient 1-23, at which time the patient continued stable off of systemic immunosuppression without new symptoms or symptom progression. The plan was to proceed with watchful waiting.    Limited ANCA-associated vasculitis:  R corneal \"melt\" 2007, s/p 9 months of Cytoxan infusion completed 2008, s/p maintenance azathioprine through 9-10. Corneal graft failure and repeat transplant in 4-11; Rx MMF 1000 qd from 9-12 through 12-13    Interval history March 1, 2024    Patient saw Dr. Harmon in ophthalmology on January 23, 2024.  Assessment was of concern for bacterial ulcer in " "the right eye versus peripheral ulcerative keratitis.  Primary consideration is rejection, based upon symptoms that had been first noted in September 2023.  Exam on 23 January revealed no edema or epithelial defect. Retinal imaging showed right eye PHF detached. No systemic symptoms of granulomatous polyangiitis were elicited.  Recommendation was to decrease prednisone drops, continue Celluvisc, and tacrolimus. At present he experiences some cloudiness in vision, comes and goes, 20/50. No redness recently. Also has had some intermittent right arm / shoulder numbness, soreness but not limiting daily activities. Started doing resistance training,     Interval history January 13, 2023    Patient saw Dr. Harmon in ophthalmology in May 2022.  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  Area of epithelialized 30 to 40% corneal thinning present but stable; stromal haze continues.  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  Protopic and prednisolone acetate drops were continued. He will see Ophtho again in 2-2023.    Today,No eye pain, watering, acuity is stable/improved.    he reports no bloody nasal drainage or phlegm.   +stable R hand 5th digit lancinating pain with altered sensation, starting in the R shoulder blade area.  No rash other than intermittent scaly patches on forearms in sun exposed areas.  Monitoring BP q month.  He describes some concern about heart toxicity secondary to his cyclophosphamide use in 2008.  He has lost 15 lbs in recent months \"partially intentionally\"  He works full-time, mostly onsite.    Interval history 1-  He did have an episode of right eye pain in March 2021.  Diagnosis through ophthalmology was epithelial abrasion, now improved and continuing to monitor.    Patient saw Dr. Harmon in ophthalmology in November 2021; status of right corneal transplant was thought to be stable.  No bloody nasal drainage or phlegm. " "  No eye pain, watering.  +stable R hand 5th digit lancinating pain with altered sensation.   No rash other than \"red dots\" blanchable; not itchy.  Monitoring BP q month.  Not exercising regularly. Does walk the dog, no DYSPNEA ON EXERTION.  COVID19 vaccinated and boosted.      Interval history 11-:  In January 2020, he developed a corneal ulcer.  He was treated in ophthalmology with tacrolimus, prednisolone topical therapy.  Symptoms and signs resolved as of follow-up visit with ophthalmology in March 2020.    He tested positive for COVI19 on 10-30-20 after workplace exposure. He had fever, cough, congestion for a few days. He had marked fatigue for 3 days. Tested negative 4 days later. Still wheezing, but energy level has returned. Treated with aspirin only during illness.    He had flu shot on 10-10; he had marked fever shortly afterwards.    His R eye occasionally gets \"irritated\"--he uses steroid drops. No further episodes of eye irritation since 3-2020.    No chest, abdomen, skin, joint, nerve symptoms recently.       Review of Systems:   Pertinent items are noted in HPI or as below, remainder of complete ROS is negative.      No recent problems with hearing, + occasional vision cloudiness but  No swallowing problems.   No breathing difficulty, shortness of breath, coughing, or wheezing.  No chest pain or palpitations.  No heart burn, indigestion, abdominal pain, nausea, vomiting, diarrhea.  No urination problems, no bloody, cloudy urine, no dysuria.  No numbing, tingling, weakness.  No rashes. No easy bleeding or bruising.     Active Medications:   Current Outpatient Medications   Medication Sig    acetaminophen (TYLENOL) 325 MG tablet Take 325-650 mg by mouth every 6 hours as needed    carboxymethylcellulose PF (REFRESH PLUS) 0.5 % ophthalmic solution 1 drop 3 times daily as needed    lisinopril (PRINIVIL/ZESTRIL) 20 MG tablet Take 20 mg by mouth    methylPREDNISolone (MEDROL DOSEPAK) 4 MG tablet " "therapy pack Follow Package Directions    ofloxacin (OCUFLOX) 0.3 % ophthalmic solution Place 1-2 drops into the right eye 4 times daily    prednisoLONE acetate (PRED FORTE) 1 % ophthalmic suspension Place 1 drop into the right eye every hour    tacrolimus (PROTOPIC) 0.1 % external ointment Apply topically daily in the right eye every other day    tobramycin (TOBREX) 15 mg/mL compounded ophthalmic solution Place 1 drop into the right eye every hour    valACYclovir (VALTREX) 1000 mg tablet Take 1 tablet (1,000 mg) by mouth 3 times daily for 10 days    vancomycin (VANCOCIN) 25 mg/mL in hypromellose 0.3% cmpd ophthalmic solution Place 1 drop into the right eye every hour     No current facility-administered medications for this visit.     Allergies:  No known drug allergies.     Past Medical History:  ANCA associated vasculitis, limited presenting with corneal melt in 2008, s/p 9 months of cytoxan infusion finished in july 2008, started on Imuran 150mg/d (8/08) and prednisone was tapered off by the end of sept 2008, s/p corneal transplant graft R eye march 2009, developed Cataracts,s/p R eye cataracts surgery 3-10. Imuran tapered off 9-10. \"Rejection\" of graft 9-10 treated with oral prednisone and steroid drops. 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 New graft placed 4-11.   Hypertension  Nonsenile cataract  Corneal defect    Past Surgical History:  Right eye removal and replacement of suture  Corneal transplant, penetrating x2  Cataract IOL bilateral    Family History:    Family history reviewed. No pertinent family history.    Social History:  The patient reports that he has never smoked. He has never used smokeless tobacco. He reports current alcohol use. He reports that he does not use drugs.   PCP: Darshan Verdugo  Marital Status:      Physical Exam:   There were no vitals taken for this visit.   Wt Readings from Last 4 Encounters:   01/13/23 86.1 kg (189 lb 14.4 oz)   01/14/22 " 87.6 kg (193 lb 3.2 oz)   11/22/19 83.9 kg (184 lb 14.4 oz)   09/14/18 86.2 kg (190 lb)     Constitutional: Well-developed, appearing stated age; cooperative.  Eyes: Normal EOM, PERRLA, vision, conjunctiva, sclera. Unchanged sclerosis in the right cornea with ovoid iris.   ENT: Normal external ears, nose, hearing, lips, teeth, gums, throat. No mucous membrane lesions.  Neck: No mass or thyroid enlargement.  MSK: normal strength, slight upper right arm, shoulder pain with internal rotation.   Resp: breathing unlabored  Neuro: Normal cranial nerves  Psych: Normal judgement, orientation, memory, affect.     Laboratory:       Latest Ref Rng & Units 1/13/2023     7:40 AM 1/27/2023     7:44 AM 9/18/2023    11:09 AM   RHEUM RESULTS   Albumin 3.5 - 5.2 g/dL 4.3      ALT 10 - 50 U/L 25      AST 10 - 50 U/L 20      SETH interpretation Negative   Negative    Creatinine 0.67 - 1.17 mg/dL 1.46  1.25     CRP Inflammation <5.00 mg/L <3.00      DNA-ds <10.0 IU/mL   1.1    GFR Estimate >60 mL/min/1.73m2 58  70     Hematocrit 40.0 - 53.0 % 46.0      Hemoglobin 13.3 - 17.7 g/dL 15.9      WBC 4.0 - 11.0 10e3/uL 10.6      RBC Count 4.40 - 5.90 10e6/uL 5.03      RDW 10.0 - 15.0 % 12.3      MCHC 31.5 - 36.5 g/dL 34.6      MCV 78 - 100 fL 92      Platelet Count 150 - 450 10e3/uL 482      Rheumatoid Factor <12 IU/mL   <6      Neutrophil Cytoplasmic IgG Antibody   Date Value Ref Range Status   09/15/2017 <1:20 <1:20 Final     Comment:     (Note)  The ANCA IFA is <1:20; therefore, no further testing will   be performed.  INTERPRETIVE INFORMATION: Anti-Neutrophil Cyto Ab, IgG  Neutrophil Cytoplasmic Antibodies (C-ANCA = granular   cytoplasmic staining, P-ANCA = perinuclear staining) are   found in the serum of over 90 percent of patients with   certain necrotizing systemic vasculitides, and usually in   less than 5 percent of patients with collagen vascular   disease or arthritis.  Performed by [email or web address],  [address], [facility] [place] [address]  [phone]  [email or web address] Tramaine Allison MD, Lab. Director       IGG   Date Value Ref Range Status   06/24/2009 1220 695 - 1620 mg/dL Final

## 2024-03-01 ENCOUNTER — OFFICE VISIT (OUTPATIENT)
Dept: RHEUMATOLOGY | Facility: CLINIC | Age: 54
End: 2024-03-01
Attending: INTERNAL MEDICINE
Payer: COMMERCIAL

## 2024-03-01 ENCOUNTER — LAB (OUTPATIENT)
Dept: LAB | Facility: CLINIC | Age: 54
End: 2024-03-01
Payer: COMMERCIAL

## 2024-03-01 VITALS
WEIGHT: 195 LBS | HEIGHT: 71 IN | BODY MASS INDEX: 27.3 KG/M2 | HEART RATE: 79 BPM | DIASTOLIC BLOOD PRESSURE: 72 MMHG | SYSTOLIC BLOOD PRESSURE: 130 MMHG

## 2024-03-01 DIAGNOSIS — I77.82 ANCA-ASSOCIATED VASCULITIS (H): ICD-10-CM

## 2024-03-01 DIAGNOSIS — I77.82 ANCA-ASSOCIATED VASCULITIS (H): Primary | ICD-10-CM

## 2024-03-01 LAB
ALBUMIN SERPL BCG-MCNC: 4.5 G/DL (ref 3.5–5.2)
ALBUMIN UR-MCNC: NEGATIVE MG/DL
ALP SERPL-CCNC: 78 U/L (ref 40–150)
ALT SERPL W P-5'-P-CCNC: 19 U/L (ref 0–70)
ANION GAP SERPL CALCULATED.3IONS-SCNC: 8 MMOL/L (ref 7–15)
APPEARANCE UR: CLEAR
AST SERPL W P-5'-P-CCNC: 20 U/L (ref 0–45)
BASOPHILS # BLD AUTO: 0.1 10E3/UL (ref 0–0.2)
BASOPHILS NFR BLD AUTO: 1 %
BILIRUB SERPL-MCNC: 0.5 MG/DL
BILIRUB UR QL STRIP: NEGATIVE
BUN SERPL-MCNC: 15.5 MG/DL (ref 6–20)
CALCIUM SERPL-MCNC: 9.8 MG/DL (ref 8.6–10)
CHLORIDE SERPL-SCNC: 101 MMOL/L (ref 98–107)
COLOR UR AUTO: NORMAL
CREAT SERPL-MCNC: 1.27 MG/DL (ref 0.67–1.17)
DEPRECATED HCO3 PLAS-SCNC: 27 MMOL/L (ref 22–29)
EGFRCR SERPLBLD CKD-EPI 2021: 68 ML/MIN/1.73M2
EOSINOPHIL # BLD AUTO: 0.3 10E3/UL (ref 0–0.7)
EOSINOPHIL NFR BLD AUTO: 3 %
ERYTHROCYTE [DISTWIDTH] IN BLOOD BY AUTOMATED COUNT: 11.9 % (ref 10–15)
GLUCOSE SERPL-MCNC: 83 MG/DL (ref 70–99)
GLUCOSE UR STRIP-MCNC: NEGATIVE MG/DL
HCT VFR BLD AUTO: 47 % (ref 40–53)
HGB BLD-MCNC: 16.6 G/DL (ref 13.3–17.7)
HGB UR QL STRIP: NEGATIVE
IMM GRANULOCYTES # BLD: 0.1 10E3/UL
IMM GRANULOCYTES NFR BLD: 1 %
KETONES UR STRIP-MCNC: NEGATIVE MG/DL
LEUKOCYTE ESTERASE UR QL STRIP: NEGATIVE
LYMPHOCYTES # BLD AUTO: 2.3 10E3/UL (ref 0.8–5.3)
LYMPHOCYTES NFR BLD AUTO: 24 %
MCH RBC QN AUTO: 31.7 PG (ref 26.5–33)
MCHC RBC AUTO-ENTMCNC: 35.3 G/DL (ref 31.5–36.5)
MCV RBC AUTO: 90 FL (ref 78–100)
MONOCYTES # BLD AUTO: 0.7 10E3/UL (ref 0–1.3)
MONOCYTES NFR BLD AUTO: 8 %
NEUTROPHILS # BLD AUTO: 6.1 10E3/UL (ref 1.6–8.3)
NEUTROPHILS NFR BLD AUTO: 63 %
NITRATE UR QL: NEGATIVE
NRBC # BLD AUTO: 0 10E3/UL
NRBC BLD AUTO-RTO: 0 /100
PH UR STRIP: 6 [PH] (ref 5–7)
PLATELET # BLD AUTO: 433 10E3/UL (ref 150–450)
POTASSIUM SERPL-SCNC: 4.3 MMOL/L (ref 3.4–5.3)
PROT SERPL-MCNC: 7.7 G/DL (ref 6.4–8.3)
RBC # BLD AUTO: 5.24 10E6/UL (ref 4.4–5.9)
RBC URINE: 2 /HPF
SODIUM SERPL-SCNC: 136 MMOL/L (ref 135–145)
SP GR UR STRIP: 1.03 (ref 1–1.03)
SQUAMOUS EPITHELIAL: <1 /HPF
UROBILINOGEN UR STRIP-MCNC: NORMAL MG/DL
WBC # BLD AUTO: 9.6 10E3/UL (ref 4–11)
WBC URINE: <1 /HPF

## 2024-03-01 PROCEDURE — 80053 COMPREHEN METABOLIC PANEL: CPT | Performed by: PATHOLOGY

## 2024-03-01 PROCEDURE — 36415 COLL VENOUS BLD VENIPUNCTURE: CPT | Performed by: PATHOLOGY

## 2024-03-01 PROCEDURE — 85025 COMPLETE CBC W/AUTO DIFF WBC: CPT | Performed by: PATHOLOGY

## 2024-03-01 PROCEDURE — 99214 OFFICE O/P EST MOD 30 MIN: CPT | Performed by: INTERNAL MEDICINE

## 2024-03-01 PROCEDURE — 99213 OFFICE O/P EST LOW 20 MIN: CPT | Performed by: INTERNAL MEDICINE

## 2024-03-01 PROCEDURE — 81001 URINALYSIS AUTO W/SCOPE: CPT | Performed by: PATHOLOGY

## 2024-03-01 RX ORDER — ROSUVASTATIN CALCIUM 5 MG/1
1 TABLET, COATED ORAL DAILY
COMMUNITY
Start: 2023-06-06

## 2024-03-01 ASSESSMENT — PAIN SCALES - GENERAL: PAINLEVEL: NO PAIN (0)

## 2024-03-01 NOTE — NURSING NOTE
"Chief Complaint   Patient presents with    Follow Up     1 year       /72   Pulse 79   Ht 1.803 m (5' 11\")   Wt 88.5 kg (195 lb)   BMI 27.20 kg/m    Cesilia Banda MA on 3/1/2024 at 7:18 AM    "

## 2024-03-01 NOTE — LETTER
3/1/2024       RE: Braxton Ayala  [address] [facility] [id number]     Dear Colleague,    Thank you for referring your patient, Braxton Ayala, to the [facility]HE[facility]OLOG[facility] [place] [facility]. Please see a copy of my visit note below.        [facility]  Rheumatology Clinic  Quique Gould MD  2024    Name: Braxton Ayala  MRN: [id number]  Age: 53 year old  : 1970  Referring provider: [facility]    Assessment and Plan:  # Limited ANCA-associated vasculitis: Flare in eye symptoms starting 2023 with sudden, overnight presentation redness, edema, irritation, and cloudiness in vision. Saw Dr. 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 Returned to baseline after course of steroids and increase in topical eye treatment. Sustained eye symptoms or signs have not recurred since . There are no extraocular symptoms. Right shoulder pain likely unrelated to vasculitis. Exam is unchanged.     Data: Blood work 2023 showed creatinine of 1.25, stable; CRP less than 3; rheumatoid factor less than 6, CBC normal except for mildly elevated platelets at 482,000; urinalysis was clear.  In 2023, SETH was negative; anti-double-stranded DNA negative; ANCA negative.    Discussion: I think that ANCA-associated vasculitis remains in remission. Flare in  was likely due to transplant rejection inflammation. Check organ function, but no systemic immune modulating medication is warranted. I will screen for activity with Cr, UA, CBC, LFT.  In view of prior use of cyclophosphamide for 9 months at high dose, I recommend screening for cardiotoxicity with echocardiogram. If systemic disease remains quiet/inactive, if local/ocular flares can be controlled with short bursts of prednisone and topical Rx, and if no further transplantation is contemplated, he  "may continue \"watchful waiting\" off systemic immunosuppression. Continue with Protopic (tacrolimus) treatment daily and steroid (dexamethasone) as needed, per Ophthalmology.    Rituximab would be considered if graft-threatening eye disease recurs.    # Health maintenance: Patient remains normotensive, and reports regular use of lisinopril 40 mg daily.  I recommend regular monitoring of blood pressure and follow-up with primary care.  Blood pressure control target should be 130/80.     Plan:  1.  Check creatinine, liver function, CBC with platelets, urinalysis.  2.  Monitor for secondary malignancies associated with prior cyclophosphamide use by monitoring for systemic symptoms, and annual urinalysis.  Follow-up for colonoscopy recommended in 3 years.  3. Monitor BP at home weekly, record diastolic and systolic blood pressures and review blood pressure record with primary care to determine adequacy of antihypertensive therapy.  Goal 130/80. Check Echocardiogram.  4.  Continue follow-up with ophthalmology regarding regular topical use of prednisolone and immunosuppressive medication    Follow-up: Return in about 1 year    Seen by Ariadna Santa, medical student    I was present with the medical student who took the history and acted as a scribe. I have verified the history, reviewed imaging and laboratory data, and personally performed the physical exam. I formulated the assessment and plan.    Quique Gould M.D.  Staff Rheumatologist,  Health  Pager [phone]      No orders of the defined types were placed in this encounter.      HPI:   Braxton Ayala has a history including limited ANCA-associated vasculitis who presents for follow-up. I last saw the patient 1-23, at which time the patient continued stable off of systemic immunosuppression without new symptoms or symptom progression. The plan was to proceed with watchful waiting.    Limited ANCA-associated vasculitis:  R corneal \"melt\" 2007, s/p 9 months of Cytoxan " "infusion completed 2008, s/p maintenance azathioprine through 9-10. Corneal graft failure and repeat transplant in 4-11; Rx MMF 1000 qd from 9-12 through 12-13    Interval history March 1, 2024    Patient saw Dr. Harmon in ophthalmology on January 23, 2024.  Assessment was of concern for bacterial ulcer in the right eye versus peripheral ulcerative keratitis.  Primary consideration is rejection, based upon symptoms that had been first noted in September 2023.  Exam on 23 January revealed no edema or epithelial defect. Retinal imaging showed right eye PHF detached. No systemic symptoms of granulomatous polyangiitis were elicited.  Recommendation was to decrease prednisone drops, continue Celluvisc, and tacrolimus. At present he experiences some cloudiness in vision, comes and goes, 20/50. No redness recently. Also has had some intermittent right arm / shoulder numbness, soreness but not limiting daily activities. Started doing resistance training,     Interval history January 13, 2023    Patient saw Dr. Harmon in ophthalmology in May 2022.  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  Area of epithelialized 30 to 40% corneal thinning present but stable; stromal haze continues.  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  Protopic and prednisolone acetate drops were continued. He will see Ophtho again in 2-2023.    Today,No eye pain, watering, acuity is stable/improved.    he reports no bloody nasal drainage or phlegm.   +stable R hand 5th digit lancinating pain with altered sensation, starting in the R shoulder blade area.  No rash other than intermittent scaly patches on forearms in sun exposed areas.  Monitoring BP q month.  He describes some concern about heart toxicity secondary to his cyclophosphamide use in 2008.  He has lost 15 lbs in recent months \"partially intentionally\"  He works full-time, mostly onsite.    Interval history 1-  He " "did have an episode of right eye pain in March 2021.  Diagnosis through ophthalmology was epithelial abrasion, now improved and continuing to monitor.    Patient saw Dr. Harmon in ophthalmology in November 2021; status of right corneal transplant was thought to be stable.  No bloody nasal drainage or phlegm.   No eye pain, watering.  +stable R hand 5th digit lancinating pain with altered sensation.   No rash other than \"red dots\" blanchable; not itchy.  Monitoring BP q month.  Not exercising regularly. Does walk the dog, no DYSPNEA ON EXERTION.  COVID19 vaccinated and boosted.      Interval history 11-:  In January 2020, he developed a corneal ulcer.  He was treated in ophthalmology with tacrolimus, prednisolone topical therapy.  Symptoms and signs resolved as of follow-up visit with ophthalmology in March 2020.    He tested positive for COVI19 on 10-30-20 after workplace exposure. He had fever, cough, congestion for a few days. He had marked fatigue for 3 days. Tested negative 4 days later. Still wheezing, but energy level has returned. Treated with aspirin only during illness.    He had flu shot on 10-10; he had marked fever shortly afterwards.    His R eye occasionally gets \"irritated\"--he uses steroid drops. No further episodes of eye irritation since 3-2020.    No chest, abdomen, skin, joint, nerve symptoms recently.       Review of Systems:   Pertinent items are noted in HPI or as below, remainder of complete ROS is negative.      No recent problems with hearing, + occasional vision cloudiness but  No swallowing problems.   No breathing difficulty, shortness of breath, coughing, or wheezing.  No chest pain or palpitations.  No heart burn, indigestion, abdominal pain, nausea, vomiting, diarrhea.  No urination problems, no bloody, cloudy urine, no dysuria.  No numbing, tingling, weakness.  No rashes. No easy bleeding or bruising.     Active Medications:   Current Outpatient Medications   Medication Sig " "    acetaminophen (TYLENOL) 325 MG tablet Take 325-650 mg by mouth every 6 hours as needed     carboxymethylcellulose PF (REFRESH PLUS) 0.5 % ophthalmic solution 1 drop 3 times daily as needed     lisinopril (PRINIVIL/ZESTRIL) 20 MG tablet Take 20 mg by mouth     methylPREDNISolone (MEDROL DOSEPAK) 4 MG tablet therapy pack Follow Package Directions     ofloxacin (OCUFLOX) 0.3 % ophthalmic solution Place 1-2 drops into the right eye 4 times daily     prednisoLONE acetate (PRED FORTE) 1 % ophthalmic suspension Place 1 drop into the right eye every hour     tacrolimus (PROTOPIC) 0.1 % external ointment Apply topically daily in the right eye every other day     tobramycin (TOBREX) 15 mg/mL compounded ophthalmic solution Place 1 drop into the right eye every hour     valACYclovir (VALTREX) 1000 mg tablet Take 1 tablet (1,000 mg) by mouth 3 times daily for 10 days     vancomycin (VANCOCIN) 25 mg/mL in hypromellose 0.3% cmpd ophthalmic solution Place 1 drop into the right eye every hour     No current facility-administered medications for this visit.     Allergies:  No known drug allergies.     Past Medical History:  ANCA associated vasculitis, limited presenting with corneal melt in 2008, s/p 9 months of cytoxan infusion finished in july 2008, started on Imuran 150mg/d (8/08) and prednisone was tapered off by the end of sept 2008, s/p corneal transplant graft R eye march 2009, developed Cataracts,s/p R eye cataracts surgery 3-10. Imuran tapered off 9-10. \"Rejection\" of graft 9-10 treated with oral prednisone and steroid drops. 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 New graft placed 4-11.   Hypertension  Nonsenile cataract  Corneal defect    Past Surgical History:  Right eye removal and replacement of suture  Corneal transplant, penetrating x2  Cataract IOL bilateral    Family History:    Family history reviewed. No pertinent family history.    Social History:  The patient reports that he has never " smoked. He has never used smokeless tobacco. He reports current alcohol use. He reports that he does not use drugs.   PCP: Lucina, Darshan Grigsby  Marital Status:      Physical Exam:   There were no vitals taken for this visit.   Wt Readings from Last 4 Encounters:   01/13/23 86.1 kg (189 lb 14.4 oz)   01/14/22 87.6 kg (193 lb 3.2 oz)   11/22/19 83.9 kg (184 lb 14.4 oz)   09/14/18 86.2 kg (190 lb)     Constitutional: Well-developed, appearing stated age; cooperative.  Eyes: Normal EOM, PERRLA, vision, conjunctiva, sclera. Unchanged sclerosis in the right cornea with ovoid iris.   ENT: Normal external ears, nose, hearing, lips, teeth, gums, throat. No mucous membrane lesions.  Neck: No mass or thyroid enlargement.  MSK: normal strength, slight upper right arm, shoulder pain with internal rotation.   Resp: breathing unlabored  Neuro: Normal cranial nerves  Psych: Normal judgement, orientation, memory, affect.     Laboratory:       Latest Ref Rng & Units 1/13/2023     7:40 AM 1/27/2023     7:44 AM 9/18/2023    11:09 AM   RHEUM RESULTS   Albumin 3.5 - 5.2 g/dL 4.3      ALT 10 - 50 U/L 25      AST 10 - 50 U/L 20      SETH interpretation Negative   Negative    Creatinine 0.67 - 1.17 mg/dL 1.46  1.25     CRP Inflammation <5.00 mg/L <3.00      DNA-ds <10.0 IU/mL   1.1    GFR Estimate >60 mL/min/1.73m2 58  70     Hematocrit 40.0 - 53.0 % 46.0      Hemoglobin 13.3 - 17.7 g/dL 15.9      WBC 4.0 - 11.0 10e3/uL 10.6      RBC Count 4.40 - 5.90 10e6/uL 5.03      RDW 10.0 - 15.0 % 12.3      MCHC 31.5 - 36.5 g/dL 34.6      MCV 78 - 100 fL 92      Platelet Count 150 - 450 10e3/uL 482      Rheumatoid Factor <12 IU/mL   <6      Neutrophil Cytoplasmic IgG Antibody   Date Value Ref Range Status   09/15/2017 <1:20 <1:20 Final     Comment:     (Note)  The ANCA IFA is <1:20; therefore, no further testing will   be performed.  INTERPRETIVE INFORMATION: Anti-Neutrophil Cyto Ab, IgG  Neutrophil Cytoplasmic Antibodies (C-ANCA = granular    cytoplasmic staining, P-ANCA = perinuclear staining) are   found in the serum of over 90 percent of patients with   certain necrotizing systemic vasculitides, and usually in   less than 5 percent of patients with collagen vascular   disease or arthritis.  Performed by [facility],  [address] [place] [id number] [phone]  [email or web address], Tramaine Allison MD, Lab. Director       IGG   Date Value Ref Range Status   06/24/2009 1220 695 - 1620 mg/dL Final         Again, thank you for allowing me to participate in the care of your patient.      Sincerely,    Quique Gould MD

## 2024-03-01 NOTE — PATIENT INSTRUCTIONS
Diagnosis:  1. History of limited ANCA associated vasculitis with corneal and ocular involvement (2008): 9-2023 was likely due to transplant rejection inflammation. Check organ function, but no immune modulating medication is warranted.    Plan:  1.  Check creatinine, liver function, CBC with platelets, urinalysis.  2.  Monitor for secondary malignancies associated with prior cyclophosphamide use by monitoring for systemic symptoms, and annual urinalysis.  Follow-up for colonoscopy recommended in 3 years.  3. Monitor BP at home weekly, record diastolic and systolic blood pressures and review blood pressure record with primary care to determine adequacy of antihypertensive therapy.  Goal 130/80. Check Echocardiogram.  4.  Continue follow-up with ophthalmology regarding regular topical use of prednisolone and immunosuppressive medication

## 2024-05-09 ENCOUNTER — TELEPHONE (OUTPATIENT)
Dept: OPHTHALMOLOGY | Facility: CLINIC | Age: 54
End: 2024-05-09
Payer: COMMERCIAL

## 2024-06-27 ENCOUNTER — HOSPITAL ENCOUNTER (OUTPATIENT)
Dept: CARDIOLOGY | Facility: CLINIC | Age: 54
Discharge: HOME | End: 2024-06-27
Attending: INTERNAL MEDICINE | Admitting: INTERNAL MEDICINE
Payer: COMMERCIAL

## 2024-06-27 DIAGNOSIS — I77.82 ANCA-ASSOCIATED VASCULITIS (H): ICD-10-CM

## 2024-06-27 LAB — LVEF ECHO: NORMAL

## 2024-06-27 PROCEDURE — 93306 TTE W/DOPPLER COMPLETE: CPT

## 2024-06-27 PROCEDURE — 93306 TTE W/DOPPLER COMPLETE: CPT | Mod: 26 | Performed by: INTERNAL MEDICINE

## 2024-07-24 ENCOUNTER — OFFICE VISIT (OUTPATIENT)
Dept: OPHTHALMOLOGY | Facility: CLINIC | Age: 54
End: 2024-07-24
Attending: OPHTHALMOLOGY
Payer: COMMERCIAL

## 2024-07-24 DIAGNOSIS — Z94.7 HISTORY OF PENETRATING KERATOPLASTY: ICD-10-CM

## 2024-07-24 DIAGNOSIS — T86.91 GRAFT REJECTION, IMMUNOLOGIC: ICD-10-CM

## 2024-07-24 DIAGNOSIS — H17.9 CORNEAL SCAR AND OPACITY: Primary | ICD-10-CM

## 2024-07-24 PROCEDURE — 99213 OFFICE O/P EST LOW 20 MIN: CPT | Mod: GC | Performed by: OPHTHALMOLOGY

## 2024-07-24 PROCEDURE — 99214 OFFICE O/P EST MOD 30 MIN: CPT | Performed by: OPHTHALMOLOGY

## 2024-07-24 ASSESSMENT — TONOMETRY
IOP_METHOD: ICARE
OD_IOP_MMHG: 9
OS_IOP_MMHG: 16

## 2024-07-24 ASSESSMENT — REFRACTION_WEARINGRX
OD_SPHERE: BALANCE
OS_CYLINDER: +1.00
OS_AXIS: 087
SPECS_TYPE: SVL
OS_SPHERE: -3.00

## 2024-07-24 ASSESSMENT — VISUAL ACUITY
CORRECTION_TYPE: GLASSES
OS_CC: 20/15
OD_PH_SC: 20/50
OD_SC: 20/80
METHOD: SNELLEN - LINEAR
OD_PH_SC+: -2

## 2024-07-24 ASSESSMENT — EXTERNAL EXAM - RIGHT EYE: OD_EXAM: NORMAL

## 2024-07-24 ASSESSMENT — EXTERNAL EXAM - LEFT EYE: OS_EXAM: NORMAL

## 2024-07-24 ASSESSMENT — SLIT LAMP EXAM - LIDS
COMMENTS: NORMAL
COMMENTS: NORMAL

## 2024-07-24 NOTE — PROGRESS NOTES
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  First seen 9/17 and found to be showing signs of corneal rejection with an associated epi defect. Infection couldn't be ruled out. Started on fortified antibiotics 9/17/23.      Interval hx 07/24/2024  Chief Complaint(s) and History of Present Illness(es)       Cornea Transplant Follow Up    In right eye.  This started months ago.  Since onset it is stable.  Associated symptoms include Negative for eye pain, redness, tearing and photophobia.  Treatments tried include eye drops, artificial tears and glasses.  Pain was noted as 0/10. Additional comments: Follow up for PK right eye.     Patient notes vision is stable each eye over the last several months. Denies eye pain.              Comments    Ocular meds:   - Prednisolone PRN   - Celluvisc 4-6x/day   - Tacrolimus at bedtime   - PFAT PRN, \"using about once a day\"     Hina Ndiaye, COT 7:26 AM 07/24/2024                   POHx:  Granulomatosis polyangiitis/Marylin's  Corneal/Scleral Melts requiring patch grafts     Per Dr. 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 I will screen for activity with Cr, UA, CBC, LFT.  In view of prior use of cyclophosphamide for 9 months at high dose, I recommend screening for cardiotoxicity with echocardiogram. If systemic disease remains quiet/inactive, if local/ocular flares can be controlled with short bursts of prednisone and topical Rx, and if no further transplantation is contemplated, he may continue \"watchful waiting\" off systemic immunosuppression. Continue with Protopic (tacrolimus) treatment daily and steroid (dexamethasone) as needed, per Ophthalmology.     Rituximab would be considered if graft-threatening eye disease recurs.\"      Gtts:   - Pred " 1-2x/day  - s/p medrol dose pack (started 9/18-9/23)  - PFATS Celluvisc 4-6x/day  - 0.1% tacrolimus at bedtime right eye  - Completed Valtrex 1g TID    Surgery:  - K wound revision (6/2012) ([facility])  - Repair of K perforation with corneoscleral patch graft right eye (1/2008) ([facility])  - Glued AMT due to leakage of K transplant wound 12/2007) ([facility])  - Initial PKP ~1990's per patient    Assessment & Plan:  # Concern for bacterial ulcer right eye Vs PUK (healed now) with corneal scar.  -9/19/23: infection unlikely. Primary consideration is rejection vs PUK.  9/22/23: 1 mm epi defect, stromal opacification is decreased paracentrally.  9/26/23: epithelial defect healed, quickTBUT. Much improved.  10/10/23: Improved edema, no recurrent epi defect. Improved from prior  11/16/23: resolved edema, epithelium intact, knv regressed. Stable.  1/23/24: Stable exam with no edema or epi defect. Stable. Pt with small central blur - likely floater but will consult retina to rule out central serous and other etiologies.  07/24/24: Stable exam with no edema or epi defect. No new concerns.     He has a history of recurrent epithelial defects in the same location as his new stromal opacification and epithelial defect concerning for infection. Collected cultures and started fortified tobramycin and vancomycin today 9/17/23. No known vegetative or water exposure.   -ultrasound , no signs of endophthalmitis  - medrol dose pack (started 9/18-9/23)     Exam shows epi defect with associated graft rejection, epi defect resolved. Possibly related to Granulomatosis. Patient had also nose bleeding.    # H/o penetrating keratoplasty (PK) (1/17/2008), Right Eye: from Granulomatosis w/ Polyangiitis   - most recent flare up was October 2016 (prior to that was January 2014)  - Area of epithelialized 30-40% corneal thinning present prior (small round inferonasal) stable    #. Granulomatosis w/ polyangiitis: follows [facility]  last seen 1/2023 as above. No systemic symptoms today  - off systemic immunosuppression, continue f/u with rheum  -previously had cyclophosphamide    # Pseudophakia, Right Eye  - Stable, monitor .    # localized hazy spot in visual field, right eye  - First noted 1/23   - Evaluated by Dr. Tabor and likely PVD       Plan:  - Continue prednisolone PRN   - Continue PFATS Celluvisc 4-6x/day (or more) or similar  - Continue 0.1% tacrolimus at bedtime.  - Not interested in contact lens evaluation       RTC 1 year VT     Love Schafer MD  PGY-2  Department of Ophthalmology  Attending Physician Attestation:  Complete documentation of historical and exam elements from today's encounter can be found in the full encounter summary report (not reduplicated in this progress note).  I personally obtained the chief complaint(s) and history of present illness.  I confirmed and edited as necessary the review of systems, past medical/surgical history, family history, social history, and examination findings as documented by others; and I examined the patient myself.  I personally reviewed the relevant tests, images, and reports as documented above.  I formulated and edited as necessary the assessment and plan and discussed the findings and management plan with the patient and family. - Remy Hsieh MD

## 2024-07-24 NOTE — NURSING NOTE
"Chief Complaints and History of Present Illnesses   Patient presents with    Cornea Transplant Follow Up     Follow up for PK right eye.     Patient notes vision is stable each eye over the last several months. Denies eye pain.      Chief Complaint(s) and History of Present Illness(es)       Cornea Transplant Follow Up              Laterality: right eye    Onset: months ago    Course: stable    Associated symptoms: Negative for eye pain, redness, tearing and photophobia    Treatments tried: eye drops, artificial tears and glasses    Pain scale: 0/10    Comments: Follow up for PK right eye.     Patient notes vision is stable each eye over the last several months. Denies eye pain.               Comments    Ocular meds:   - Prednisolone PRN   - Celluvisc 4-6x/day   - Tacrolimus at bedtime   - PFAT PRN, \"using about once a day\"     Hina Ndiaye, COT 7:26 AM 07/24/2024                     "

## 2024-09-07 ENCOUNTER — HEALTH MAINTENANCE LETTER (OUTPATIENT)
Age: 54
End: 2024-09-07